# Patient Record
Sex: MALE | Race: WHITE | NOT HISPANIC OR LATINO | Employment: OTHER | URBAN - METROPOLITAN AREA
[De-identification: names, ages, dates, MRNs, and addresses within clinical notes are randomized per-mention and may not be internally consistent; named-entity substitution may affect disease eponyms.]

---

## 2018-01-13 ENCOUNTER — HOSPITAL ENCOUNTER (INPATIENT)
Facility: HOSPITAL | Age: 63
LOS: 12 days | Discharge: HOME HEALTH | DRG: 281 | End: 2018-01-26
Attending: EMERGENCY MEDICINE | Admitting: ANESTHESIOLOGY
Payer: COMMERCIAL

## 2018-01-13 DIAGNOSIS — I48.91 ATRIAL FIBRILLATION (HCC): ICD-10-CM

## 2018-01-13 DIAGNOSIS — I50.41 ACUTE COMBINED SYSTOLIC AND DIASTOLIC HEART FAILURE (HCC): ICD-10-CM

## 2018-01-13 DIAGNOSIS — K62.5 BRBPR (BRIGHT RED BLOOD PER RECTUM): ICD-10-CM

## 2018-01-13 DIAGNOSIS — I15.0 RENOVASCULAR HYPERTENSION: ICD-10-CM

## 2018-01-13 DIAGNOSIS — R73.9 HYPERGLYCEMIA: ICD-10-CM

## 2018-01-13 DIAGNOSIS — E66.01 MORBID OBESITY WITH BMI OF 40.0-44.9, ADULT (HCC): ICD-10-CM

## 2018-01-13 DIAGNOSIS — I48.91 RAPID ATRIAL FIBRILLATION (HCC): Primary | ICD-10-CM

## 2018-01-13 DIAGNOSIS — R06.00 DYSPNEA: ICD-10-CM

## 2018-01-13 DIAGNOSIS — I48.91 ATRIAL FIBRILLATION WITH RVR (HCC): ICD-10-CM

## 2018-01-13 DIAGNOSIS — K64.9 HEMORRHOIDS: ICD-10-CM

## 2018-01-13 LAB
BASOPHILS # BLD AUTO: 0.08 THOUSANDS/ΜL (ref 0–0.1)
BASOPHILS NFR BLD AUTO: 1 % (ref 0–1)
EOSINOPHIL # BLD AUTO: 0.04 THOUSAND/ΜL (ref 0–0.61)
EOSINOPHIL NFR BLD AUTO: 1 % (ref 0–6)
ERYTHROCYTE [DISTWIDTH] IN BLOOD BY AUTOMATED COUNT: 14.9 % (ref 11.6–15.1)
HCT VFR BLD AUTO: 43.8 % (ref 36.5–49.3)
HGB BLD-MCNC: 14.2 G/DL (ref 12–17)
LYMPHOCYTES # BLD AUTO: 1.03 THOUSANDS/ΜL (ref 0.6–4.47)
LYMPHOCYTES NFR BLD AUTO: 14 % (ref 14–44)
MCH RBC QN AUTO: 29.3 PG (ref 26.8–34.3)
MCHC RBC AUTO-ENTMCNC: 32.4 G/DL (ref 31.4–37.4)
MCV RBC AUTO: 90 FL (ref 82–98)
MONOCYTES # BLD AUTO: 0.65 THOUSAND/ΜL (ref 0.17–1.22)
MONOCYTES NFR BLD AUTO: 9 % (ref 4–12)
NEUTROPHILS # BLD AUTO: 5.6 THOUSANDS/ΜL (ref 1.85–7.62)
NEUTS SEG NFR BLD AUTO: 75 % (ref 43–75)
NRBC BLD AUTO-RTO: 0 /100 WBCS
PLATELET # BLD AUTO: 287 THOUSANDS/UL (ref 149–390)
PMV BLD AUTO: 10 FL (ref 8.9–12.7)
RBC # BLD AUTO: 4.85 MILLION/UL (ref 3.88–5.62)
WBC # BLD AUTO: 7.43 THOUSAND/UL (ref 4.31–10.16)

## 2018-01-13 PROCEDURE — 85025 COMPLETE CBC W/AUTO DIFF WBC: CPT | Performed by: EMERGENCY MEDICINE

## 2018-01-13 PROCEDURE — 84484 ASSAY OF TROPONIN QUANT: CPT | Performed by: EMERGENCY MEDICINE

## 2018-01-13 PROCEDURE — 93005 ELECTROCARDIOGRAM TRACING: CPT | Performed by: EMERGENCY MEDICINE

## 2018-01-13 PROCEDURE — 80053 COMPREHEN METABOLIC PANEL: CPT | Performed by: EMERGENCY MEDICINE

## 2018-01-13 PROCEDURE — 93005 ELECTROCARDIOGRAM TRACING: CPT

## 2018-01-13 PROCEDURE — 84443 ASSAY THYROID STIM HORMONE: CPT | Performed by: NURSE PRACTITIONER

## 2018-01-13 PROCEDURE — 36415 COLL VENOUS BLD VENIPUNCTURE: CPT | Performed by: EMERGENCY MEDICINE

## 2018-01-14 ENCOUNTER — APPOINTMENT (INPATIENT)
Dept: NON INVASIVE DIAGNOSTICS | Facility: HOSPITAL | Age: 63
DRG: 281 | End: 2018-01-14
Payer: COMMERCIAL

## 2018-01-14 ENCOUNTER — APPOINTMENT (EMERGENCY)
Dept: RADIOLOGY | Facility: HOSPITAL | Age: 63
DRG: 281 | End: 2018-01-14
Payer: COMMERCIAL

## 2018-01-14 ENCOUNTER — APPOINTMENT (INPATIENT)
Dept: NON INVASIVE DIAGNOSTICS | Facility: HOSPITAL | Age: 63
DRG: 281 | End: 2018-01-14
Attending: INTERNAL MEDICINE
Payer: COMMERCIAL

## 2018-01-14 PROBLEM — I10 HTN (HYPERTENSION): Status: ACTIVE | Noted: 2018-01-14

## 2018-01-14 PROBLEM — R73.9 HYPERGLYCEMIA: Status: ACTIVE | Noted: 2018-01-14

## 2018-01-14 PROBLEM — R06.02 SHORTNESS OF BREATH: Status: ACTIVE | Noted: 2018-01-14

## 2018-01-14 PROBLEM — I48.91 ATRIAL FIBRILLATION WITH RVR (HCC): Status: ACTIVE | Noted: 2018-01-14

## 2018-01-14 PROBLEM — E66.9 DIABETES MELLITUS TYPE 2 IN OBESE (HCC): Status: ACTIVE | Noted: 2018-01-14

## 2018-01-14 PROBLEM — E11.69 DIABETES MELLITUS TYPE 2 IN OBESE (HCC): Status: ACTIVE | Noted: 2018-01-14

## 2018-01-14 LAB
ACETONE SERPL-MCNC: NEGATIVE MG/DL
ALBUMIN SERPL BCP-MCNC: 4.2 G/DL (ref 3.5–5)
ALP SERPL-CCNC: 100 U/L (ref 46–116)
ALT SERPL W P-5'-P-CCNC: 106 U/L (ref 12–78)
ANION GAP SERPL CALCULATED.3IONS-SCNC: 14 MMOL/L (ref 4–13)
ANION GAP SERPL CALCULATED.3IONS-SCNC: 14 MMOL/L (ref 4–13)
APTT PPP: 24 SECONDS (ref 23–35)
APTT PPP: 25 SECONDS (ref 23–35)
ARTERIAL PATENCY WRIST A: ABNORMAL
AST SERPL W P-5'-P-CCNC: 46 U/L (ref 5–45)
ATRIAL RATE: 131 BPM
ATRIAL RATE: 147 BPM
BASE EX.OXY STD BLDV CALC-SCNC: 75.5 % (ref 60–80)
BASE EX.OXY STD BLDV CALC-SCNC: 95.6 % (ref 60–80)
BASE EXCESS BLDA CALC-SCNC: -6 MMOL/L (ref -2–3)
BASE EXCESS BLDV CALC-SCNC: -5.8 MMOL/L
BASE EXCESS BLDV CALC-SCNC: -7 MMOL/L
BILIRUB SERPL-MCNC: 1 MG/DL (ref 0.2–1)
BUN SERPL-MCNC: 18 MG/DL (ref 5–25)
BUN SERPL-MCNC: 23 MG/DL (ref 5–25)
CA-I BLD-SCNC: 1.24 MMOL/L (ref 1.12–1.32)
CALCIUM SERPL-MCNC: 9.3 MG/DL (ref 8.3–10.1)
CALCIUM SERPL-MCNC: 9.3 MG/DL (ref 8.3–10.1)
CHLORIDE SERPL-SCNC: 101 MMOL/L (ref 100–108)
CHLORIDE SERPL-SCNC: 107 MMOL/L (ref 100–108)
CO2 SERPL-SCNC: 22 MMOL/L (ref 21–32)
CO2 SERPL-SCNC: 23 MMOL/L (ref 21–32)
CREAT SERPL-MCNC: 1.04 MG/DL (ref 0.6–1.3)
CREAT SERPL-MCNC: 1.35 MG/DL (ref 0.6–1.3)
ERYTHROCYTE [DISTWIDTH] IN BLOOD BY AUTOMATED COUNT: 15.2 % (ref 11.6–15.1)
EST. AVERAGE GLUCOSE BLD GHB EST-MCNC: 223 MG/DL
FLUAV AG SPEC QL: NORMAL
FLUBV AG SPEC QL: NORMAL
GFR SERPL CREATININE-BSD FRML MDRD: 55 ML/MIN/1.73SQ M
GFR SERPL CREATININE-BSD FRML MDRD: 76 ML/MIN/1.73SQ M
GLUCOSE SERPL-MCNC: 131 MG/DL (ref 65–140)
GLUCOSE SERPL-MCNC: 181 MG/DL (ref 65–140)
GLUCOSE SERPL-MCNC: 216 MG/DL (ref 65–140)
GLUCOSE SERPL-MCNC: 221 MG/DL (ref 65–140)
GLUCOSE SERPL-MCNC: 255 MG/DL (ref 65–140)
GLUCOSE SERPL-MCNC: 261 MG/DL (ref 65–140)
GLUCOSE SERPL-MCNC: 268 MG/DL (ref 65–140)
GLUCOSE SERPL-MCNC: 295 MG/DL (ref 65–140)
GLUCOSE SERPL-MCNC: 424 MG/DL (ref 65–140)
GLUCOSE SERPL-MCNC: 437 MG/DL (ref 65–140)
GLUCOSE SERPL-MCNC: 484 MG/DL (ref 65–140)
GLUCOSE SERPL-MCNC: 93 MG/DL (ref 65–140)
HBA1C MFR BLD: 9.4 % (ref 4.2–6.3)
HCO3 BLDA-SCNC: 16 MMOL/L (ref 24–30)
HCO3 BLDV-SCNC: 15.4 MMOL/L (ref 24–30)
HCO3 BLDV-SCNC: 18.6 MMOL/L (ref 24–30)
HCT VFR BLD AUTO: 38.1 % (ref 36.5–49.3)
HCT VFR BLD CALC: 36 % (ref 36.5–49.3)
HGB BLD-MCNC: 12.4 G/DL (ref 12–17)
HGB BLDA-MCNC: 12.2 G/DL (ref 12–17)
INR PPP: 1.13 (ref 0.86–1.16)
LACTATE SERPL-SCNC: 2.3 MMOL/L (ref 0.5–2)
LACTATE SERPL-SCNC: 2.4 MMOL/L (ref 0.5–2)
MCH RBC QN AUTO: 29.4 PG (ref 26.8–34.3)
MCHC RBC AUTO-ENTMCNC: 32.5 G/DL (ref 31.4–37.4)
MCV RBC AUTO: 90 FL (ref 82–98)
NASAL CANNULA: 2
NT-PROBNP SERPL-MCNC: 4171 PG/ML
O2 CT BLDV-SCNC: 14.3 ML/DL
O2 CT BLDV-SCNC: 18.9 ML/DL
PCO2 BLD: 17 MMOL/L (ref 21–32)
PCO2 BLD: 22.5 MM HG (ref 42–50)
PCO2 BLDV: 23.8 MM HG (ref 42–50)
PCO2 BLDV: 33.3 MM HG (ref 42–50)
PH BLD: 7.46 [PH] (ref 7.3–7.4)
PH BLDV: 7.37 [PH] (ref 7.3–7.4)
PH BLDV: 7.43 [PH] (ref 7.3–7.4)
PLATELET # BLD AUTO: 253 THOUSANDS/UL (ref 149–390)
PLATELET # BLD AUTO: 255 THOUSANDS/UL (ref 149–390)
PMV BLD AUTO: 10.3 FL (ref 8.9–12.7)
PMV BLD AUTO: 9.9 FL (ref 8.9–12.7)
PO2 BLD: 118 MM HG (ref 35–45)
PO2 BLDV: 43.3 MM HG (ref 35–45)
PO2 BLDV: 99.5 MM HG (ref 35–45)
POTASSIUM BLD-SCNC: 4.3 MMOL/L (ref 3.5–5.3)
POTASSIUM SERPL-SCNC: 4 MMOL/L (ref 3.5–5.3)
POTASSIUM SERPL-SCNC: 4.1 MMOL/L (ref 3.5–5.3)
PROT SERPL-MCNC: 7.8 G/DL (ref 6.4–8.2)
PROTHROMBIN TIME: 14.8 SECONDS (ref 12.1–14.4)
QRS AXIS: -45 DEGREES
QRS AXIS: -46 DEGREES
QRSD INTERVAL: 110 MS
QRSD INTERVAL: 120 MS
QT INTERVAL: 280 MS
QT INTERVAL: 320 MS
QTC INTERVAL: 390 MS
QTC INTERVAL: 505 MS
RBC # BLD AUTO: 4.22 MILLION/UL (ref 3.88–5.62)
RSV B RNA SPEC QL NAA+PROBE: NORMAL
SAMPLE SITE: ABNORMAL
SAO2 % BLD FROM PO2: 99 % (ref 95–98)
SODIUM BLD-SCNC: 137 MMOL/L (ref 136–145)
SODIUM SERPL-SCNC: 138 MMOL/L (ref 136–145)
SODIUM SERPL-SCNC: 143 MMOL/L (ref 136–145)
SPECIMEN SOURCE: ABNORMAL
T WAVE AXIS: 56 DEGREES
T WAVE AXIS: 68 DEGREES
TROPONIN I SERPL-MCNC: 0.04 NG/ML
TROPONIN I SERPL-MCNC: 0.04 NG/ML
TROPONIN I SERPL-MCNC: 0.05 NG/ML
TROPONIN I SERPL-MCNC: 0.06 NG/ML
TSH SERPL DL<=0.05 MIU/L-ACNC: 3.66 UIU/ML (ref 0.36–3.74)
VENTRICULAR RATE: 117 BPM
VENTRICULAR RATE: 150 BPM
WBC # BLD AUTO: 8.58 THOUSAND/UL (ref 4.31–10.16)

## 2018-01-14 PROCEDURE — 99285 EMERGENCY DEPT VISIT HI MDM: CPT

## 2018-01-14 PROCEDURE — 96361 HYDRATE IV INFUSION ADD-ON: CPT

## 2018-01-14 PROCEDURE — 85014 HEMATOCRIT: CPT

## 2018-01-14 PROCEDURE — 94660 CPAP INITIATION&MGMT: CPT

## 2018-01-14 PROCEDURE — 36415 COLL VENOUS BLD VENIPUNCTURE: CPT | Performed by: NURSE PRACTITIONER

## 2018-01-14 PROCEDURE — 93306 TTE W/DOPPLER COMPLETE: CPT

## 2018-01-14 PROCEDURE — 80048 BASIC METABOLIC PNL TOTAL CA: CPT | Performed by: NURSE PRACTITIONER

## 2018-01-14 PROCEDURE — 85049 AUTOMATED PLATELET COUNT: CPT | Performed by: NURSE PRACTITIONER

## 2018-01-14 PROCEDURE — 84484 ASSAY OF TROPONIN QUANT: CPT | Performed by: NURSE PRACTITIONER

## 2018-01-14 PROCEDURE — 85730 THROMBOPLASTIN TIME PARTIAL: CPT | Performed by: NURSE PRACTITIONER

## 2018-01-14 PROCEDURE — 85027 COMPLETE CBC AUTOMATED: CPT | Performed by: NURSE PRACTITIONER

## 2018-01-14 PROCEDURE — 82947 ASSAY GLUCOSE BLOOD QUANT: CPT

## 2018-01-14 PROCEDURE — 93005 ELECTROCARDIOGRAM TRACING: CPT

## 2018-01-14 PROCEDURE — 71046 X-RAY EXAM CHEST 2 VIEWS: CPT

## 2018-01-14 PROCEDURE — 36600 WITHDRAWAL OF ARTERIAL BLOOD: CPT

## 2018-01-14 PROCEDURE — 82948 REAGENT STRIP/BLOOD GLUCOSE: CPT

## 2018-01-14 PROCEDURE — 96374 THER/PROPH/DIAG INJ IV PUSH: CPT

## 2018-01-14 PROCEDURE — 83605 ASSAY OF LACTIC ACID: CPT | Performed by: NURSE PRACTITIONER

## 2018-01-14 PROCEDURE — 83880 ASSAY OF NATRIURETIC PEPTIDE: CPT | Performed by: NURSE PRACTITIONER

## 2018-01-14 PROCEDURE — 82009 KETONE BODYS QUAL: CPT | Performed by: NURSE PRACTITIONER

## 2018-01-14 PROCEDURE — 83036 HEMOGLOBIN GLYCOSYLATED A1C: CPT | Performed by: NURSE PRACTITIONER

## 2018-01-14 PROCEDURE — 85610 PROTHROMBIN TIME: CPT | Performed by: NURSE PRACTITIONER

## 2018-01-14 PROCEDURE — 94640 AIRWAY INHALATION TREATMENT: CPT

## 2018-01-14 PROCEDURE — 84132 ASSAY OF SERUM POTASSIUM: CPT

## 2018-01-14 PROCEDURE — 82805 BLOOD GASES W/O2 SATURATION: CPT | Performed by: NURSE PRACTITIONER

## 2018-01-14 PROCEDURE — 87798 DETECT AGENT NOS DNA AMP: CPT | Performed by: NURSE PRACTITIONER

## 2018-01-14 PROCEDURE — 84295 ASSAY OF SERUM SODIUM: CPT

## 2018-01-14 PROCEDURE — 82330 ASSAY OF CALCIUM: CPT

## 2018-01-14 PROCEDURE — 94760 N-INVAS EAR/PLS OXIMETRY 1: CPT

## 2018-01-14 PROCEDURE — 82803 BLOOD GASES ANY COMBINATION: CPT

## 2018-01-14 RX ORDER — ACETAMINOPHEN 325 MG/1
650 TABLET ORAL EVERY 6 HOURS PRN
Status: DISCONTINUED | OUTPATIENT
Start: 2018-01-14 | End: 2018-01-26 | Stop reason: HOSPADM

## 2018-01-14 RX ORDER — HEPARIN SODIUM 10000 [USP'U]/100ML
3-20 INJECTION, SOLUTION INTRAVENOUS
Status: DISCONTINUED | OUTPATIENT
Start: 2018-01-14 | End: 2018-01-18

## 2018-01-14 RX ORDER — SIMVASTATIN 40 MG
40 TABLET ORAL
COMMUNITY

## 2018-01-14 RX ORDER — SODIUM CHLORIDE 9 MG/ML
75 INJECTION, SOLUTION INTRAVENOUS CONTINUOUS
Status: DISCONTINUED | OUTPATIENT
Start: 2018-01-14 | End: 2018-01-14

## 2018-01-14 RX ORDER — METOPROLOL TARTRATE 5 MG/5ML
5 INJECTION INTRAVENOUS ONCE
Status: COMPLETED | OUTPATIENT
Start: 2018-01-14 | End: 2018-01-14

## 2018-01-14 RX ORDER — LEVALBUTEROL 1.25 MG/.5ML
1.25 SOLUTION, CONCENTRATE RESPIRATORY (INHALATION) EVERY 6 HOURS PRN
Status: DISCONTINUED | OUTPATIENT
Start: 2018-01-14 | End: 2018-01-20

## 2018-01-14 RX ORDER — FUROSEMIDE 10 MG/ML
40 INJECTION INTRAMUSCULAR; INTRAVENOUS ONCE
Status: COMPLETED | OUTPATIENT
Start: 2018-01-14 | End: 2018-01-14

## 2018-01-14 RX ORDER — HEPARIN SODIUM 1000 [USP'U]/ML
4000 INJECTION, SOLUTION INTRAVENOUS; SUBCUTANEOUS ONCE
Status: COMPLETED | OUTPATIENT
Start: 2018-01-14 | End: 2018-01-14

## 2018-01-14 RX ORDER — HEPARIN SODIUM 5000 [USP'U]/ML
7500 INJECTION, SOLUTION INTRAVENOUS; SUBCUTANEOUS EVERY 8 HOURS SCHEDULED
Status: DISCONTINUED | OUTPATIENT
Start: 2018-01-14 | End: 2018-01-14

## 2018-01-14 RX ORDER — DILTIAZEM HYDROCHLORIDE 5 MG/ML
15 INJECTION INTRAVENOUS ONCE
Status: COMPLETED | OUTPATIENT
Start: 2018-01-14 | End: 2018-01-14

## 2018-01-14 RX ORDER — SODIUM CHLORIDE FOR INHALATION 0.9 %
3 VIAL, NEBULIZER (ML) INHALATION EVERY 6 HOURS PRN
Status: DISCONTINUED | OUTPATIENT
Start: 2018-01-14 | End: 2018-01-20

## 2018-01-14 RX ORDER — CHLORHEXIDINE GLUCONATE 0.12 MG/ML
15 RINSE ORAL EVERY 12 HOURS SCHEDULED
Status: DISCONTINUED | OUTPATIENT
Start: 2018-01-14 | End: 2018-01-26 | Stop reason: HOSPADM

## 2018-01-14 RX ORDER — INSULIN GLARGINE 100 [IU]/ML
66 INJECTION, SOLUTION SUBCUTANEOUS
Status: ON HOLD | COMMUNITY
End: 2018-01-26

## 2018-01-14 RX ORDER — LEVALBUTEROL 1.25 MG/.5ML
1.25 SOLUTION, CONCENTRATE RESPIRATORY (INHALATION)
Status: DISCONTINUED | OUTPATIENT
Start: 2018-01-14 | End: 2018-01-14

## 2018-01-14 RX ORDER — METOPROLOL TARTRATE 5 MG/5ML
5 INJECTION INTRAVENOUS EVERY 6 HOURS
Status: DISCONTINUED | OUTPATIENT
Start: 2018-01-14 | End: 2018-01-14

## 2018-01-14 RX ORDER — METFORMIN HYDROCHLORIDE 1000 MG/1
1000 TABLET, FILM COATED, EXTENDED RELEASE ORAL
COMMUNITY

## 2018-01-14 RX ORDER — AMLODIPINE BESYLATE 5 MG/1
5 TABLET ORAL DAILY
COMMUNITY
End: 2018-01-26 | Stop reason: HOSPADM

## 2018-01-14 RX ORDER — HEPARIN SODIUM 1000 [USP'U]/ML
4000 INJECTION, SOLUTION INTRAVENOUS; SUBCUTANEOUS AS NEEDED
Status: DISCONTINUED | OUTPATIENT
Start: 2018-01-14 | End: 2018-01-18

## 2018-01-14 RX ORDER — HEPARIN SODIUM 1000 [USP'U]/ML
2000 INJECTION, SOLUTION INTRAVENOUS; SUBCUTANEOUS AS NEEDED
Status: DISCONTINUED | OUTPATIENT
Start: 2018-01-14 | End: 2018-01-18

## 2018-01-14 RX ORDER — CARVEDILOL 6.25 MG/1
6.25 TABLET ORAL 2 TIMES DAILY WITH MEALS
Status: DISCONTINUED | OUTPATIENT
Start: 2018-01-14 | End: 2018-01-15

## 2018-01-14 RX ORDER — DILTIAZEM HYDROCHLORIDE 5 MG/ML
INJECTION INTRAVENOUS
Status: DISPENSED
Start: 2018-01-14 | End: 2018-01-14

## 2018-01-14 RX ORDER — LOSARTAN POTASSIUM 100 MG/1
100 TABLET ORAL DAILY
COMMUNITY
End: 2018-01-26 | Stop reason: HOSPADM

## 2018-01-14 RX ADMIN — SODIUM CHLORIDE 1000 ML: 0.9 INJECTION, SOLUTION INTRAVENOUS at 01:15

## 2018-01-14 RX ADMIN — CARVEDILOL 6.25 MG: 6.25 TABLET, FILM COATED ORAL at 17:26

## 2018-01-14 RX ADMIN — HEPARIN SODIUM 4000 UNITS: 1000 INJECTION, SOLUTION INTRAVENOUS; SUBCUTANEOUS at 12:35

## 2018-01-14 RX ADMIN — LEVALBUTEROL HYDROCHLORIDE 1.25 MG: 1.25 SOLUTION, CONCENTRATE RESPIRATORY (INHALATION) at 10:45

## 2018-01-14 RX ADMIN — DILTIAZEM HYDROCHLORIDE 15 MG: 5 INJECTION INTRAVENOUS at 00:09

## 2018-01-14 RX ADMIN — ISODIUM CHLORIDE 3 ML: 0.03 SOLUTION RESPIRATORY (INHALATION) at 20:13

## 2018-01-14 RX ADMIN — HEPARIN SODIUM 7500 UNITS: 5000 INJECTION, SOLUTION INTRAVENOUS; SUBCUTANEOUS at 06:04

## 2018-01-14 RX ADMIN — Medication 10 UNITS/HR: at 03:59

## 2018-01-14 RX ADMIN — AMIODARONE HYDROCHLORIDE 150 MG: 50 INJECTION, SOLUTION INTRAVENOUS at 12:06

## 2018-01-14 RX ADMIN — ISODIUM CHLORIDE 3 ML: 0.03 SOLUTION RESPIRATORY (INHALATION) at 10:45

## 2018-01-14 RX ADMIN — FUROSEMIDE 40 MG: 10 INJECTION, SOLUTION INTRAMUSCULAR; INTRAVENOUS at 12:34

## 2018-01-14 RX ADMIN — Medication 8 UNITS/HR: at 22:29

## 2018-01-14 RX ADMIN — AMIODARONE HYDROCHLORIDE 1 MG/MIN: 50 INJECTION, SOLUTION INTRAVENOUS at 12:22

## 2018-01-14 RX ADMIN — AMIODARONE HYDROCHLORIDE 0.5 MG/MIN: 50 INJECTION, SOLUTION INTRAVENOUS at 18:30

## 2018-01-14 RX ADMIN — HEPARIN SODIUM 4000 UNITS: 1000 INJECTION, SOLUTION INTRAVENOUS; SUBCUTANEOUS at 21:09

## 2018-01-14 RX ADMIN — METOPROLOL TARTRATE 5 MG: 5 INJECTION, SOLUTION INTRAVENOUS at 01:37

## 2018-01-14 RX ADMIN — SODIUM CHLORIDE 1000 ML: 0.9 INJECTION, SOLUTION INTRAVENOUS at 00:15

## 2018-01-14 RX ADMIN — LEVALBUTEROL HYDROCHLORIDE 1.25 MG: 1.25 SOLUTION, CONCENTRATE RESPIRATORY (INHALATION) at 20:13

## 2018-01-14 RX ADMIN — HEPARIN SODIUM AND DEXTROSE 11.1 UNITS/KG/HR: 10000; 5 INJECTION INTRAVENOUS at 12:35

## 2018-01-14 RX ADMIN — METOPROLOL TARTRATE 5 MG: 5 INJECTION, SOLUTION INTRAVENOUS at 06:04

## 2018-01-14 RX ADMIN — METOPROLOL TARTRATE 5 MG: 5 INJECTION, SOLUTION INTRAVENOUS at 03:22

## 2018-01-14 RX ADMIN — SODIUM CHLORIDE 75 ML/HR: 0.9 INJECTION, SOLUTION INTRAVENOUS at 03:23

## 2018-01-14 RX ADMIN — CHLORHEXIDINE GLUCONATE 15 ML: 1.2 RINSE ORAL at 10:03

## 2018-01-14 RX ADMIN — ACETAMINOPHEN 650 MG: 325 TABLET ORAL at 17:26

## 2018-01-14 NOTE — ED PROVIDER NOTES
History  Chief Complaint   Patient presents with    Shortness of Breath     Pt c/o SOB since [address] without chest pain  Pt has dypnea on excertion and rest  Pt recently started Jaurdiance  Denies N/V     HPI     68-year-old man presents with cough and shortness of breath  Not perceiving any palpitations  Found to be tachycardic on arrival here  Does not live with lung disease  Does not note only have any heart disease  Has diabetes that is generally poorly controlled  Also has hypertension hyperlipidemia  No lightheadedness weakness dizziness fevers chills or sweats  No abdominal pain nausea vomiting diarrhea constipation  No injuries  Medical decision making:    Impression:  Rapid AFib leading to affective congestive heart failure  While he is not fluid overloaded, he warrants fluid resuscitation for his hyperglycemia and tachycardia  He may warrant primary heart rate control  Will admit patient to the step-down unit  Prior to Admission Medications   Prescriptions Last Dose Informant Patient Reported? Taking? amLODIPine (NORVASC) 5 mg tablet   Yes Yes   Sig: Take 5 mg by mouth daily   insulin glargine (LANTUS) 100 units/mL subcutaneous injection   Yes Yes   Sig: Inject 66 Units under the skin daily at bedtime   losartan (COZAAR) 100 MG tablet   Yes Yes   Sig: Take 100 mg by mouth daily   metFORMIN (GLUMETZA) 1000 MG (MOD) 24 hr tablet   Yes Yes   Sig: Take 1,000 mg by mouth daily with breakfast   simvastatin (ZOCOR) 40 mg tablet   Yes Yes   Sig: Take 40 mg by mouth daily at bedtime      Facility-Administered Medications: None       Past Medical History:   Diagnosis Date    Diabetes mellitus ([facility] [address] )     Hyperlipidemia     Hypertension        Past Surgical History:   Procedure Laterality Date    CHOLECYSTECTOMY      KNEE SURGERY Left     NECK SURGERY         History reviewed  No pertinent family history  I have reviewed and agree with the history as documented      Social History Substance Use Topics    Smoking status: Never Smoker    Smokeless tobacco: Never Used    Alcohol use 1 8 oz/week     3 Cans of beer per week        Review of Systems   Constitutional: Positive for fatigue  Negative for appetite change, diaphoresis and fever  HENT: Negative for congestion, dental problem, drooling, ear discharge, ear pain, postnasal drip, rhinorrhea, sore throat, trouble swallowing and voice change  Eyes: Negative for photophobia, pain, discharge, redness and visual disturbance  Respiratory: Positive for chest tightness and shortness of breath  Negative for cough, choking, wheezing and stridor  Cardiovascular: Negative for chest pain, palpitations and leg swelling  Gastrointestinal: Negative for abdominal pain, blood in stool, constipation, diarrhea, nausea and vomiting  Endocrine: Negative  Genitourinary: Negative  Musculoskeletal: Negative  Skin: Negative  Allergic/Immunologic: Negative  Neurological: Negative  Hematological: Negative  Psychiatric/Behavioral: Negative  Physical Exam  ED Triage Vitals   Temperature Pulse Respirations Blood Pressure SpO2   01/13/18 2351 01/13/18 2347 01/13/18 2347 01/13/18 2347 01/13/18 2347   97 9 °F (36 6 °C) (!) 162 (!) 28 121/91 95 %      Temp Source Heart Rate Source Patient Position - Orthostatic VS BP Location FiO2 (%)   01/13/18 2351 01/13/18 2347 01/13/18 2347 01/13/18 2347 --   Oral Monitor Lying Right arm       Pain Score       01/13/18 2347       No Pain           Orthostatic Vital Signs  Vitals:    01/14/18 0045 01/14/18 0145 01/14/18 0245 01/14/18 0437   BP: (!) 121/101 110/71 145/95    Pulse: (!) 134 (!) 117 (!) 121 (!) 109   Patient Position - Orthostatic VS: Lying Lying Sitting        Physical Exam   Constitutional: He is oriented to person, place, and time  He appears well-developed and well-nourished  No distress  Chronically ill-appearing male   HENT:   Head: Normocephalic and atraumatic     Nose: Nose normal    Mouth/Throat: Oropharynx is clear and moist    Eyes: Conjunctivae and EOM are normal  Pupils are equal, round, and reactive to light  Neck: Normal range of motion  Neck supple  No thyromegaly present  Cardiovascular: Normal heart sounds and intact distal pulses  Exam reveals no gallop and no friction rub  No murmur heard  Rapid atrial fibrillation   Pulmonary/Chest: Effort normal and breath sounds normal  No stridor  No respiratory distress  He has no wheezes  He has no rales  He exhibits no tenderness  Mild tachypnea   Abdominal: Soft  Bowel sounds are normal  There is no tenderness  There is no guarding  Musculoskeletal: Normal range of motion  He exhibits no deformity  Neurological: He is alert and oriented to person, place, and time  He exhibits normal muscle tone  Skin: Skin is warm and dry  Psychiatric: He has a normal mood and affect  Vitals reviewed        ED Medications  Medications   sodium chloride 0 9 % infusion (75 mL/hr Intravenous New Bag 1/14/18 0323)   chlorhexidine (PERIDEX) 0 12 % oral rinse 15 mL (15 mL Swish & Spit Not Given 1/14/18 0406)   heparin (porcine) subcutaneous injection 7,500 Units (7,500 Units Subcutaneous Given 1/14/18 0604)   insulin regular (HumuLIN R,NovoLIN R) 1 Units/mL in sodium chloride 0 9 % 100 mL infusion (10 Units/hr Intravenous New Bag 1/14/18 0359)   metoprolol (LOPRESSOR) injection 5 mg (5 mg Intravenous Given 1/14/18 0604)   levalbuterol (XOPENEX) inhalation solution 1 25 mg (not administered)   sodium chloride 0 9 % inhalation solution 3 mL (not administered)   sodium chloride 0 9 % bolus 1,000 mL (0 mL Intravenous Stopped 1/14/18 0045)   diltiazem (CARDIZEM) injection 15 mg (15 mg Intravenous Not Given 1/14/18 0630)   sodium chloride 0 9 % bolus 1,000 mL (0 mL Intravenous Stopped 1/14/18 0145)   metoprolol (LOPRESSOR) injection 5 mg (5 mg Intravenous Given 1/14/18 0137)   metoprolol (LOPRESSOR) injection 5 mg (5 mg Intravenous Given 1/14/18 0322)       Diagnostic Studies  Results Reviewed     Procedure Component Value Units Date/Time    Platelet count [[id number]]  (Normal) Collected:  01/14/18 0533    Lab Status:  Final result Specimen:  Blood from Arm, Left Updated:  01/14/18 0546     Platelets 371 Thousands/uL      MPV 10 3 fL     Lactic acid, plasma [[id number]]  (Abnormal) Collected:  01/14/18 0246    Lab Status:  Final result Specimen:  Blood from Arm, Right Updated:  01/14/18 0317     LACTIC ACID 2 4 (HH) mmol/L     Narrative:         Result may be elevated if tourniquet was used during collection  Acetone [[id number]]  (Normal) Collected:  01/14/18 0246    Lab Status:  Final result Specimen:  Blood from Arm, Right Updated:  01/14/18 0303     Acetone, Bld Negative    Blood gas, venous [[id number]]  (Abnormal) Collected:  01/14/18 0246    Lab Status:  Final result Specimen:  Blood from Arm, Right Updated:  01/14/18 0256     pH, Thomas 7 428 (H)     pCO2, Thomas 23 8 (L) mm Hg      pO2, Thomas 99 5 (H) mm Hg      HCO3, Thomas 15 4 (L) mmol/L      Base Excess, Thomas -7 0 mmol/L      O2 Content, Thomas 18 9 ml/dL      O2 HGB, VENOUS 95 6 (H) %     Hemoglobin A1c [[id number]] Collected:  01/14/18 0246    Lab Status: In process Specimen:  Blood from Arm, Right Updated:  01/14/18 0253    TSH, 3rd generation with T4 reflex [[id number]]  (Normal) Collected:  01/13/18 7008    Lab Status:  Final result Specimen:  Blood from Arm, Right Updated:  01/14/18 0237     TSH 3RD GENERATON 3 661 uIU/mL     Narrative:         Patients undergoing fluorescein dye angiography may retain small amounts of fluorescein in the body for 48-72 hours post procedure  Samples containing fluorescein can produce falsely depressed TSH values  If the patient had this procedure,a specimen should be resubmitted post fluorescein clearance      UA w Reflex to Microscopic w Reflex to Culture [[id number]]     Lab Status:  No result Specimen:  Urine from Urine, Clean Catch     Troponin I [[id number]] (Normal) Collected:  01/13/18 2349    Lab Status:  Final result Specimen:  Blood from Arm, Right Updated:  01/14/18 0013     Troponin I 0 04 ng/mL     Narrative:         [employer] Chemistry analyzer 99% cutoff is > 0 04 ng/mL in network labs    o cTnI 99% cutoff is useful only when applied to patients in the clinical setting of myocardial ischemia  o cTnI 99% cutoff should be interpreted in the context of clinical history, ECG findings and possibly cardiac imaging to establish correct diagnosis  o cTnI 99% cutoff may be suggestive but clearly not indicative of a coronary event without the clinical setting of myocardial ischemia  Comprehensive metabolic panel [[id number]]  (Abnormal) Collected:  01/13/18 2349    Lab Status:  Final result Specimen:  Blood from Arm, Right Updated:  01/14/18 0010     Sodium 138 mmol/L      Potassium 4 0 mmol/L      Chloride 101 mmol/L      CO2 23 mmol/L      Anion Gap 14 (H) mmol/L      BUN 18 mg/dL      Creatinine 1 35 (H) mg/dL      Glucose 424 (H) mg/dL      Calcium 9 3 mg/dL      AST 46 (H) U/L       (H) U/L      Alkaline Phosphatase 100 U/L      Total Protein 7 8 g/dL      Albumin 4 2 g/dL      Total Bilirubin 1 00 mg/dL      eGFR 55 ml/min/1 73sq m     Narrative:         National Kidney Disease Education Program recommendations are as follows:  GFR calculation is accurate only with a steady state creatinine  Chronic Kidney disease less than 60 ml/min/1 73 sq  meters  Kidney failure less than 15 ml/min/1 73 sq  meters      CBC and differential [[id number]]  (Normal) Collected:  01/13/18 2349    Lab Status:  Final result Specimen:  Blood from Arm, Right Updated:  01/13/18 2354     WBC 7 43 Thousand/uL      RBC 4 85 Million/uL      Hemoglobin 14 2 g/dL      Hematocrit 43 8 %      MCV 90 fL      MCH 29 3 pg      MCHC 32 4 g/dL      RDW 14 9 %      MPV 10 0 fL      Platelets 167 Thousands/uL      nRBC 0 /100 WBCs      Neutrophils Relative 75 %      Lymphocytes Relative 14 % Monocytes Relative 9 %      Eosinophils Relative 1 %      Basophils Relative 1 %      Neutrophils Absolute 5 60 Thousands/µL      Lymphocytes Absolute 1 03 Thousands/µL      Monocytes Absolute 0 65 Thousand/µL      Eosinophils Absolute 0 04 Thousand/µL      Basophils Absolute 0 08 Thousands/µL                  XR chest pa & lateral    (Results Pending)              Procedures  ECG 12 Lead Documentation  Date/Time: 1/14/2018 12:44 AM  Performed by: KAY Giang  Authorized by: KAY Giang     Interpretation:     Interpretation: abnormal    Rate:     ECG rate assessment: tachycardic    Rhythm:     Rhythm: atrial fibrillation    Ectopy:     Ectopy: none    QRS:     QRS axis:  Normal    QRS intervals:  Normal  Conduction:     Conduction: normal    ST segments:     ST segments:  Normal  T waves:     T waves: normal    CriticalCare Time  Performed byKAY Rudd  Authorized byKAY Rudd     Critical care provider statement:     Critical care was necessary to treat or prevent imminent or life-threatening deterioration of the following conditions:  Cardiac failure    Critical care was time spent personally by me on the following activities:  Blood draw for specimens, obtaining history from patient or surrogate, development of treatment plan with patient or surrogate, discussions with consultants, evaluation of patient's response to treatment, examination of patient, review of old charts, re-evaluation of patient's condition, ordering and review of radiographic studies, ordering and review of laboratory studies and ordering and performing treatments and interventions  Comments:      Cardizem drip for rapid AFib with acute CHF  Multiple conversations with transfer of patient care  Phone Contacts  ED Phone Contact    ED Course  ED Course                                MDM  Number of Diagnoses or Management Options  Dyspnea:   Hyperglycemia:   Rapid atrial fibrillation ([address] ):      The patient presented with a condition in which there was a high probability of imminent or life-threatening deterioration, and critical care services (excluding separately billable procedures) totalled 30-74 minutes  Disposition  Final diagnoses:   Rapid atrial fibrillation ([address] )   Dyspnea   Hyperglycemia     Time reflects when diagnosis was documented in both MDM as applicable and the Disposition within this note     Time User Action Codes Description Comment    1/14/2018  1:32 AM Mendy, Case Add [[id number]] Rapid atrial fibrillation ([address] )     1/14/2018  1:32 AM Mendy, Case Add [[id number]] Dyspnea     1/14/2018  1:32 AM Mendy, Case Add [[id number]] Hyperglycemia       ED Disposition     ED Disposition Condition Comment    Admit  Case was discussed with critical care and the patient's admission status was agreed to be Admission Status: inpatient status to the service of Dr Sam Hogan  Follow-up Information    None       Current Discharge Medication List      CONTINUE these medications which have NOT CHANGED    Details   amLODIPine (NORVASC) 5 mg tablet Take 5 mg by mouth daily      insulin glargine (LANTUS) 100 units/mL subcutaneous injection Inject 66 Units under the skin daily at bedtime      losartan (COZAAR) 100 MG tablet Take 100 mg by mouth daily      metFORMIN (GLUMETZA) 1000 MG (MOD) 24 hr tablet Take 1,000 mg by mouth daily with breakfast      simvastatin (ZOCOR) 40 mg tablet Take 40 mg by mouth daily at bedtime           No discharge procedures on file      ED Provider  Electronically Signed by           [facility] Charla  01/14/18 0985

## 2018-01-14 NOTE — CONSULTS
Consultation - Cardiology Team Damaso  [facility] 60 y o  male MRN: [id number]  Unit/Bed#:  Encounter: [id number]    Inpatient consult to Cardiology  Consult performed by: Mya Parkinson  Consult ordered by: Jia Kelly          Physician Requesting Consult: Wendy Dockery DO  Reason for Consult / Principal Problem: afib    HPI: Cardiologist Dr Indira Bolden is a 60y o  year old male who has a history of diabetes, hypertension  Patient cam to the emergency room with complaints of nocturnal dyspnea and a 2 day history of rapid palpitations  He states he was recently started on Jardiance by his cardiologist who also acts as his primary care physician  He denies having any heart problems  He states he started having chest congestion 2 days after starting Jardiance  He also notes he ran out of his insulin medication    Currently he states he is feeling okay states his breathing has improved, denies any feeling of palpitations    REVIEW OF SYSTEMS:  Constitutional:  Denies fever or chills   Eyes:  Denies change in visual acuity   HENT:  Denies nasal congestion or sore throat   Respiratory: + shortness of breath Denies cough  Cardiovascular: + palpitations Denies chest pain or edema   GI:  Denies abdominal pain, nausea, vomiting, bloody stools or diarrhea   :  Denies dysuria, frequency, difficulty in micturition and nocturia  Musculoskeletal:  Denies back pain or joint pain   Neurologic:  Denies headache, focal weakness or sensory changes   Endocrine:  Denies polyuria or polydipsia   Lymphatic:  Denies swollen glands   Psychiatric:  Denies depression or anxiety     Historical Information   Past Medical History:   Diagnosis Date    Diabetes mellitus ([facility] [address] )     Hyperlipidemia     Hypertension      Past Surgical History:   Procedure Laterality Date    CHOLECYSTECTOMY      KNEE SURGERY Left     NECK SURGERY       History   Alcohol Use    1 8 oz/week    3 Cans of beer per week     History   Drug Use No     History   Smoking Status    Never Smoker   Smokeless Tobacco    Never Used       Family History: History reviewed  No pertinent family history  MEDS & ALLERGIES:  all current active meds have been reviewed and current meds: Current Facility-Administered Medications   Medication Dose Route Frequency    amiodarone (CORDARONE) 900 mg in dextrose 5 % 500 mL infusion  1 mg/min Intravenous Continuous    Followed by   [place] Cousin amiodarone (CORDARONE) 900 mg in dextrose 5 % 500 mL infusion  0 5 mg/min Intravenous Continuous    amiodarone 150 mg in dextrose 5 % 100 mL IV bolus  150 mg Intravenous Once    chlorhexidine (PERIDEX) 0 12 % oral rinse 15 mL  15 mL Swish & Spit Q12H [address]    furosemide (LASIX) injection 40 mg  40 mg Intravenous Once    heparin (porcine) 25,000 units in 250 mL infusion (premix)  3-20 Units/kg/hr (Order-Specific) Intravenous Titrated    heparin (porcine) injection 2,000 Units  2,000 Units Intravenous PRN    heparin (porcine) injection 4,000 Units  4,000 Units Intravenous Once    heparin (porcine) injection 4,000 Units  4,000 Units Intravenous PRN    insulin regular (HumuLIN R,NovoLIN R) 1 Units/mL in sodium chloride 0 9 % 100 mL infusion  0 3-21 Units/hr Intravenous Titrated    levalbuterol (XOPENEX) inhalation solution 1 25 mg  1 25 mg Nebulization Q6H PRN    sodium chloride 0 9 % infusion  75 mL/hr Intravenous Continuous    sodium chloride 0 9 % inhalation solution 3 mL  3 mL Nebulization Q6H PRN       amiodarone 1 mg/min    Followed by     amiodarone 0 5 mg/min    heparin (porcine) 3-20 Units/kg/hr (Order-Specific)    insulin regular (HumuLIN R,NovoLIN R) infusion 0 3-21 Units/hr Last Rate: Stopped (01/14/18 0900)   sodium chloride 75 mL/hr Last Rate: 75 mL/hr (01/14/18 0323)     No Known Allergies    OBJECTIVE:  Vitals:   Vitals:    01/14/18 1100   BP: 94/70   Pulse: (!) 135   Resp: (!) 36   Temp: (!) 96 7 °F (35 9 °C)   SpO2: 97%     Body mass index is 43 11 kg/m²      Systolic (24hrs), Av , Min:94 , UPF:700     Diastolic (85VSE), NDO:84, Min:70, Max:101      Intake/Output Summary (Last 24 hours) at 18 1122  Last data filed at 18 0400   Gross per 24 hour   Intake          2046 25 ml   Output              400 ml   Net          1646 25 ml     Weight (last 2 days)     Date/Time   Weight    18 2348  (!)  142 (313 49)            Invasive Devices     Peripheral Intravenous Line            Peripheral IV 18 Right Antecubital less than 1 day                PHYSICAL EXAMS:  General:  Patient is not in acute distress, laying in the bed comfortably, awake, alert responding to commands  Head: Normocephalic, Atraumatic  HEENT: White sclera, pink conjunctiva,  PERRLA,pharynx benign  Neck:  Supple,+ JVD carotids+2/+2 no bruits, thyromegaly, adenopathy  Respiratory:  Decreased breath sounds bilaterally  Cardiovascular:  PMI normal, S1-S2 normal, No  Murmurs, thrills, gallops, rubs  Irregularly irregular tachycardic  GI:  Abdomen soft nontender   No hepatosplenomegaly, adenopathy, ascites,or rebound tenderness  Extremities: No edema, normal pulses, no calf tenderness, no joint deformities, no venous disease   Integument:  No skin rashes or ulceration  Lymphatic:  No cervical or inguinal lymphadenopathy  Neurologic:  Patient is awake alert, responding to command, well-oriented to time and place and person moving all extremities    LABORATORY RESULTS:    Results from last 7 days  Lab Units 18  0925 18  0616 18  2349   TROPONIN I ng/mL 0 05* 0 06* 0 04     CBC with diff:   Results from last 7 days  Lab Units 18  0533 18  0420 18  2349   WBC Thousand/uL  --   --  7 43   HEMOGLOBIN g/dL  --   --  14 2   I STAT HEMOGLOBIN g/dl  --  12 2  --    HEMATOCRIT %  --   --  43 8   MCV fL  --   --  90   PLATELETS Thousands/uL 253  --  287   MCH pg  --   --  29 3   MCHC g/dL  --   --  32 4   RDW %  --   --  14 9   MPV fL 10 3  --  10 0   NRBC AUTO /100 WBCs --   --  0       CMP:  Results from last 7 days  Lab Units 01/14/18  0420 01/13/18  2349   SODIUM mmol/L  --  138   POTASSIUM mmol/L  --  4 0   CHLORIDE mmol/L  --  101   CO2 mmol/L  --  23   ANION GAP mmol/L  --  14*   BUN mg/dL  --  18   CREATININE mg/dL  --  1 35*   GLUCOSE RANDOM mg/dL  --  424*   GLUCOSE, ISTAT mg/dl 484*  --    CALCIUM mg/dL  --  9 3   AST U/L  --  46*   ALT U/L  --  106*   ALK PHOS U/L  --  100   TOTAL PROTEIN g/dL  --  7 8   ALBUMIN g/dL  --  4 2   BILIRUBIN TOTAL mg/dL  --  1 00   EGFR ml/min/1 73sq m  --  55       BMP:  Results from last 7 days  Lab Units 01/14/18  0420 01/13/18  2349   SODIUM mmol/L  --  138   POTASSIUM mmol/L  --  4 0   CHLORIDE mmol/L  --  101   CO2 mmol/L  --  23   BUN mg/dL  --  18   CREATININE mg/dL  --  1 35*   GLUCOSE RANDOM mg/dL  --  424*   GLUCOSE, ISTAT mg/dl 484*  --    CALCIUM mg/dL  --  9 3                      Results from last 7 days  Lab Units 01/13/18  2349   TSH 3RD GENERATON uIU/mL 3 661           Lipid Profile:   No results found for: CHOL  No results found for: HDL  No results found for: LDLCALC  No results found for: TRIG    Cardiac testing:   No results found for this or any previous visit  No results found for this or any previous visit  No procedure found  No results found for this or any previous visit  Imaging:   I have personally reviewed pertinent reports  EKG reviewed personally:  AFib with RVR, left anterior fascicular block, septal infarct age undetermined    Telemetry reviewed personally:   Rapid AFib    Assessment/Plan:  1  Atrial fibrillation with RVR--amiodarone drip to be initiated  Continue with heparin drip  Echocardiogram ordered and pending  Patient may need beta-blockers if amiodarone does not bring heart rate down  Will need to discuss anticoagulation when heart rate is improved  TSH 3 66    2  NSTEMI type 2 likely secondary to AFib with RVR & CHF; troponin 0 04/0 06/0 05  Echocardiogram pending  Continue all medications  Monitor telemetry  3   Acute congestive heart failure--systolic versus diastolic to be determined--continue with diuretics cautiously given low blood pressure  Monitor intake and output  Daily weights  Salt restriction  Monitor labs closely  4   Hypertension--on the lower side, blood pressure 94/70  Code Status: Level 1 - Full Code    Counseling / Coordination of Care  Total floor / unit time spent today 35 minutes  Greater than 50% of total time was spent with the patient and / or family counseling and / or coordination of care  A description of the counseling / coordination of care: Review of history, current assessment, development of a plan      Mayra Frederick PA-C  1/14/2018,11:22 AM

## 2018-01-14 NOTE — H&P
History and Physical - Critical Care   Pamela Ayala 60 y o  male MRN: [id number]  Unit/Bed#: ED 24 Encounter: [id number]    Reason for Admission / Chief Complaint:  Rapid heartbeat, nocturnal dyspnea    History of Present Illness:  Pamela Ayala is a 60 y o  male with past medical history significant for diabetes and hypertension who presents with a five-day history of nocturnal dyspnea and a 2 day history of rapid heartbeat  He states that he was started recently on Jardience by his cardiologist who is acting as his primary care physician  Patient states he does not have any cardiac problems  Patient states he stopped his Jardience 2 days ago when he developed chest congestion that he related to the onset of the medication  Patient also states he has not been taking his insulin as ordered due to running out of the medication  He is unsure of the last time he took his insulin  Patient denies chest pain, fever, abdominal pain  Denies productive cough  History obtained from the patient  Past Medical History:  Past Medical History:   Diagnosis Date    Diabetes mellitus ([address] )     Hyperlipidemia     Hypertension        Past Surgical History:  Past Surgical History:   Procedure Laterality Date    CHOLECYSTECTOMY      KNEE SURGERY Left     NECK SURGERY         Past Family History:  History reviewed  No pertinent family history      Social History:  History   Smoking Status    Never Smoker   Smokeless Tobacco    Never Used     History   Alcohol Use No     History   Drug Use No     Marital Status: /Civil Union      Medications:  Current Facility-Administered Medications   Medication Dose Route Frequency    chlorhexidine (PERIDEX) 0 12 % oral rinse 15 mL  15 mL Swish & Spit Q12H [address]    diltiazem (CARDIZEM) 125 mg/25 mL injection **AcuDose Override Pull**        heparin (porcine) subcutaneous injection 7,500 Units  7,500 Units Subcutaneous Q8H [address]    sodium chloride 0 9 % bolus 1,000 mL  1,000 mL Intravenous Once    sodium chloride 0 9 % infusion  75 mL/hr Intravenous Continuous     Home medications:  Prior to Admission medications    Not on File     Allergies:  No Known Allergies    ROS:   Review of Systems   Constitutional: Positive for fatigue  HENT: Positive for congestion  Eyes: Negative  Respiratory: Negative  Cardiovascular: Negative  Negative for chest pain, palpitations and leg swelling  Irregular rapid heart beat in past   Gastrointestinal: Negative  Endocrine: Positive for polyuria  Genitourinary: Negative  Musculoskeletal: Negative  Skin: Negative  Allergic/Immunologic: Negative  Neurological: Negative  Hematological: Negative  Psychiatric/Behavioral: Negative  Vitals:  Blood pressure 145/95, pulse (!) 121, temperature 97 7 °F (36 5 °C), temperature source Oral, resp  rate (!) 26, height 5' 11 5" (1 816 m), weight (!) 142 kg (313 lb 7 9 oz), SpO2 97 %  Temperature:   Temp (24hrs), Av 9 °F (36 6 °C), Min:97 9 °F (36 6 °C), Max:97 9 °F (36 6 °C)    Current Temperature: 97 9 °F (36 6 °C)    Weights:   IBW: 76 45 kg  Body mass index is 43 11 kg/m²  Hemodynamic Monitoring:     Non-Invasive/Invasive Ventilation Settings:  Respiratory    Lab Data (Last 4 hours)    None         O2/Vent Data (Last 4 hours)    None              No results found for: PHART, NAD4XQH, PO2ART, WFU4ITW, I4TCQMVP, BEART, SOURCE  SpO2: SpO2: 97 %     Physical Exam:   Physical Exam   Constitutional: He is oriented to person, place, and time  He appears well-developed and well-nourished  No distress  HENT:   Head: Normocephalic and atraumatic  Nose: Nose normal    Mouth/Throat: Oropharynx is clear and moist    Eyes: Conjunctivae and EOM are normal  Pupils are equal, round, and reactive to light  No scleral icterus  Neck: Normal range of motion  Neck supple  No JVD present  No tracheal deviation present  Cardiovascular: Intact distal pulses    An irregular rhythm present  Tachycardia present  Exam reveals distant heart sounds  Exam reveals no gallop and no friction rub  No murmur heard  Pulmonary/Chest: Effort normal  No accessory muscle usage  Tachypnea noted  No respiratory distress  He has decreased breath sounds in the right middle field, the right lower field, the left middle field and the left lower field  Able to complete short sentences without difficulty  Abdominal: Soft  Bowel sounds are normal  He exhibits no distension  There is no tenderness  There is no rebound and no guarding  Musculoskeletal: Normal range of motion  He exhibits no edema, tenderness or deformity  Neurological: He is alert and oriented to person, place, and time  He has normal strength  No cranial nerve deficit or sensory deficit  GCS eye subscore is 4  GCS verbal subscore is 5  GCS motor subscore is 6  Skin: Skin is warm and dry  No rash noted  No pallor  Psychiatric: He has a normal mood and affect   His behavior is normal        Labs:  Lab Results   Component Value Date    WBC 7 43 01/13/2018    HGB 14 2 01/13/2018    HCT 43 8 01/13/2018    MCV 90 01/13/2018     01/13/2018     Lab Results   Component Value Date    GLUCOSE 424 (H) 01/13/2018    CALCIUM 9 3 01/13/2018     01/13/2018    K 4 0 01/13/2018    CO2 23 01/13/2018     01/13/2018    BUN 18 01/13/2018    CREATININE 1 35 (H) 01/13/2018     Lab Results   Component Value Date    CALCIUM 9 3 01/13/2018     Lab Results   Component Value Date     01/13/2018    K 4 0 01/13/2018     01/13/2018    CO2 23 01/13/2018    ANIONGAP 14 (H) 01/13/2018    BUN 18 01/13/2018    CREATININE 1 35 (H) 01/13/2018    GLUCOSE 424 (H) 01/13/2018    CALCIUM 9 3 01/13/2018    AST 46 (H) 01/13/2018     (H) 01/13/2018    ALKPHOS 100 01/13/2018    PROT 7 8 01/13/2018    ALBUMIN 4 2 01/13/2018    BILITOT 1 00 01/13/2018    EGFR 55 01/13/2018     No results found for: INR, PROTIME  Lab Results   Component Value Date  (H) 01/13/2018    AST 46 (H) 01/13/2018    ALKPHOS 100 01/13/2018    BILITOT 1 00 01/13/2018     No results found for: TSH, Z6EPFMV, V6AQMPA, THYROIDAB  No results found for: HGBA1C      Imaging:  XR chest pa & lateral    (Results Pending)    Prominent pulmonary vasculature  Mild cardiomegaly  Independently reviewed by me  I have personally reviewed pertinent films in PACS  EKG:  Rapid atrial fib  Rate 130 on the cardiac monitor  This was personally reviewed by myself  Micro:  No results found for: Tosha Yi Seraron    ______________________________________________________________________    Assessment:   Patient Active Problem List   Diagnosis    HTN (hypertension)    Diabetes mellitus type 2 in obese [facility] )    Atrial fibrillation with RVR ([facility] )    Shortness of breath         Plan:      Neuro:  No acute issues identified  Sleep hygiene  CV:  Rapid atrial fibrillation  Lopressor for rate control  Continue to monitor blood pressure  Pulm:  HS BiPAP for suspected obesity hypoventilation  Patient complains that he has not been able to sleep, being awakened by dyspnea  Recommend pulmonary consult with sleep study as an outpatient  Check flu swab  GI:  No acute issues identified  :  UA for polyuria  Due to recent Jardience, at increased risk for infection  F/E/N:  Normal saline for fluid maintenance  NPO present  ID:  No leukocytosis or fever  Continue to monitor closely  Follow up on urinalysis  Heme:  Hemoglobin and platelets stable  Endo: Will use insulin drip to gain control of glucoses  Patient states he is only on metformin at home at this time  Check lactic acid  Check hemoglobin A1c and acetone level  Insulin per protocol  Msk/Skin:  No acute issues identified  Disposition:  Admit to step-down      Counseling / Coordination of Care  Total Critical Care time spent 35 minutes excluding procedures, teaching and family updates  ______________________________________________________________________    VTE Pharmacologic Prophylaxis: Heparin  VTE Mechanical Prophylaxis: sequential compression device    Invasive lines and devices: Invasive Devices     Peripheral Intravenous Line            Peripheral IV 01/13/18 Right Antecubital less than 1 day                Code Status: Level 1 - Full Code  POA:    POLST:      Given critical illness, patient length of stay will require greater than two midnights  Portions of the record may have been created with voice recognition software  Occasional wrong word or "sound a like" substitutions may have occurred due to the inherent limitations of voice recognition software  Read the chart carefully and recognize, using context, where substitutions have occurred        DANNY Carrion

## 2018-01-14 NOTE — ED NOTES
As per [place] ICU NP hold Cardizem drip and give Lopressor        Radhames Louis, RN  01/14/18 6088

## 2018-01-14 NOTE — CASE MANAGEMENT
Initial Clinical Review    Admission: Date/Time/Statement: 1/14/18 @ 0132     Orders Placed This Encounter   Procedures    Inpatient Admission (expected length of stay for this patient is greater than two midnights)     Standing Status:   Standing     Number of Occurrences:   1     Order Specific Question:   Admitting Physician     Answer:   Chel Mancera [[id number]]     Order Specific Question:   Level of Care     Answer:   Level 1 Stepdown [13]     Order Specific Question:   Estimated length of stay     Answer:   More than 2 Midnights     Order Specific Question:   Certification     Answer:   I certify that inpatient services are medically necessary for this patient for a duration of greater than two midnights  See H&P and MD Progress Notes for additional information about the patient's course of treatment  ED: Date/Time/Mode of Arrival:   ED Arrival Information     Expected Arrival Acuity Means of Arrival Escorted By Service Admission Type    - 1/13/2018 23:26 Emergent Walk-In Family Member Critical Care/ICU Emergency    Arrival Complaint    difficulty breathing          Chief Complaint:   Chief Complaint   Patient presents with    Shortness of Breath     Pt c/o SOB since [address] without chest pain  Pt has dypnea on excertion and rest  Pt recently started Jaurdiance  Denies N/V       History of Dennis Surjit is a 60 y o  male with past medical history significant for diabetes and hypertension who presents with a five-day history of nocturnal dyspnea and a 2 day history of rapid heartbeat  He states that he was started recently on Jardience by his cardiologist who is acting as his primary care physician  Patient states he does not have any cardiac problems  Patient states he stopped his Jardience 2 days ago when he developed chest congestion that he related to the onset of the medication  Patient also states he has not been taking his insulin as ordered due to running out of the medication    He is unsure of the last time he took his insulin  Patient denies chest pain, fever, abdominal pain    Denies productive cough        ED Vital Signs:   ED Triage Vitals   Temperature Pulse Respirations Blood Pressure SpO2   01/13/18 2351 01/13/18 2347 01/13/18 2347 01/13/18 2347 01/13/18 2347   97 9 °F (36 6 °C) (!) 162 (!) 28 121/91 95 %      Temp Source Heart Rate Source Patient Position - Orthostatic VS BP Location FiO2 (%)   01/13/18 2351 01/13/18 2347 01/13/18 2347 01/13/18 2347 --   Oral Monitor Lying Right arm       Pain Score       01/13/18 2347       No Pain        Wt Readings from Last 1 Encounters:   01/13/18 (!) 142 kg (313 lb 7 9 oz)       Vital Signs (abnormal):   01/14/18 1100   96 7 °F (35 9 °C)   135   36  94/70  78  97 %  Nasal cannula  Lying   01/14/18 1049  --  --  --  --  --  100 %  Nasal cannula  --   01/14/18 1047  --  --  --  --               01/14/18 0437  --   109   29  --  --  100 %  --  --   01/14/18 0330  97 7 °F (36 5 °C)  --  --  --  --  --  --  --   01/14/18 0245  --   121   26  145/95  --  97 %  None (Room air)  Sitting   01/14/18 0145  --   117   51  110/71  --  94 %  None (Room air)  Lying   01/14/18 0045  --   134   26   121/101  --  97 %  None (Room        Abnormal Labs/Diagnostic Test Results:an gap  14, BUN creat 14  1 35, gluc 424, ast 46, alt  106    ED Treatment:   Medication Administration from 01/13/2018 2326 to 01/14/2018 9817       Date/Time Order Dose Route Action Action by Comments     01/14/2018 0045 sodium chloride 0 9 % bolus 1,000 mL 0 mL Intravenous Stopped Michelle Hanks RN      01/14/2018 0015 sodium chloride 0 9 % bolus 1,000 mL 1,000 mL Intravenous New Bag Michelle Hanks RN      01/14/2018 0009 diltiazem (CARDIZEM) injection 15 mg 15 mg Intravenous Given Syble Hanks, RN      01/14/2018 0145 sodium chloride 0 9 % bolus 1,000 mL 0 mL Intravenous Stopped Michelle Hanks RN      01/14/2018 0115 sodium chloride 0 9 % bolus 1,000 mL 1,000 mL Intravenous New Bag Carly Pace RN      01/14/2018 0212 metoprolol (LOPRESSOR) injection 5 mg 5 mg Intravenous Given Carly Pace RN           Past Medical/Surgical History: Active Ambulatory Problems     Diagnosis Date Noted    No Active Ambulatory Problems     Resolved Ambulatory Problems     Diagnosis Date Noted    No Resolved Ambulatory Problems     Past Medical History:   Diagnosis Date    Diabetes mellitus [facility] )     Hyperlipidemia     Hypertension        Admitting Diagnosis: Dyspnea [R06 00]  SOB (shortness of breath) [R06 02]  Hyperglycemia [R73 9]  Rapid atrial fibrillation (HCC) [I48 91]    Age/Sex: 60 y o  male    Assessment/Plan: Assessment:       Patient Active Problem List   Diagnosis    HTN (hypertension)    Diabetes mellitus type 2 in obese ([facility] )    Atrial fibrillation with RVR (HCC)    Shortness of breath       Plan:                  Neuro:  No acute issues identified  Sleep hygiene                  CV:  Rapid atrial fibrillation  Lopressor for rate control  Continue to monitor blood pressure                  Pulm:  HS BiPAP for suspected obesity hypoventilation  Patient complains that he has not been able to sleep, being awakened by dyspnea  Recommend pulmonary consult with sleep study as an outpatient  Check flu swab                  GI:  No acute issues identified                  :  UA for polyuria  Due to recent Jardience, at increased risk for infection                  F/E/N:  Normal saline for fluid maintenance  NPO present                  ID:  No leukocytosis or fever  Continue to monitor closely  Follow up on urinalysis                 Heme:  Hemoglobin and platelets stable                  Endo: Will use insulin drip to gain control of glucoses  Patient states he is only on metformin at home at this time  Check lactic acid  Check hemoglobin A1c and acetone level    Insulin per protocol                  Msk/Skin:  No acute issues identified     Disposition:  Admit to step-down      Counseling / Coordination of Care  Total Critical Care time spent 35 minutes excluding procedures, teaching and family updates        VTE Pharmacologic Prophylaxis: Heparin  VTE Mechanical Prophylaxis: sequential compression device      Code Status: Level 1 - Full Code  POA:    POLST:  Given critical illness, patient length of stay will require greater than two midnights  Admission Orders:  Scheduled Meds:   amiodarone (CORDARONE) IV bolus 150 mg Intravenous Once   chlorhexidine 15 mL Swish & Spit Q12H [address]   furosemide 40 mg Intravenous Once   heparin (porcine) 4,000 Units Intravenous Once     Continuous Infusions:   amiodarone 1 mg/min    Followed by     amiodarone 0 5 mg/min    heparin (porcine) 3-20 Units/kg/hr (Order-Specific)    insulin regular (HumuLIN R,NovoLIN R) infusion 0 3-21 Units/hr Last Rate: Stopped (01/14/18 0900)     PRN Meds: heparin (porcine)    heparin (porcine)    levalbuterol    sodium chloride     Fall precautions  Cons carb diet   Enc deep breathing and coughing   Fingerstick bs q2hr   Daily weight   I&O   Neuro checks q2hr   Echo   ptt q6hr  Bmp, cbc   ,mg, vbg , bnp , coags 1/14  OT PT eval   Cardiology consult   SCD  ua cx     Thank you,  [facility] in the [facility] by [facility] Rouse for 2017  Network Utilization Review Department  Phone: [phone]; Fax [phone]  ATTENTION: The Network Utilization Review Department is now centralized for our 7 Facilities  Make a note that we have a new phone and fax numbers for our Department  Please call with any questions or concerns to [phone] and carefully follow the prompts so that you are directed to the right person  All voicemails are confidential  Fax any determinations, approvals, denials, and requests for initial or continue stay review clinical to [phone]   Due to HIGH CALL volume, it would be easier if you could please send faxed requests to expedite your requests and in part, help us provide discharge notifications faster

## 2018-01-15 LAB
ANION GAP SERPL CALCULATED.3IONS-SCNC: 14 MMOL/L (ref 4–13)
APTT PPP: 39 SECONDS (ref 23–35)
APTT PPP: 45 SECONDS (ref 23–35)
APTT PPP: 67 SECONDS (ref 23–35)
APTT PPP: 69 SECONDS (ref 23–35)
BASOPHILS # BLD AUTO: 0.1 THOUSANDS/ΜL (ref 0–0.1)
BASOPHILS NFR BLD AUTO: 1 % (ref 0–1)
BUN SERPL-MCNC: 30 MG/DL (ref 5–25)
CALCIUM SERPL-MCNC: 9.4 MG/DL (ref 8.3–10.1)
CHLORIDE SERPL-SCNC: 106 MMOL/L (ref 100–108)
CO2 SERPL-SCNC: 19 MMOL/L (ref 21–32)
CREAT SERPL-MCNC: 1.24 MG/DL (ref 0.6–1.3)
EOSINOPHIL # BLD AUTO: 0.03 THOUSAND/ΜL (ref 0–0.61)
EOSINOPHIL NFR BLD AUTO: 0 % (ref 0–6)
ERYTHROCYTE [DISTWIDTH] IN BLOOD BY AUTOMATED COUNT: 15.4 % (ref 11.6–15.1)
GFR SERPL CREATININE-BSD FRML MDRD: 61 ML/MIN/1.73SQ M
GLUCOSE SERPL-MCNC: 101 MG/DL (ref 65–140)
GLUCOSE SERPL-MCNC: 104 MG/DL (ref 65–140)
GLUCOSE SERPL-MCNC: 120 MG/DL (ref 65–140)
GLUCOSE SERPL-MCNC: 138 MG/DL (ref 65–140)
GLUCOSE SERPL-MCNC: 139 MG/DL (ref 65–140)
GLUCOSE SERPL-MCNC: 144 MG/DL (ref 65–140)
GLUCOSE SERPL-MCNC: 152 MG/DL (ref 65–140)
GLUCOSE SERPL-MCNC: 158 MG/DL (ref 65–140)
GLUCOSE SERPL-MCNC: 196 MG/DL (ref 65–140)
GLUCOSE SERPL-MCNC: 232 MG/DL (ref 65–140)
GLUCOSE SERPL-MCNC: 237 MG/DL (ref 65–140)
HCT VFR BLD AUTO: 42.9 % (ref 36.5–49.3)
HGB BLD-MCNC: 13.6 G/DL (ref 12–17)
LYMPHOCYTES # BLD AUTO: 1.03 THOUSANDS/ΜL (ref 0.6–4.47)
LYMPHOCYTES NFR BLD AUTO: 11 % (ref 14–44)
MAGNESIUM SERPL-MCNC: 2 MG/DL (ref 1.6–2.6)
MCH RBC QN AUTO: 29.2 PG (ref 26.8–34.3)
MCHC RBC AUTO-ENTMCNC: 31.7 G/DL (ref 31.4–37.4)
MCV RBC AUTO: 92 FL (ref 82–98)
MONOCYTES # BLD AUTO: 0.86 THOUSAND/ΜL (ref 0.17–1.22)
MONOCYTES NFR BLD AUTO: 9 % (ref 4–12)
NEUTROPHILS # BLD AUTO: 7.24 THOUSANDS/ΜL (ref 1.85–7.62)
NEUTS SEG NFR BLD AUTO: 78 % (ref 43–75)
NRBC BLD AUTO-RTO: 0 /100 WBCS
PHOSPHATE SERPL-MCNC: 4 MG/DL (ref 2.3–4.1)
PLATELET # BLD AUTO: 284 THOUSANDS/UL (ref 149–390)
PMV BLD AUTO: 10 FL (ref 8.9–12.7)
POTASSIUM SERPL-SCNC: 3.9 MMOL/L (ref 3.5–5.3)
RBC # BLD AUTO: 4.65 MILLION/UL (ref 3.88–5.62)
SODIUM SERPL-SCNC: 139 MMOL/L (ref 136–145)
WBC # BLD AUTO: 9.3 THOUSAND/UL (ref 4.31–10.16)

## 2018-01-15 PROCEDURE — 82948 REAGENT STRIP/BLOOD GLUCOSE: CPT

## 2018-01-15 PROCEDURE — 85730 THROMBOPLASTIN TIME PARTIAL: CPT | Performed by: NURSE PRACTITIONER

## 2018-01-15 PROCEDURE — 83735 ASSAY OF MAGNESIUM: CPT | Performed by: NURSE PRACTITIONER

## 2018-01-15 PROCEDURE — 94760 N-INVAS EAR/PLS OXIMETRY 1: CPT

## 2018-01-15 PROCEDURE — 94660 CPAP INITIATION&MGMT: CPT

## 2018-01-15 PROCEDURE — 80048 BASIC METABOLIC PNL TOTAL CA: CPT | Performed by: NURSE PRACTITIONER

## 2018-01-15 PROCEDURE — 84100 ASSAY OF PHOSPHORUS: CPT | Performed by: NURSE PRACTITIONER

## 2018-01-15 PROCEDURE — 85025 COMPLETE CBC W/AUTO DIFF WBC: CPT | Performed by: NURSE PRACTITIONER

## 2018-01-15 RX ORDER — FUROSEMIDE 10 MG/ML
40 INJECTION INTRAMUSCULAR; INTRAVENOUS ONCE
Status: COMPLETED | OUTPATIENT
Start: 2018-01-15 | End: 2018-01-15

## 2018-01-15 RX ORDER — CARVEDILOL 12.5 MG/1
12.5 TABLET ORAL 2 TIMES DAILY WITH MEALS
Status: DISCONTINUED | OUTPATIENT
Start: 2018-01-15 | End: 2018-01-16

## 2018-01-15 RX ADMIN — CARVEDILOL 6.25 MG: 6.25 TABLET, FILM COATED ORAL at 07:49

## 2018-01-15 RX ADMIN — INSULIN LISPRO 2 UNITS: 100 INJECTION, SOLUTION INTRAVENOUS; SUBCUTANEOUS at 23:37

## 2018-01-15 RX ADMIN — FUROSEMIDE 40 MG: 10 INJECTION, SOLUTION INTRAVENOUS at 07:49

## 2018-01-15 RX ADMIN — HEPARIN SODIUM 2000 UNITS: 1000 INJECTION, SOLUTION INTRAVENOUS; SUBCUTANEOUS at 04:01

## 2018-01-15 RX ADMIN — HEPARIN SODIUM AND DEXTROSE 17.1 UNITS/KG/HR: 10000; 5 INJECTION INTRAVENOUS at 05:35

## 2018-01-15 RX ADMIN — CHLORHEXIDINE GLUCONATE 15 ML: 1.2 RINSE ORAL at 08:00

## 2018-01-15 RX ADMIN — CHLORHEXIDINE GLUCONATE 15 ML: 1.2 RINSE ORAL at 20:34

## 2018-01-15 RX ADMIN — HEPARIN SODIUM 4000 UNITS: 1000 INJECTION, SOLUTION INTRAVENOUS; SUBCUTANEOUS at 10:41

## 2018-01-15 RX ADMIN — FUROSEMIDE 40 MG: 10 INJECTION, SOLUTION INTRAMUSCULAR; INTRAVENOUS at 20:34

## 2018-01-15 RX ADMIN — HEPARIN SODIUM AND DEXTROSE 21.1 UNITS/KG/HR: 10000; 5 INJECTION INTRAVENOUS at 19:09

## 2018-01-15 RX ADMIN — CARVEDILOL 12.5 MG: 12.5 TABLET, FILM COATED ORAL at 16:53

## 2018-01-15 RX ADMIN — INSULIN LISPRO 2 UNITS: 100 INJECTION, SOLUTION INTRAVENOUS; SUBCUTANEOUS at 17:54

## 2018-01-15 NOTE — PROGRESS NOTES
Progress Note - Critical Care   Karlene Vicente 60 y o  male MRN: [id number]  Unit/Bed#:  Encounter: [id number]    Attending Physician: Fina Strickland, DO      ______________________________________________________________________  Assessment and Plan:   Principal Problem:    Atrial fibrillation with RVR ([facility] )  Active Problems:    HTN (hypertension)    Diabetes mellitus type 2 in obese ([place] [address] )    Shortness of breath    Hyperglycemia  Resolved Problems:    * No resolved hospital problems  *        Neuro:  No acute issues identified  CV:  Continue heparin and amiodarone for rapid atrial fibrillation and type 2 non STEMI, possibly due to tachyarrhythmia  Patient with acute systolic heart failure with an undetermined etiology  Continue Coreg  Continue to monitor  Pulm:  Patient with recent history of nocturnal dyspnea, accepted BiPAP overnight last night with improvement of work of breathing  Encourage incentive spirometry and mobilization out of bed during the day to prevent atelectasis  Mild diuresis yesterday, may wish to repeat today  GI:  No active issues identified  :  No active issues identified  F/E/N:  Replete electrolytes as needed  Tolerated p o  diet  ID:  No active issues identified  Continue to monitor  Heme:  Hemoglobin and platelets stable  Continue to monitor  Endo: Insulin per algorithm 2  Continue to monitor  Msk/Skin:  No issues identified  PT/OT and early mobilization  Disposition:  Continue step-down level of care  Code Status: Level 1 - Full Code    Counseling / Coordination of Care  Total time spent today 35 minutes  Greater than 50% of total time was spent with the patient and / or family counseling and / or coordination of care    ______________________________________________________________________    HPI/24 Hour Events:   Admitted for hyperglycemia and rapid AFib  Attempt to control Lopressor, with minimal improvement    Started on amiodarone and heparin yesterday  Patient found to have systolic heart failure with significant nocturnal dyspnea, but resistant to nocturnal BiPAP       ______________________________________________________________________    Physical Exam:   GEN:  No acute distress, anxious at times  HEENT:  Sclera anicteric, mucous membranes pink and moist, conjunctiva pink, no abhijit/rhinorrhea  Neck:  No lymphadenopathy, normal ROM, supple, no bruits  CV :  S1S2, no murmurs, rubs or gallops  Intact distal pulses  No JVD  Irregularly irregular  Resp:  Lungs CTA =B/L  No subq air or crepitus  Symmetrical expansion  No cough noted  GI :  Abd soft, nontender, no guarding/rebound, nondistended, normoactive bowel sounds X4 quads, no organomegaly appreciated  Neuro:  CN II-XII grossly intact, nonfocal exam, speech clear, GCS 15  Psych:  Appropriate affect      ______________________________________________________________________  Blood pressure 108/82, pulse (!) 113, temperature (!) 97 2 °F (36 2 °C), temperature source Oral, resp  rate (!) 25, height 5' 11 5" (1 816 m), weight (!) 142 kg (313 lb 7 9 oz), SpO2 100 %  Temperature:   Temp (24hrs), Av 3 °F (36 3 °C), Min:96 7 °F (35 9 °C), Max:97 9 °F (36 6 °C)    Current Temperature: (!) 97 2 °F (36 2 °C)  Weights:   IBW: 76 45 kg    Body mass index is 43 11 kg/m²  Weight (last 2 days)     Date/Time   Weight    18 2348  (!)  142 (313 49)              Non-Invasive/Invasive Ventilation Settings:  Respiratory    Lab Data (Last 4 hours)    None         O2/Vent Data (Last 4 hours)      01/15 0406          Non-Invasive Ventilation Mode BiPAP                 No results found for: PHART, PBC5IRX, PO2ART, DGL4RNW, Z2DBRKOU, BEART, SOURCE  SpO2: SpO2: 100 %  Intake and Outputs:  I/O        07 -  0700  07 - 01/15 0700    P  O   300    I V  (mL/kg) 46 3 (0 3) 1078 2 (7 6)    IV Piggyback  100    Total Intake(mL/kg) 2046 3 (14 4) 1478 2 (10 4)    Urine (mL/kg/hr) 400 1450 (0 4)    Total Output 400 1450    Net +1646 3 +28 2                Nutrition:        Diet Orders            Start     Ordered    01/14/18 1050  Diet Dinesh/CHO Controlled; Consistent Carbohydrate Diet Level 3 (6 carb servings/90 grams CHO/meal); Cardiac Step 1  Diet effective now     Question Answer Comment   Diet Type Dinesh/CHO Controlled    Dinesh/CHO Controlled Consistent Carbohydrate Diet Level 3 (6 carb servings/90 grams CHO/meal)    Other Restriction(s): Cardiac Step 1    RD to adjust diet per protocol?  No        01/14/18 1049          Labs:   Lab Results   Component Value Date    WBC 9 30 01/15/2018    HGB 13 6 01/15/2018    HCT 42 9 01/15/2018    MCV 92 01/15/2018     01/15/2018     Lab Results   Component Value Date    GLUCOSE 139 01/15/2018    CALCIUM 9 4 01/15/2018     01/15/2018    K 3 9 01/15/2018    CO2 19 (L) 01/15/2018     01/15/2018    BUN 30 (H) 01/15/2018    CREATININE 1 24 01/15/2018     Lab Results   Component Value Date    CALCIUM 9 4 01/15/2018    PHOS 4 0 01/15/2018     Lab Results   Component Value Date     01/15/2018    K 3 9 01/15/2018     01/15/2018    CO2 19 (L) 01/15/2018    ANIONGAP 14 (H) 01/15/2018    BUN 30 (H) 01/15/2018    CREATININE 1 24 01/15/2018    GLUCOSE 139 01/15/2018    CALCIUM 9 4 01/15/2018    AST 46 (H) 01/13/2018     (H) 01/13/2018    ALKPHOS 100 01/13/2018    PROT 7 8 01/13/2018    ALBUMIN 4 2 01/13/2018    BILITOT 1 00 01/13/2018    EGFR 61 01/15/2018     Lab Results   Component Value Date    INR 1 13 01/14/2018    PROTIME 14 8 (H) 01/14/2018     Lab Results   Component Value Date     (H) 01/13/2018    AST 46 (H) 01/13/2018    ALKPHOS 100 01/13/2018    BILITOT 1 00 01/13/2018     No results found for: TSH, X8KKQVQ, Q8WMZAI, THYROIDAB  Lab Results   Component Value Date    HGBA1C 9 4 (H) 01/14/2018         Imaging:  None today  EKG:  AFib on cardiac monitor  Micro:  No results found for: Michelle Jones, SPUTUMCULTUR  Allergies: No Known Allergies  Medications:   Scheduled Meds:  carvedilol 6 25 mg Oral BID With Meals   chlorhexidine 15 mL Swish & Spit Q12H [address]     Continuous Infusions:  heparin (porcine) 3-20 Units/kg/hr (Order-Specific) Last Rate: 17 1 Units/kg/hr (01/15/18 0403)   insulin regular (HumuLIN R,NovoLIN R) infusion 0 3-21 Units/hr Last Rate: 1 5 Units/hr (01/15/18 0212)     PRN Meds:    acetaminophen 650 mg Q6H PRN   heparin (porcine) 2,000 Units PRN   heparin (porcine) 4,000 Units PRN   levalbuterol 1 25 mg Q6H PRN   sodium chloride 3 mL Q6H PRN     VTE Pharmacologic Prophylaxis: Heparin Drip  VTE Mechanical Prophylaxis: sequential compression device  Invasive lines and devices: Invasive Devices     Peripheral Intravenous Line            Peripheral IV 01/13/18 Right Antecubital 1 day    Peripheral IV 01/14/18 Left Forearm less than 1 day    Peripheral IV 01/14/18 Right Arm less than 1 day                     Portions of the record may have been created with voice recognition software  Occasional wrong word or "sound a like" substitutions may have occurred due to the inherent limitations of voice recognition software  Read the chart carefully and recognize, using context, where substitutions have occurred      Ulysses Clan, CRNP

## 2018-01-15 NOTE — PLAN OF CARE
DISCHARGE PLANNING     Discharge to home or other facility with appropriate resources Progressing        INFECTION - ADULT     Absence or prevention of progression during hospitalization Progressing        PAIN - ADULT     Verbalizes/displays adequate comfort level or baseline comfort level Progressing        Potential for Falls     Patient will remain free of falls Progressing        Prexisting or High Potential for Compromised Skin Integrity     Skin integrity is maintained or improved Progressing        RESPIRATORY - ADULT     Achieves optimal ventilation and oxygenation Progressing        SAFETY ADULT     Maintain or return to baseline ADL function Progressing     Maintain or return mobility status to optimal level Progressing

## 2018-01-15 NOTE — CASE MANAGEMENT
[address] [place] [address] in the [facility] by Thomas Rouse for 2017  Network Utilization Review Department  Phone: [phone]; Fax [phone]  ATTENTION: The Network Utilization Review Department is now centralized for our 7 Facilities  Please call with any questions or concerns to [phone] and carefully follow the prompts so that you are directed to the right person  All voicemails are confidential  Fax any determinations, approvals, denials, and requests for initial or continue stay review clinical to [phone]  Due to HIGH CALL volume, it would be easier if you could please send faxed requests to expedite your requests and in part, help us provide discharge notifications faster   /////////////////////////////////////////////////////////////////////////////////////////////////////////////////    Initial Clinical Review     Admission: Date/Time/Statement: 1/14/18 @ 0132            Orders Placed This Encounter   Procedures    Inpatient Admission (expected length of stay for this patient is greater than two midnights)       Standing Status:   Standing       Number of Occurrences:   1       Order Specific Question:   Admitting Physician       Answer:   Josué Beatty [[id number]]       Order Specific Question:   Level of Care       Answer:   Level 1 Stepdown [13]       Order Specific Question:   Estimated length of stay       Answer:   More than 2 Midnights       Order Specific Question:   Certification       Answer:   I certify that inpatient services are medically necessary for this patient for a duration of greater than two midnights   See H&P and MD Progress Notes for additional information about the patient's course of treatment             ED: Date/Time/Mode of Arrival:             ED Arrival Information      Expected Arrival Acuity Means of Arrival Escorted By Service Admission Type     - 1/13/2018 23:26 Emergent Walk-In Family Member Critical Care/ICU Emergency     Arrival Complaint     difficulty breathing             Chief Complaint:        Chief Complaint   Patient presents with    Shortness of Breath       Pt c/o SOB since Tueday without chest pain  Pt has dypnea on excertion and rest  Pt recently started Jaurdiance  Denies N/V         History of Illness:Mateus Clarke is a 60 y  o  male with past medical history significant for diabetes and hypertension and HLD,  presents with a five-day history of nocturnal dyspnea and a 2 day history of rapid heartbeat   He states that he was started recently on Jardience by his cardiologist who is acting as his primary care physician  Isela Person states he does not have any cardiac problems   Patient states he stopped his Jardience 2 days ago when he developed chest congestion that he related to the onset of the medication   Patient also states he has not been taking his insulin as ordered due to running out of the medication       ED Vital Signs:            ED Triage Vitals   Temperature Pulse Respirations Blood Pressure SpO2   01/13/18 2351 01/13/18 2347 01/13/18 2347 01/13/18 2347 01/13/18 2347   97 9 °F (36 6 °C) (!) 162 (!) 28 121/91 95 %       Temp Source Heart Rate Source Patient Position - Orthostatic VS BP Location FiO2 (%)   01/13/18 2351 01/13/18 2347 01/13/18 2347 01/13/18 2347 --   Oral Monitor Lying Right arm         Pain Score           01/13/18 2347           No Pain                Wt Readings from Last 1 Encounters:   01/13/18 (!) 142 kg (313 lb 7 9 oz)         Vital Signs (abnormal):   01/14/18 1100    96 7 °F (35 9 °C)    135    36   94/70   78   97 %   Nasal cannula   Lying   01/14/18 1049   --   --   --   --   --   100 %   Nasal cannula   --   01/14/18 1047   --   --   --   --                         01/14/18 0437   --    109    29   --   --   100 %   --   --   01/14/18 0330   97 7 °F (36 5 °C)   --   --   --   --   --   --   --   01/14/18 0245   --    121    26   145/95   --   97 %   None (Room air)   Sitting   01/14/18 0145   --    117    51   110/71   --   94 %   None (Room air)   Lying   01/14/18 0045   --    134    26    121/101   --   97 %   None (Room           Abnormal Labs/Diagnostic Test Results:an gap  14, BUN creat 14  1 35, gluc 424, ast 46, alt  106     ED Treatment:              Medication Administration from 01/13/2018 2326 to 01/14/2018 3546        Date/Time Order Dose Route Action Action by Comments                   01/14/2018 0015 sodium chloride 0 9 % bolus 1,000 mL 1,000 mL Intravenous New Bag Gloria Burrell, YASMEEN         01/14/2018 0009 diltiazem (CARDIZEM) injection 15 mg 15 mg Intravenous Given Gloria Burrell RN                     01/14/2018 0115 sodium chloride 0 9 % bolus 1,000 mL 1,000 mL Intravenous New Bag Gloria Burrell, YASMEEN         01/14/2018 0137 metoprolol (LOPRESSOR) injection 5 mg 5 mg Intravenous Given Gloria Burrell, YASMEEN              Admitting Diagnosis: Dyspnea [R06 00]  SOB (shortness of breath) [R06 02]  Hyperglycemia [R73 9]  Rapid atrial fibrillation (HCC) [I48 91]      Assessment:         Patient Active Problem List   Diagnosis    HTN (hypertension)    Diabetes mellitus type 2 in obese ([facility] [address] [facility]    Atrial fibrillation with RVR ([facility] [address] )    Shortness of breath       Plan:                  Neuro:  No acute issues identified   Sleep hygiene                  RQ:  Rapid atrial fibrillation   Lopressor for rate control   Continue to monitor blood pressure                  Pulm:  HS BiPAP for suspected obesity hypoventilation   Patient complains that he has not been able to sleep, being awakened by dyspnea   Recommend pulmonary consult with sleep study as an outpatient   Check flu swab                   GI:  No acute issues identified                  : Mason Glass for polyuria   Due to recent Jardience, at increased risk for infection                  F/E/N:  Normal saline for fluid maintenance   NPO present                  ID:  No leukocytosis or fever   Continue to monitor closely   Follow up on urinalysis                 Heme:  Hemoglobin and platelets stable                  Endo:  Will use insulin drip to gain control of glucoses   Patient states he is only on metformin at home at this time   Check lactic acid   Check hemoglobin A1c and acetone level   Insulin per protocol                  Msk/Skin:  No acute issues identified                  Disposition:  Admit to step-down      Counseling / Coordination of Care  Total Critical Care time spent 35 minutes excluding procedures, teaching and family updates        VTE Pharmacologic Prophylaxis: Heparin  VTE Mechanical Prophylaxis: sequential compression device     1/14  Admission Orders:  Admit telemetry  Continuous insulin gtt, heparin gtt,  Amiodarone gtt     Scheduled Meds:   amiodarone (CORDARONE) IV bolus 150 mg Intravenous Once   chlorhexidine 15 mL Swish & Spit Q12H [facility]   furosemide 40 mg Intravenous Once   heparin (porcine) 4,000 Units Intravenous Once      Continuous Infusions:   amiodarone 1 mg/min     Followed by       amiodarone 0 5 mg/min     heparin (porcine) 3-20 Units/kg/hr (Order-Specific)     insulin regular (HumuLIN R,NovoLIN R) infusion 0 3-21 Units/hr Last Rate: Stopped (01/14/18 0900)      Fall precautions  Cons carb diet   Enc deep breathing and coughing   Fingerstick bs q2hr   Daily weight   I&O   Neuro checks q2hr   Echo   ptt q6hr  Bmp, cbc   ,mg, vbg , bnp , coags 1/14  OT PT eval   Cardiology consult   SCD  ua cx     1/14/2018   Cardiology:   AFib with RVR, left anterior fascicular block, septal infarct age undetermined   Assessment/Plan:  1  Atrial fibrillation with RVR--amiodarone drip to be initiated  Continue with heparin drip  Echocardiogram ordered and pending  Patient may need beta-blockers if amiodarone does not bring heart rate down  Will need to discuss anticoagulation when heart rate is improved  TSH 3 66     2  NSTEMI type 2 likely secondary to AFib with RVR & CHF; troponin 0 04/0 06/0 05    Echocardiogram pending  Continue all medications  Monitor telemetry       3  Acute congestive heart failure--systolic versus diastolic to be determined--continue with diuretics cautiously given low blood pressure  Monitor intake and output  Daily weights  Salt restriction  Monitor labs closely      4  Hypertension--on the lower side, blood pressure 94/70         1/14   Meds:  Insulin gtt;  Heparin gtt; amiodarone gtt ;   IVF NS @ 75  (dc'ed)   xopenex nebs prn (x2)  cardizem 15 mg IV @ 0009  Amiodarone 150 mg  IV  @ 1206  Lopressor 5 mg IV  @ 0137 and 0322  And 0604  Lasix 40 IV x1      Denisha Brett 1/15/2018  HPI/24 Hour Events:   Admitted for hyperglycemia and rapid AFib  Attempt to control Lopressor, with minimal improvement  Started on amiodarone gtt  and heparin gtt  yesterday  Patient found to have systolic heart failure with significant nocturnal dyspnea, but resistant to nocturnal BiPAP  Vs:  119   27   114/85   100% on RA    Continuous insulin gtt ;  Heparin gtt; Lasix 40  IV x1 this am;  Amiodarone GTT     CV:  Continue heparin and amiodarone for rapid atrial fibrillation and type 2 non STEMI, possibly due to tachyarrhythmia  Patient with acute systolic heart failure with an undetermined etiology  Continue Coreg  Continue to monitor    Pulm:  Patient with recent history of nocturnal dyspnea, accepted BiPAP overnight last night with improvement of work of breathing  Encourage incentive spirometry and mobilization out of bed during the day to prevent atelectasis  Mild diuresis yesterday, may wish to repeat today    F/E/N:  Replete electrolytes as needed  Tolerated p o  Diet  Endo: Insulin per algorithm 2  Continue to monitor  Msk/Skin:  No issues identified  PT/OT and early mobilization    Disposition:  Continue step-down level of care        1/15 CARDIOLODY   Acute systolic CHF, AFib with rapid ventricular response  Systolic dysfunction is probably tachy mediated due to AFib but ischemia cannot be ruled out  Continue beta-blocker for rate control  Amiodarone if patient is hypotensive  Continue heparin drip  He may need cardiac catheterization before discharge to rule out ischemia once the heart rate is not controlled and dyspnea improves  Continue IV Lasix for now monitor and correct electrolytes  Monitor serum creatinine  Plan:  1  Atrial fibrillation with RVR:  Patient on amiodarone drip and Coreg 6 25 mg p o  b i d  Continue with heparin drip  Echocardiogram pending  Will discuss anticoagulation when heart rate is improved  Heart rate in the 110s      2  NSTEMI type 2 likely secondary to atrial fibrillation with RVR and Congestive heart failure:  Troponins minimally elevated  Echocardiogram pending  Continue all medications  Monitor telemetry      3  Acute congestive heart failure:  Systolic versus diastolic to be determined  Echo pending  Continue diuresis cautiously as patient is hypotensive  Low-salt diet, monitor daily weights, I/O  Last recorded serum creatinine 1 24      4  Hypertension:  Patient currently has soft BP  Last recorded BP 99/56  Continue to monitor      @ 1707  Amiodarone  And Insulin gtt's dc'ed      Dara Soulier 1/16/2018  Continues heparin gtt  On bipap thru noc     Report    01/15 0701  01/16 0700 01/16 0701  01/16 1216  Most Recent    Temperature (°F) 97  598 1 97 9  97 9 (36 6)    Pulse 106122 108  108    Respirations 2047 23  23    Blood Pressure 82/66126/58 129/78  129/78    SpO2 (%) 95100 96  96            Discharge Plan: tbd

## 2018-01-15 NOTE — SOCIAL WORK
Cm met with pt and daughter at bedside  Pt recently moved here from [place] and is living with his daughter Irish Aguirre in a 2 story house with no steps to navigate  His wife lives in [facility]  Normally, he has no problem navigating steps and is independent with ADL's  He uses no DME's  He has used OP/PT years ago in [place] following knee issues  His PCP and cardiologist are located in [facility]   CM supplied [employer] card to assist in finding a local [address]  Denies substance abuse issues  Denies hx of anxiety or depression  He does not have a POA or Advanced Directive  CM supplied info on both  He uses [facility] in [place] and his daughter tells me that he does have difficulty paying the co-pays  CM asked Chey Ceja from financial to eval for any assistance  He is retired, but still drives  Daughter Irish Aguirre will transport home when medically cleared  CM discussed discharge needs  Pt is hoping that he will not need services when discharged, but does give CM permission to place referal to [facility] agency  He does not have a preference  CM will place referral and keep pt informed  CM will continue to follow

## 2018-01-15 NOTE — PLAN OF CARE
Problem: DISCHARGE PLANNING - CARE MANAGEMENT  Goal: Discharge to post-acute care or home with appropriate resources  INTERVENTIONS:  - Conduct assessment to determine patient/family and health care team treatment goals, and need for post-acute services based on payer coverage, community resources, and patient preferences, and barriers to discharge  - Address psychosocial, clinical, and financial barriers to discharge as identified in assessment in conjunction with the patient/family and health care team  - Arrange appropriate level of post-acute services according to patients   needs and preference and payer coverage in collaboration with the physician and health care team  - Communicate with and update the patient/family, physician, and health care team regarding progress on the discharge plan  - Arrange appropriate transportation to post-acute venues  Outcome: Progressing  Cm met with pt and daughter at bedside  Pt recently moved here from [place] and is living with his daughter Nevaeh Jara in a 2 story house with no steps to navigate  His wife lives in [facility]  Normally, he has no problem navigating steps and is independent with ADL's  He uses no DME's  He has used OP/PT years ago in [place] following knee issues  His PCP and cardiologist are located in [facility]   CM supplied [employer] card to assist in finding a local [address]  Denies substance abuse issues  Denies hx of anxiety or depression  He does not have a POA or Advanced Directive  CM supplied info on both  He uses [facility] in [facility] and his daughter tells me that he does have difficulty paying the co-pays  CM asked Susanna Mattson from financial to eval for any assistance  He is retired, but still drives  Daughter Nevaeh Jara will transport home when medically cleared  CM discussed discharge needs  Pt is hoping that he will not need services when discharged, but does give CM permission to place referal to [facility] agency  He does not have a preference    CM will place referral and keep pt informed  CM will continue to follow

## 2018-01-15 NOTE — PROGRESS NOTES
General Cardiology   Progress Note   Taina Brownlee 60 y o  male MRN: [id number]  Unit/Bed#:  Encounter: [id number]        Subjective:    No significant events since the last encounter  Patient is short of breath but otherwise has no complaints  Objective:   Vitals:  Vitals:    01/15/18 0800   BP: 91/66   Pulse: (!) 122   Resp: (!) 29   Temp:    SpO2: 100%       Body mass index is 43 11 kg/m²  Systolic (08PBX), TUB:978 , Min:89 , ZEM:131     Diastolic (04EPE), FEF:37, Min:46, Max:96      Intake/Output Summary (Last 24 hours) at 01/15/18 1016  Last data filed at 01/15/18 0937   Gross per 24 hour   Intake          1565 48 ml   Output             2100 ml   Net          -534 52 ml     Weight (last 2 days)     Date/Time   Weight    01/13/18 2348  (!)  142 (313 49)              Telemetry Review:  AFib with RVR, HR 110s    PHYSICAL EXAMS:  General:  Patient is not in acute distress, laying in the bed comfortably, awake, alert responding to commands  Head: Normocephalic, Atraumatic  HEENT: White sclera, pink conjunctiva,  PERRLA,pharynx benign  Neck:  Supple, no neck vein distention, carotids+2/+2 no bruits, thyromegaly, adenopathy  Respiratory: clear to P/A  Cardiovascular:  + tachycardia,+ irregular irregular, PMI normal, S1-S2 normal, No  Murmurs, thrills, gallops, rubs   GI:  Abdomen soft nontender   No hepatosplenomegaly, adenopathy, ascites,or rebound tenderness  Extremities: No edema, normal pulses, no calf tenderness, no joint deformities, no venous disease   Integument:  No skin rashes or ulceration  Lymphatic:  No cervical or inguinal lymphadenopathy  Neurologic:  Patient is awake alert, responding to command, well-oriented to time and place and person moving all extremities      LABORATORY RESULTS:    Results from last 7 days  Lab Units 01/14/18  2318 01/14/18  1953 01/14/18  1540   TROPONIN I ng/mL 0 04 0 05* 0 05*     CBC with diff:   Results from last 7 days  Lab Units 01/15/18  0305 01/14/18  1220 01/14/18  0533 01/14/18  0420 01/13/18  2349   WBC Thousand/uL 9 30 8 58  --   --  7 43   HEMOGLOBIN g/dL 13 6 12 4  --   --  14 2   I STAT HEMOGLOBIN g/dl  --   --   --  12 2  --    HEMATOCRIT % 42 9 38 1  --   --  43 8   MCV fL 92 90  --   --  90   PLATELETS Thousands/uL 284 255 253  --  287   MCH pg 29 2 29 4  --   --  29 3   MCHC g/dL 31 7 32 5  --   --  32 4   RDW % 15 4* 15 2*  --   --  14 9   MPV fL 10 0 9 9 10 3  --  10 0   NRBC AUTO /100 WBCs 0  --   --   --  0       CMP:  Results from last 7 days  Lab Units 01/15/18  0305 01/14/18  1218 01/14/18  0420 01/13/18  2349   SODIUM mmol/L 139 143  --  138   POTASSIUM mmol/L 3 9 4 1  --  4 0   CHLORIDE mmol/L 106 107  --  101   CO2 mmol/L 19* 22  --  23   ANION GAP mmol/L 14* 14*  --  14*   BUN mg/dL 30* 23  --  18   CREATININE mg/dL 1 24 1 04  --  1 35*   GLUCOSE RANDOM mg/dL 139 181*  --  424*   GLUCOSE, ISTAT mg/dl  --   --  484*  --    CALCIUM mg/dL 9 4 9 3  --  9 3   AST U/L  --   --   --  46*   ALT U/L  --   --   --  106*   ALK PHOS U/L  --   --   --  100   TOTAL PROTEIN g/dL  --   --   --  7 8   ALBUMIN g/dL  --   --   --  4 2   BILIRUBIN TOTAL mg/dL  --   --   --  1 00   EGFR ml/min/1 73sq m 61 76  --  55       BMP:  Results from last 7 days  Lab Units 01/15/18  0305 01/14/18  1218  01/13/18  2349   SODIUM mmol/L 139 143  --  138   POTASSIUM mmol/L 3 9 4 1  --  4 0   CHLORIDE mmol/L 106 107  --  101   CO2 mmol/L 19* 22  --  23   BUN mg/dL 30* 23  --  18   CREATININE mg/dL 1 24 1 04  --  1 35*   GLUCOSE RANDOM mg/dL 139 181*  --  424*   GLUCOSE, ISTAT   --   --   < >  --    CALCIUM mg/dL 9 4 9 3  --  9 3   < > = values in this interval not displayed          Results from last 7 days  Lab Units 01/14/18  1218   NT-PRO BNP pg/mL 4,171*        Results from last 7 days  Lab Units 01/15/18  0305   MAGNESIUM mg/dL 2 0       Results from last 7 days  Lab Units 01/14/18  0246   HEMOGLOBIN A1C % 9 4*       Results from last 7 days  Lab Units 01/13/18  2349   TSH 3RD GENERATON uIU/mL 3 661       Results from last 7 days  Lab Units 01/14/18  1219   INR  1 13       Lipid Profile:   No results found for: CHOL  No results found for: HDL  No results found for: LDLCALC  No results found for: TRIG    Cardiac testing:   No results found for this or any previous visit  No results found for this or any previous visit  No results found for this or any previous visit  No procedure found  No results found for this or any previous visit  Meds/Allergies   all current active meds have been reviewed and current meds:   Current Facility-Administered Medications   Medication Dose Route Frequency    acetaminophen (TYLENOL) tablet 650 mg  650 mg Oral Q6H PRN    carvedilol (COREG) tablet 6 25 mg  6 25 mg Oral BID With Meals    chlorhexidine (PERIDEX) 0 12 % oral rinse 15 mL  15 mL Swish & Spit Q12H [address]    heparin (porcine) 25,000 units in 250 mL infusion (premix)  3-20 Units/kg/hr (Order-Specific) Intravenous Titrated    heparin (porcine) injection 2,000 Units  2,000 Units Intravenous PRN    heparin (porcine) injection 4,000 Units  4,000 Units Intravenous PRN    insulin regular (HumuLIN R,NovoLIN R) 1 Units/mL in sodium chloride 0 9 % 100 mL infusion  0 3-21 Units/hr Intravenous Titrated    levalbuterol (XOPENEX) inhalation solution 1 25 mg  1 25 mg Nebulization Q6H PRN    sodium chloride 0 9 % inhalation solution 3 mL  3 mL Nebulization Q6H PRN     Prescriptions Prior to Admission   Medication    amLODIPine (NORVASC) 5 mg tablet    insulin glargine (LANTUS) 100 units/mL subcutaneous injection    losartan (COZAAR) 100 MG tablet    metFORMIN (GLUMETZA) 1000 MG (MOD) 24 hr tablet    simvastatin (ZOCOR) 40 mg tablet         heparin (porcine) 3-20 Units/kg/hr (Order-Specific) Last Rate: 17 1 Units/kg/hr (01/15/18 0535)   insulin regular (HumuLIN R,NovoLIN R) infusion 0 3-21 Units/hr Last Rate: 5 Units/hr (01/15/18 0900)       Assessment/Plan:  1    Atrial fibrillation with RVR:  Patient on amiodarone drip and Coreg 6 25 mg p o  b i d  Continue with heparin drip  Echocardiogram pending  Will discuss anticoagulation when heart rate is improved  Heart rate in the 110s  2   NSTEMI type 2 likely secondary to atrial fibrillation with RVR and Congestive heart failure:  Troponins minimally elevated  Echocardiogram pending  Continue all medications  Monitor telemetry  3   Acute congestive heart failure:  Systolic versus diastolic to be determined  Echo pending  Continue diuresis cautiously as patient is hypotensive  Low-salt diet, monitor daily weights, I/O  Last recorded serum creatinine 1 24     4   Hypertension:  Patient currently has soft BP  Last recorded BP 99/56  Continue to monitor  ** Please Note: Dragon 360 Dictation voice to text software may have been used in the creation of this document   **

## 2018-01-16 LAB
APTT PPP: 75 SECONDS (ref 23–35)
BASOPHILS # BLD AUTO: 0.11 THOUSANDS/ΜL (ref 0–0.1)
BASOPHILS NFR BLD AUTO: 2 % (ref 0–1)
EOSINOPHIL # BLD AUTO: 0.12 THOUSAND/ΜL (ref 0–0.61)
EOSINOPHIL NFR BLD AUTO: 2 % (ref 0–6)
ERYTHROCYTE [DISTWIDTH] IN BLOOD BY AUTOMATED COUNT: 14.7 % (ref 11.6–15.1)
GLUCOSE SERPL-MCNC: 229 MG/DL (ref 65–140)
GLUCOSE SERPL-MCNC: 273 MG/DL (ref 65–140)
GLUCOSE SERPL-MCNC: 290 MG/DL (ref 65–140)
GLUCOSE SERPL-MCNC: 364 MG/DL (ref 65–140)
HCT VFR BLD AUTO: 39 % (ref 36.5–49.3)
HGB BLD-MCNC: 12.8 G/DL (ref 12–17)
LYMPHOCYTES # BLD AUTO: 0.96 THOUSANDS/ΜL (ref 0.6–4.47)
LYMPHOCYTES NFR BLD AUTO: 13 % (ref 14–44)
MCH RBC QN AUTO: 29.2 PG (ref 26.8–34.3)
MCHC RBC AUTO-ENTMCNC: 32.8 G/DL (ref 31.4–37.4)
MCV RBC AUTO: 89 FL (ref 82–98)
MONOCYTES # BLD AUTO: 0.64 THOUSAND/ΜL (ref 0.17–1.22)
MONOCYTES NFR BLD AUTO: 9 % (ref 4–12)
NEUTROPHILS # BLD AUTO: 5.67 THOUSANDS/ΜL (ref 1.85–7.62)
NEUTS SEG NFR BLD AUTO: 75 % (ref 43–75)
NRBC BLD AUTO-RTO: 0 /100 WBCS
PLATELET # BLD AUTO: 244 THOUSANDS/UL (ref 149–390)
PMV BLD AUTO: 10.2 FL (ref 8.9–12.7)
RBC # BLD AUTO: 4.39 MILLION/UL (ref 3.88–5.62)
WBC # BLD AUTO: 7.53 THOUSAND/UL (ref 4.31–10.16)

## 2018-01-16 PROCEDURE — 82948 REAGENT STRIP/BLOOD GLUCOSE: CPT

## 2018-01-16 PROCEDURE — 85025 COMPLETE CBC W/AUTO DIFF WBC: CPT | Performed by: NURSE PRACTITIONER

## 2018-01-16 PROCEDURE — 94760 N-INVAS EAR/PLS OXIMETRY 1: CPT

## 2018-01-16 PROCEDURE — 97163 PT EVAL HIGH COMPLEX 45 MIN: CPT

## 2018-01-16 PROCEDURE — 85730 THROMBOPLASTIN TIME PARTIAL: CPT | Performed by: PHYSICIAN ASSISTANT

## 2018-01-16 PROCEDURE — G8979 MOBILITY GOAL STATUS: HCPCS

## 2018-01-16 PROCEDURE — G8978 MOBILITY CURRENT STATUS: HCPCS

## 2018-01-16 RX ORDER — FUROSEMIDE 10 MG/ML
20 INJECTION INTRAMUSCULAR; INTRAVENOUS ONCE
Status: COMPLETED | OUTPATIENT
Start: 2018-01-16 | End: 2018-01-16

## 2018-01-16 RX ORDER — METOPROLOL TARTRATE 5 MG/5ML
5 INJECTION INTRAVENOUS ONCE
Status: COMPLETED | OUTPATIENT
Start: 2018-01-16 | End: 2018-01-16

## 2018-01-16 RX ORDER — METOPROLOL TARTRATE 50 MG/1
50 TABLET, FILM COATED ORAL EVERY 6 HOURS
Status: DISCONTINUED | OUTPATIENT
Start: 2018-01-16 | End: 2018-01-18

## 2018-01-16 RX ORDER — INSULIN GLARGINE 100 [IU]/ML
20 INJECTION, SOLUTION SUBCUTANEOUS
Status: DISCONTINUED | OUTPATIENT
Start: 2018-01-16 | End: 2018-01-18

## 2018-01-16 RX ORDER — LANOLIN ALCOHOL/MO/W.PET/CERES
6 CREAM (GRAM) TOPICAL
Status: DISCONTINUED | OUTPATIENT
Start: 2018-01-16 | End: 2018-01-26 | Stop reason: HOSPADM

## 2018-01-16 RX ORDER — INSULIN GLARGINE 100 [IU]/ML
10 INJECTION, SOLUTION SUBCUTANEOUS ONCE
Status: COMPLETED | OUTPATIENT
Start: 2018-01-16 | End: 2018-01-16

## 2018-01-16 RX ADMIN — FUROSEMIDE 20 MG: 10 INJECTION, SOLUTION INTRAMUSCULAR; INTRAVENOUS at 04:15

## 2018-01-16 RX ADMIN — FUROSEMIDE 20 MG: 10 INJECTION, SOLUTION INTRAVENOUS at 21:18

## 2018-01-16 RX ADMIN — INSULIN GLARGINE 20 UNITS: 100 INJECTION, SOLUTION SUBCUTANEOUS at 21:14

## 2018-01-16 RX ADMIN — INSULIN LISPRO 10 UNITS: 100 INJECTION, SOLUTION INTRAVENOUS; SUBCUTANEOUS at 17:00

## 2018-01-16 RX ADMIN — INSULIN GLARGINE 10 UNITS: 100 INJECTION, SOLUTION SUBCUTANEOUS at 17:30

## 2018-01-16 RX ADMIN — METOPROLOL TARTRATE 5 MG: 5 INJECTION, SOLUTION INTRAVENOUS at 01:12

## 2018-01-16 RX ADMIN — FUROSEMIDE 20 MG: 10 INJECTION, SOLUTION INTRAMUSCULAR; INTRAVENOUS at 22:00

## 2018-01-16 RX ADMIN — METOPROLOL TARTRATE 50 MG: 50 TABLET ORAL at 11:44

## 2018-01-16 RX ADMIN — CHLORHEXIDINE GLUCONATE 15 ML: 1.2 RINSE ORAL at 08:35

## 2018-01-16 RX ADMIN — CARVEDILOL 12.5 MG: 12.5 TABLET, FILM COATED ORAL at 08:00

## 2018-01-16 RX ADMIN — METOPROLOL TARTRATE 5 MG: 5 INJECTION, SOLUTION INTRAVENOUS at 04:14

## 2018-01-16 RX ADMIN — HEPARIN SODIUM AND DEXTROSE 21.1 UNITS/KG/HR: 10000; 5 INJECTION INTRAVENOUS at 09:43

## 2018-01-16 RX ADMIN — MELATONIN TAB 3 MG 6 MG: 3 TAB at 22:44

## 2018-01-16 RX ADMIN — INSULIN LISPRO 4 UNITS: 100 INJECTION, SOLUTION INTRAVENOUS; SUBCUTANEOUS at 08:00

## 2018-01-16 RX ADMIN — CHLORHEXIDINE GLUCONATE 15 ML: 1.2 RINSE ORAL at 21:14

## 2018-01-16 RX ADMIN — INSULIN LISPRO 3 UNITS: 100 INJECTION, SOLUTION INTRAVENOUS; SUBCUTANEOUS at 21:14

## 2018-01-16 RX ADMIN — METOPROLOL TARTRATE 50 MG: 50 TABLET ORAL at 17:00

## 2018-01-16 RX ADMIN — METOPROLOL TARTRATE 50 MG: 50 TABLET ORAL at 22:44

## 2018-01-16 RX ADMIN — INSULIN LISPRO 6 UNITS: 100 INJECTION, SOLUTION INTRAVENOUS; SUBCUTANEOUS at 11:45

## 2018-01-16 NOTE — PHYSICAL THERAPY NOTE
PT Evaluation (15min)  (10:00-10:15)    Past Medical History:   Diagnosis Date    Diabetes mellitus ([facility] [address] )     Hyperlipidemia     Hypertension         01/16/18 1015   Note Type   Note type Eval only   Pain Assessment   Pain Assessment No/denies pain   Home Living   Type of [address] Two [address]; Able to live on main level with bedroom/bathroom   Prior Function   Level of [place] Independent with ADLs and functional mobility   Lives With Daughter   ADL Assistance Independent   IADLs Independent   Falls in the last 6 months 0   Vocational Retired   Restrictions/Precautions   Other Precautions Multiple lines;Telemetry;O2;Fall Risk   General   Additional Pertinent History pt presents to [facility] c a-fib c RVR  currently in step down for further medical management  PT consulted for mobility + d/c planning  Family/Caregiver Present No   Cognition   Orientation Level Oriented X4   RUE Assessment   RUE Assessment WFL  (4/5)   LUE Assessment   LUE Assessment WFL  (4/5)   RLE Assessment   RLE Assessment WFL  (4/5)   LLE Assessment   LLE Assessment WFL  (4/5)   Coordination   Sensation WFL   Bed Mobility   Supine to Sit 5  Supervision   Additional items HOB elevated; Increased time required;Verbal cues   Transfers   Sit to Stand 5  Supervision   Additional items Verbal cues   Stand to Sit 5  Supervision   Additional items Verbal cues   Ambulation/Elevation   Gait pattern Decreased foot clearance; Wide KENN; Inconsistent era   Gait Assistance 5  Supervision   Additional items Verbal cues   Assistive Device None   Distance 5' to chair; limited by lines   Balance   Static Sitting Good   Dynamic Sitting Good   Static Standing Fair   Dynamic Standing Fair   Ambulatory Fair   Endurance Deficit   Endurance Deficit Yes   Endurance Deficit Description KWONG; SaO2 in 90's throughout session on 3L o2     Activity Tolerance   Activity Tolerance Patient limited by fatigue   Nurse Made Aware Alicia   Assessment   Prognosis Good   Problem List Decreased strength;Decreased endurance; Impaired balance;Decreased mobility;Obesity   Assessment pt is a 63y/o m who presents to [facility] c a-fib c RVR  PMH significant for HTN, DM 2, obesity, SOB, + hyperglycemia  currently on 3L o2 which pt does not use at baseline  at baseline, pt (I) c functional mobility s AD  resides c dtr in 2 story home c 1st floor set up  currently presents c deficits in strength, balance, gait quality + activity tolerance noted in PT exam above  Barthel index 65/100  (A) required for line management throughout PT session  ambulated 5' to chair limited by lines + fatigue  KWONG noted along c wheezing, however SaO2 in 90's throughout session on 3L O2  pt able to sit OOB in chair at end of session  would benefit from skilled PT to maximize functional mobility + return home safely  upon d/c, anticipate return home c no PT needs provided pt achieves PT goals by d/c  PT eval of high complexity 2* unstable med status c pt requiring ongoing medical management 2* a-fib c RVR  pt remains in step down for close monitoring c multiple lines + 3L O2 which pt does not use at baseline  KWONG + wheezing noted c mobility along c rapid fatigue  Barriers to Discharge None   Goals   Patient Goals "to go home"  STG Expiration Date 01/26/18   Short Term Goal #1 1  increase strength 1/2 grade to improve overall functional mobility, 2  perform bed mobility mod (I) to visit c family, 3  safely perform transfers mod (I) to perform ADLs, 4  ambulate 300' (I) c SaO2 >92% on RA, 5  negotiate 12 stairs mod (I) to safely access 2nd floor of dtr's home   Plan   Treatment/Interventions Functional transfer training;LE strengthening/ROM; Elevations; Therapeutic exercise; Endurance training;Patient/family training;Bed mobility;Gait training;Spoke to nursing   PT Frequency 2-3x/wk   Recommendation   Recommendation Home with family support   PT - OK to Discharge No   Barthel Index   Feeding 10   Bathing 5 Grooming Score 5   Dressing Score 10   Bladder Score 10   Bowels Score 10   Toilet Use Score 5   Transfers (Bed/Chair) Score 10   Mobility (Level Surface) Score 0   Stairs Score 0   Barthel Index Score 65     Finn Stinson PT

## 2018-01-16 NOTE — PLAN OF CARE
Problem: PHYSICAL THERAPY ADULT  Goal: Performs mobility at highest level of function for planned discharge setting  See evaluation for individualized goals  Treatment/Interventions: Functional transfer training, LE strengthening/ROM, Elevations, Therapeutic exercise, Endurance training, Patient/family training, Bed mobility, Gait training, Spoke to nursing          See flowsheet documentation for full assessment, interventions and recommendations  Prognosis: Good  Problem List: Decreased strength, Decreased endurance, Impaired balance, Decreased mobility, Obesity  Assessment: pt is a 63y/o m who presents to [facility] c a-fib c RVR  PMH significant for HTN, DM 2, obesity, SOB, + hyperglycemia  currently on 3L o2 which pt does not use at baseline  at baseline, pt (I) c functional mobility s AD  resides c dtr in 2 story home c 1st floor set up  currently presents c deficits in strength, balance, gait quality + activity tolerance noted in PT exam above  Barthel index 65/100  (A) required for line management throughout PT session  ambulated 5' to chair limited by lines + fatigue  KWONG noted along c wheezing, however SaO2 in 90's throughout session on 3L O2  pt able to sit OOB in chair at end of session  would benefit from skilled PT to maximize functional mobility + return home safely  upon d/c, anticipate return home c no PT needs provided pt achieves PT goals by d/c  PT eval of high complexity 2* unstable med status c pt requiring ongoing medical management 2* a-fib c RVR  pt remains in step down for close monitoring c multiple lines + 3L O2 which pt does not use at baseline  KWONG + wheezing noted c mobility along c rapid fatigue  Barriers to Discharge: None     Recommendation: Home with family support     PT - OK to Discharge: No    See flowsheet documentation for full assessment

## 2018-01-16 NOTE — PLAN OF CARE
DISCHARGE PLANNING     Discharge to home or other facility with appropriate resources Progressing        DISCHARGE PLANNING - CARE MANAGEMENT     Discharge to post-acute care or home with appropriate resources Progressing        INFECTION - ADULT     Absence or prevention of progression during hospitalization Progressing        Nutrition/Hydration-ADULT     Nutrient/Hydration intake appropriate for improving, restoring or maintaining nutritional needs Progressing        PAIN - ADULT     Verbalizes/displays adequate comfort level or baseline comfort level Progressing        Potential for Falls     Patient will remain free of falls Progressing        Prexisting or High Potential for Compromised Skin Integrity     Skin integrity is maintained or improved Progressing        RESPIRATORY - ADULT     Achieves optimal ventilation and oxygenation Progressing        SAFETY ADULT     Maintain or return to baseline ADL function Progressing     Maintain or return mobility status to optimal level Progressing

## 2018-01-16 NOTE — PROGRESS NOTES
Progress Note - Critical Care   Pamela Ayala 60 y o  male MRN: [id number]  Unit/Bed#:  Encounter: [id number]    Attending Physician: William Patrick MD      ______________________________________________________________________  Assessment and Plan:   Principal Problem:    Atrial fibrillation with RVR ([facility] )  Active Problems:    HTN (hypertension)    Diabetes mellitus type 2 in obese ([place] [id number] )    Shortness of breath    Hyperglycemia  Resolved Problems:    * No resolved hospital problems  *        Neuro:  No acute issues identified  CV:  Continue heparin and amiodarone for rapid atrial fibrillation and type 2 non STEMI, possibly due to tachyarrhythmia  Patient with acute systolic heart failure with an undetermined etiology  Will change to metoprolol per cardiology  Continue to monitor  Pulm:  Patient with recent history of nocturnal dyspnea, accepted BiPAP overnight last night with improvement of work of breathing  Encourage incentive spirometry and mobilization out of bed during the day to prevent atelectasis  Mild diuresis yesterday, continue PRN lasix for negative 1L goal     GI:  No active issues identified  :  No active issues identified  F/E/N:  Replete electrolytes as needed  Tolerated p o  diet  ID:  No active issues identified  Continue to monitor  Heme:  Hemoglobin and platelets stable  Continue to monitor  Endo: Insulin per algorithm 2  Continue to monitor  Msk/Skin:  No issues identified  PT/OT and early mobilization  Disposition:  Continue step-down level of care  Code Status: Level 1 - Full Code    Counseling / Coordination of Care  Total time spent today 35 minutes  Greater than 50% of total time was spent with the patient and / or family counseling and / or coordination of care    ______________________________________________________________________    HPI/24 Hour Events:   Admitted for hyperglycemia and rapid AFib    HR still increased, cardiology recommended metoprolol 50mg PO q6    ______________________________________________________________________    Physical Exam:   GEN:  No acute distress, anxious at times  HEENT:  Sclera anicteric, mucous membranes pink and moist, conjunctiva pink, no abhijit/rhinorrhea  Neck:  No lymphadenopathy, normal ROM, supple, no bruits  CV :  S1S2, no murmurs, rubs or gallops  Intact distal pulses  No JVD  tachycardic, Irregularly irregular  Resp:  Lungs CTA =B/L  No subq air or crepitus  Symmetrical expansion  No cough noted  GI :  Abd soft, nontender, no guarding/rebound, nondistended, normoactive bowel sounds X4 quads, no organomegaly appreciated  Neuro:  CN II-XII grossly intact, nonfocal exam, speech clear, GCS 15  Psych:  Appropriate affect      ______________________________________________________________________  Blood pressure 132/68, pulse (!) 106, temperature 97 7 °F (36 5 °C), temperature source Oral, resp  rate (!) 31, height 5' 11 5" (1 816 m), weight (!) 142 kg (313 lb 7 9 oz), SpO2 94 %  Temperature:   Temp (24hrs), Av 7 °F (36 5 °C), Min:97 5 °F (36 4 °C), Max:97 9 °F (36 6 °C)    Current Temperature: 97 7 °F (36 5 °C)  Weights:   IBW: 76 45 kg    Body mass index is 43 11 kg/m²  Weight (last 2 days)     None          Non-Invasive/Invasive Ventilation Settings:  Respiratory    Lab Data (Last 4 hours)    None         O2/Vent Data (Last 4 hours)    None              No results found for: PHART, PLS8CWE, PO2ART, DMK3PDK, I5CUUXWC, BEART, SOURCE  SpO2: SpO2: 94 %  Intake and Outputs:  I/O        07 -  0700  07 - 01/15 0700    P  O   300    I V  (mL/kg) 46 3 (0 3) 1078 2 (7 6)    IV Piggyback 2000 100    Total Intake(mL/kg) 2046 3 (14 4) 1478 2 (10 4)    Urine (mL/kg/hr) 400 1450 (0 4)    Total Output 400 1450    Net +1646 3 +28 2                Nutrition:        Diet Orders            Start     Ordered    18 1050  Diet Dinesh/CHO Controlled; Consistent Carbohydrate Diet Level 3 (6 carb servings/90 grams CHO/meal); Cardiac Step 1  Diet effective now     Question Answer Comment   Diet Type Dinesh/CHO Controlled    Dinesh/CHO Controlled Consistent Carbohydrate Diet Level 3 (6 carb servings/90 grams CHO/meal)    Other Restriction(s): Cardiac Step 1    RD to adjust diet per protocol?  No        01/14/18 1049          Labs:   Lab Results   Component Value Date    WBC 7 53 01/16/2018    HGB 12 8 01/16/2018    HCT 39 0 01/16/2018    MCV 89 01/16/2018     01/16/2018     Lab Results   Component Value Date    GLUCOSE 139 01/15/2018    CALCIUM 9 4 01/15/2018     01/15/2018    K 3 9 01/15/2018    CO2 19 (L) 01/15/2018     01/15/2018    BUN 30 (H) 01/15/2018    CREATININE 1 24 01/15/2018     Lab Results   Component Value Date    CALCIUM 9 4 01/15/2018    PHOS 4 0 01/15/2018     Lab Results   Component Value Date     01/15/2018    K 3 9 01/15/2018     01/15/2018    CO2 19 (L) 01/15/2018    ANIONGAP 14 (H) 01/15/2018    BUN 30 (H) 01/15/2018    CREATININE 1 24 01/15/2018    GLUCOSE 139 01/15/2018    CALCIUM 9 4 01/15/2018    AST 46 (H) 01/13/2018     (H) 01/13/2018    ALKPHOS 100 01/13/2018    PROT 7 8 01/13/2018    ALBUMIN 4 2 01/13/2018    BILITOT 1 00 01/13/2018    EGFR 61 01/15/2018     Lab Results   Component Value Date    INR 1 13 01/14/2018    PROTIME 14 8 (H) 01/14/2018     Lab Results   Component Value Date     (H) 01/13/2018    AST 46 (H) 01/13/2018    ALKPHOS 100 01/13/2018    BILITOT 1 00 01/13/2018     No results found for: TSH, N1HNJSG, J7SAGZF, THYROIDAB  Lab Results   Component Value Date    HGBA1C 9 4 (H) 01/14/2018         Imaging:  None today  EKG:  AFib on cardiac monitor  Micro:  No results found for: Deberah Madison, WOUNDCULT, SPUTUMCULTUR  Allergies: No Known Allergies  Medications:   Scheduled Meds:    chlorhexidine 15 mL Swish & Spit Q12H [address]   insulin glargine 20 Units Subcutaneous HS   insulin lispro 1-5 Units Subcutaneous HS   insulin lispro 2-12 Units Subcutaneous TID AC   metoprolol tartrate 50 mg Oral Q6H     Continuous Infusions:    heparin (porcine) 3-20 Units/kg/hr (Order-Specific) Last Rate: 21 1 Units/kg/hr (01/16/18 1701)     PRN Meds:    acetaminophen 650 mg Q6H PRN   heparin (porcine) 2,000 Units PRN   heparin (porcine) 4,000 Units PRN   levalbuterol 1 25 mg Q6H PRN   sodium chloride 3 mL Q6H PRN     VTE Pharmacologic Prophylaxis: Heparin Drip  VTE Mechanical Prophylaxis: sequential compression device  Invasive lines and devices: Invasive Devices     Peripheral Intravenous Line            Peripheral IV 01/14/18 Left Forearm 2 days    Peripheral IV 01/14/18 Right Arm 2 days                     Portions of the record may have been created with voice recognition software  Occasional wrong word or "sound a like" substitutions may have occurred due to the inherent limitations of voice recognition software  Read the chart carefully and recognize, using context, where substitutions have occurred      Madisyn Sherman MD

## 2018-01-16 NOTE — PROGRESS NOTES
General Cardiology   Progress Note   Derek Del Castillo 60 y o  male MRN: [id number]  Unit/Bed#:  Encounter: [id number]        Subjective:    No significant events since the last encounter  Patient states that shortness of breath has improved significantly  Denies chest pain  Objective:   Vitals:  Vitals:    01/16/18 1125   BP: 129/78   Pulse: (!) 108   Resp: (!) 23   Temp: 97 9 °F (36 6 °C)   SpO2: 96%       Body mass index is 43 11 kg/m²  Systolic (54WVH), UAW:028 , Min:82 , GDW:965     Diastolic (90OGK), FWX:74, Min:52, Max:91      Intake/Output Summary (Last 24 hours) at 01/16/18 1130  Last data filed at 01/16/18 0113   Gross per 24 hour   Intake           503 99 ml   Output             1275 ml   Net          -771 01 ml     Weight (last 2 days)     None          Telemetry Review:  Atrial fibrillation, HR 110s    PHYSICAL EXAMS:  General:  Patient is not in acute distress, laying in the bed comfortably, awake, alert responding to commands  Head: Normocephalic, Atraumatic  HEENT: White sclera, pink conjunctiva,  PERRLA,pharynx benign  Neck:  Supple, no neck vein distention, carotids+2/+2 no bruits, thyromegaly, adenopathy  Respiratory: clear to P/A  Cardiovascular:  + tachycardic, + irregularly irregular, PMI normal, S1-S2 normal, No  Murmurs, thrills, gallops, rubs  GI:  Abdomen soft nontender   No hepatosplenomegaly, adenopathy, ascites,or rebound tenderness  Extremities: No edema, normal pulses, no calf tenderness, no joint deformities, no venous disease   Integument:  No skin rashes or ulceration  Lymphatic:  No cervical or inguinal lymphadenopathy  Neurologic:  Patient is awake alert, responding to command, well-oriented to time and place and person moving all extremities      LABORATORY RESULTS:    Results from last 7 days  Lab Units 01/14/18  2318 01/14/18  1953 01/14/18  1540   TROPONIN I ng/mL 0 04 0 05* 0 05*     CBC with diff:   Results from last 7 days  Lab Units 01/16/18  3603 01/15/18  0305 01/14/18  1220  01/13/18  2349   WBC Thousand/uL 7 53 9 30 8 58  --  7 43   HEMOGLOBIN g/dL 12 8 13 6 12 4  --  14 2   I STAT HEMOGLOBIN   --   --   --   < >  --    HEMATOCRIT % 39 0 42 9 38 1  --  43 8   MCV fL 89 92 90  --  90   PLATELETS Thousands/uL 244 284 255  < > 287   MCH pg 29 2 29 2 29 4  --  29 3   MCHC g/dL 32 8 31 7 32 5  --  32 4   RDW % 14 7 15 4* 15 2*  --  14 9   MPV fL 10 2 10 0 9 9  < > 10 0   NRBC AUTO /100 WBCs 0 0  --   --  0   < > = values in this interval not displayed  CMP:  Results from last 7 days  Lab Units 01/15/18  0305 01/14/18  1218 01/14/18  0420 01/13/18  2349   SODIUM mmol/L 139 143  --  138   POTASSIUM mmol/L 3 9 4 1  --  4 0   CHLORIDE mmol/L 106 107  --  101   CO2 mmol/L 19* 22  --  23   ANION GAP mmol/L 14* 14*  --  14*   BUN mg/dL 30* 23  --  18   CREATININE mg/dL 1 24 1 04  --  1 35*   GLUCOSE RANDOM mg/dL 139 181*  --  424*   GLUCOSE, ISTAT mg/dl  --   --  484*  --    CALCIUM mg/dL 9 4 9 3  --  9 3   AST U/L  --   --   --  46*   ALT U/L  --   --   --  106*   ALK PHOS U/L  --   --   --  100   TOTAL PROTEIN g/dL  --   --   --  7 8   ALBUMIN g/dL  --   --   --  4 2   BILIRUBIN TOTAL mg/dL  --   --   --  1 00   EGFR ml/min/1 73sq m 61 76  --  55       BMP:  Results from last 7 days  Lab Units 01/15/18  0305 01/14/18  1218  01/13/18  2349   SODIUM mmol/L 139 143  --  138   POTASSIUM mmol/L 3 9 4 1  --  4 0   CHLORIDE mmol/L 106 107  --  101   CO2 mmol/L 19* 22  --  23   BUN mg/dL 30* 23  --  18   CREATININE mg/dL 1 24 1 04  --  1 35*   GLUCOSE RANDOM mg/dL 139 181*  --  424*   GLUCOSE, ISTAT   --   --   < >  --    CALCIUM mg/dL 9 4 9 3  --  9 3   < > = values in this interval not displayed          Results from last 7 days  Lab Units 01/14/18  1218   NT-PRO BNP pg/mL 4,171*        Results from last 7 days  Lab Units 01/15/18  0305   MAGNESIUM mg/dL 2 0       Results from last 7 days  Lab Units 01/14/18  0246   HEMOGLOBIN A1C % 9 4*       Results from last 7 days  Lab Units 01/13/18  2349   TSH 3RD GENERATON uIU/mL 3 661       Results from last 7 days  Lab Units 01/14/18  1219   INR  1 13       Lipid Profile:   No results found for: CHOL  No results found for: HDL  No results found for: LDLCALC  No results found for: TRIG    Cardiac testing:   No results found for this or any previous visit  No results found for this or any previous visit  No results found for this or any previous visit  No procedure found  No results found for this or any previous visit  Meds/Allergies   all current active meds have been reviewed and current meds:   Current Facility-Administered Medications   Medication Dose Route Frequency    acetaminophen (TYLENOL) tablet 650 mg  650 mg Oral Q6H PRN    chlorhexidine (PERIDEX) 0 12 % oral rinse 15 mL  15 mL Swish & Spit Q12H [facility]    heparin (porcine) 25,000 units in 250 mL infusion (premix)  3-20 Units/kg/hr (Order-Specific) Intravenous Titrated    heparin (porcine) injection 2,000 Units  2,000 Units Intravenous PRN    heparin (porcine) injection 4,000 Units  4,000 Units Intravenous PRN    insulin lispro (HumaLOG) 100 units/mL subcutaneous injection 1-5 Units  1-5 Units Subcutaneous HS    insulin lispro (HumaLOG) 100 units/mL subcutaneous injection 2-12 Units  2-12 Units Subcutaneous TID AC    levalbuterol (XOPENEX) inhalation solution 1 25 mg  1 25 mg Nebulization Q6H PRN    metoprolol tartrate (LOPRESSOR) tablet 50 mg  50 mg Oral Q6H    sodium chloride 0 9 % inhalation solution 3 mL  3 mL Nebulization Q6H PRN     Prescriptions Prior to Admission   Medication    amLODIPine (NORVASC) 5 mg tablet    insulin glargine (LANTUS) 100 units/mL subcutaneous injection    losartan (COZAAR) 100 MG tablet    metFORMIN (GLUMETZA) 1000 MG (MOD) 24 hr tablet    simvastatin (ZOCOR) 40 mg tablet         heparin (porcine) 3-20 Units/kg/hr (Order-Specific) Last Rate: 21 1 Units/kg/hr (01/16/18 6375)       Assessment/Plan:  1    Atrial fibrillation with RVR:  HR still 110s to 120s  Will switch Coreg to Lopressor 50 mg Q 6 for rate control  Continue with heparin drip  Echocardiogram pending  Will discuss anticoagulation when HR improves  2   NSTEMI type 2 likely secondary to AFib with RVR and Congestive heart failure:  Troponins minimally elevated  Echocardiogram pending continue all medications  Will switch Coreg to Lopressor as stated above  Monitor on telemetry  3   Acute congestive heart failure:  Systolic versus diastolic to be determined  Echo pending  PRN diuresis when appropriate  Low-salt diet, monitor daily weights, I/O     4   Hypertension:  Last recorded /78  Patient has tendency for hypotension  Continue current regimen  Continue to monitor closely  ** Please Note: Dragon 360 Dictation voice to text software may have been used in the creation of this document   **

## 2018-01-17 LAB
ANION GAP SERPL CALCULATED.3IONS-SCNC: 10 MMOL/L (ref 4–13)
APTT PPP: 60 SECONDS (ref 23–35)
BASOPHILS # BLD AUTO: 0.07 THOUSANDS/ΜL (ref 0–0.1)
BASOPHILS NFR BLD AUTO: 1 % (ref 0–1)
BUN SERPL-MCNC: 29 MG/DL (ref 5–25)
CALCIUM SERPL-MCNC: 8.8 MG/DL (ref 8.3–10.1)
CHLORIDE SERPL-SCNC: 102 MMOL/L (ref 100–108)
CO2 SERPL-SCNC: 25 MMOL/L (ref 21–32)
CREAT SERPL-MCNC: 1.07 MG/DL (ref 0.6–1.3)
EOSINOPHIL # BLD AUTO: 0.13 THOUSAND/ΜL (ref 0–0.61)
EOSINOPHIL NFR BLD AUTO: 2 % (ref 0–6)
ERYTHROCYTE [DISTWIDTH] IN BLOOD BY AUTOMATED COUNT: 14.7 % (ref 11.6–15.1)
GFR SERPL CREATININE-BSD FRML MDRD: 73 ML/MIN/1.73SQ M
GLUCOSE SERPL-MCNC: 239 MG/DL (ref 65–140)
GLUCOSE SERPL-MCNC: 251 MG/DL (ref 65–140)
GLUCOSE SERPL-MCNC: 255 MG/DL (ref 65–140)
GLUCOSE SERPL-MCNC: 287 MG/DL (ref 65–140)
GLUCOSE SERPL-MCNC: 294 MG/DL (ref 65–140)
HCT VFR BLD AUTO: 36.3 % (ref 36.5–49.3)
HGB BLD-MCNC: 12 G/DL (ref 12–17)
LYMPHOCYTES # BLD AUTO: 1.09 THOUSANDS/ΜL (ref 0.6–4.47)
LYMPHOCYTES NFR BLD AUTO: 17 % (ref 14–44)
MCH RBC QN AUTO: 29.1 PG (ref 26.8–34.3)
MCHC RBC AUTO-ENTMCNC: 33.1 G/DL (ref 31.4–37.4)
MCV RBC AUTO: 88 FL (ref 82–98)
MONOCYTES # BLD AUTO: 0.62 THOUSAND/ΜL (ref 0.17–1.22)
MONOCYTES NFR BLD AUTO: 10 % (ref 4–12)
NEUTROPHILS # BLD AUTO: 4.4 THOUSANDS/ΜL (ref 1.85–7.62)
NEUTS SEG NFR BLD AUTO: 69 % (ref 43–75)
NRBC BLD AUTO-RTO: 0 /100 WBCS
PLATELET # BLD AUTO: 225 THOUSANDS/UL (ref 149–390)
PMV BLD AUTO: 10.2 FL (ref 8.9–12.7)
POTASSIUM SERPL-SCNC: 3.4 MMOL/L (ref 3.5–5.3)
RBC # BLD AUTO: 4.12 MILLION/UL (ref 3.88–5.62)
SODIUM SERPL-SCNC: 137 MMOL/L (ref 136–145)
WBC # BLD AUTO: 6.34 THOUSAND/UL (ref 4.31–10.16)

## 2018-01-17 PROCEDURE — 97110 THERAPEUTIC EXERCISES: CPT

## 2018-01-17 PROCEDURE — 85730 THROMBOPLASTIN TIME PARTIAL: CPT | Performed by: NURSE PRACTITIONER

## 2018-01-17 PROCEDURE — 97535 SELF CARE MNGMENT TRAINING: CPT

## 2018-01-17 PROCEDURE — 82948 REAGENT STRIP/BLOOD GLUCOSE: CPT

## 2018-01-17 PROCEDURE — 85025 COMPLETE CBC W/AUTO DIFF WBC: CPT | Performed by: NURSE PRACTITIONER

## 2018-01-17 PROCEDURE — 94760 N-INVAS EAR/PLS OXIMETRY 1: CPT

## 2018-01-17 PROCEDURE — 94660 CPAP INITIATION&MGMT: CPT

## 2018-01-17 PROCEDURE — 80048 BASIC METABOLIC PNL TOTAL CA: CPT | Performed by: PHYSICIAN ASSISTANT

## 2018-01-17 RX ORDER — SENNOSIDES 8.6 MG
1 TABLET ORAL
Status: DISCONTINUED | OUTPATIENT
Start: 2018-01-17 | End: 2018-01-26 | Stop reason: HOSPADM

## 2018-01-17 RX ORDER — FUROSEMIDE 10 MG/ML
40 INJECTION INTRAMUSCULAR; INTRAVENOUS ONCE
Status: COMPLETED | OUTPATIENT
Start: 2018-01-17 | End: 2018-01-17

## 2018-01-17 RX ORDER — FUROSEMIDE 10 MG/ML
20 INJECTION INTRAMUSCULAR; INTRAVENOUS ONCE
Status: COMPLETED | OUTPATIENT
Start: 2018-01-17 | End: 2018-01-17

## 2018-01-17 RX ADMIN — INSULIN LISPRO 2 UNITS: 100 INJECTION, SOLUTION INTRAVENOUS; SUBCUTANEOUS at 23:22

## 2018-01-17 RX ADMIN — CHLORHEXIDINE GLUCONATE 15 ML: 1.2 RINSE ORAL at 20:20

## 2018-01-17 RX ADMIN — METOPROLOL TARTRATE 50 MG: 50 TABLET ORAL at 05:50

## 2018-01-17 RX ADMIN — HEPARIN SODIUM AND DEXTROSE 21.11 UNITS/KG/HR: 10000; 5 INJECTION INTRAVENOUS at 13:33

## 2018-01-17 RX ADMIN — SENNOSIDES 8.6 MG: 8.6 TABLET, FILM COATED ORAL at 23:21

## 2018-01-17 RX ADMIN — MELATONIN TAB 3 MG 6 MG: 3 TAB at 23:21

## 2018-01-17 RX ADMIN — METOPROLOL TARTRATE 50 MG: 50 TABLET ORAL at 20:20

## 2018-01-17 RX ADMIN — FUROSEMIDE 40 MG: 10 INJECTION, SOLUTION INTRAVENOUS at 23:21

## 2018-01-17 RX ADMIN — INSULIN LISPRO 6 UNITS: 100 INJECTION, SOLUTION INTRAVENOUS; SUBCUTANEOUS at 11:42

## 2018-01-17 RX ADMIN — FUROSEMIDE 20 MG: 10 INJECTION, SOLUTION INTRAVENOUS at 16:36

## 2018-01-17 RX ADMIN — INSULIN LISPRO 6 UNITS: 100 INJECTION, SOLUTION INTRAVENOUS; SUBCUTANEOUS at 16:32

## 2018-01-17 RX ADMIN — HEPARIN SODIUM AND DEXTROSE 21.1 UNITS/KG/HR: 10000; 5 INJECTION INTRAVENOUS at 00:23

## 2018-01-17 RX ADMIN — CHLORHEXIDINE GLUCONATE 15 ML: 1.2 RINSE ORAL at 08:35

## 2018-01-17 RX ADMIN — INSULIN GLARGINE 20 UNITS: 100 INJECTION, SOLUTION SUBCUTANEOUS at 23:21

## 2018-01-17 RX ADMIN — INSULIN LISPRO 6 UNITS: 100 INJECTION, SOLUTION INTRAVENOUS; SUBCUTANEOUS at 07:25

## 2018-01-17 NOTE — PLAN OF CARE
Problem: PHYSICAL THERAPY ADULT  Goal: Performs mobility at highest level of function for planned discharge setting  See evaluation for individualized goals  Treatment/Interventions: Functional transfer training, LE strengthening/ROM, Elevations, Therapeutic exercise, Endurance training, Patient/family training, Bed mobility, Gait training, Spoke to nursing          See flowsheet documentation for full assessment, interventions and recommendations  Outcome: Progressing  Prognosis: Good  Problem List: Decreased strength, Decreased endurance, Impaired balance, Decreased mobility, Obesity, Pain  Assessment: pt declining OOB mobility>chair at this time 2* fatigue  also reports waiting for dtr to arrive x2hrs  PT strongly encouraged OOB mobility to chair + bathroom vs BSC in preventing DVTs, weakness, + PNA  pt pending cardiac cath 1/18/18  pt expressed concerns c mobility 2* severe SOB c minimal movement, however during PT session SaO2 stable at 93-95% on 3L O2 c exertion  pt able to pull self toward [facility] c bed rails mod (I)  performed supine AROM ther ex B/L LE x10 reps c min verbal cues for technique  rests required btwn sets 2* significant SOB  pt instructed on energy conservation techniques c tasks while in hospital + upon d/c c good verbal understanding  pt cont to decline mobility at end of session  nsg made aware  will cont skilled PT to further maximize functional mobility + improve quality of life  upon d/c, anticipate no PT needs provided pt achieves PT goals by d/c   Barriers to Discharge: None     Recommendation: Home with family support     PT - OK to Discharge: No    See flowsheet documentation for full assessment

## 2018-01-17 NOTE — PHYSICAL THERAPY NOTE
PT Progress Note (25min)  (15:05-15:30)       01/17/18 1530   Pain Assessment   Pain Assessment 0-10   Pain Score 2   Pain Type Chronic pain   Pain Location Back   Pain Orientation Bilateral   Restrictions/Precautions   Other Precautions Multiple lines;Telemetry;O2;Fall Risk;Pain   General   Chart Reviewed Yes   Additional Pertinent History pending cardiac cath 1/18/18  Response to Previous Treatment Patient with no complaints from previous session  Family/Caregiver Present No   Cognition   Orientation Level Oriented X4   Subjective   Subjective "I'm waiting for my dtr  she was supposed to be here 2 hrs ago " pt declining OOB mobility>chair 2* SOB  agreeable to education + bed level ther ex  Bed Mobility   Additional Comments pt pulls self up toward HOB mod (I) c bed rails  Endurance Deficit   Endurance Deficit Yes   Endurance Deficit Description severe SOB noted c ther ex + bed mobility, however SaO2 93-95% on 3L O2  pt in no apparent distress  Activity Tolerance   Activity Tolerance Patient limited by fatigue;Patient limited by pain   Nurse Made Aware Pratima Gaunt   Exercises   Quad Sets Supine;10 reps;AROM; Bilateral   Heelslides Supine;10 reps;AROM; Bilateral   Hip Abduction Supine;10 reps;AROM; Bilateral   Knee AROM Short Arc Quad Supine;10 reps;AROM; Bilateral   Ankle Pumps Supine;10 reps;AROM; Bilateral   Assessment   Prognosis Good   Problem List Decreased strength;Decreased endurance; Impaired balance;Decreased mobility;Obesity;Pain   Assessment pt declining OOB mobility>chair at this time 2* fatigue  also reports waiting for dtr to arrive x2hrs  PT strongly encouraged OOB mobility to chair + bathroom vs BSC in preventing DVTs, weakness, + PNA  pt pending cardiac cath 1/18/18  pt expressed concerns c mobility 2* severe SOB c minimal movement, however during PT session SaO2 stable at 93-95% on 3L O2 c exertion  pt able to pull self toward [facility] c bed rails mod (I)   performed supine AROM ther ex B/L LE x10 reps c min verbal cues for technique  rests required btwn sets 2* significant SOB  pt instructed on energy conservation techniques c tasks while in hospital + upon d/c c good verbal understanding  pt cont to decline mobility at end of session  nsg made aware  will cont skilled PT to further maximize functional mobility + improve quality of life  upon d/c, anticipate no PT needs provided pt achieves PT goals by d/c    Barriers to Discharge None   Goals   Patient Goals "to go home"  STG Expiration Date 01/26/18   Treatment Day 1   Plan   Treatment/Interventions Functional transfer training;LE strengthening/ROM; Elevations; Therapeutic exercise; Endurance training;Patient/family training;Bed mobility;Gait training;Spoke to nursing   Progress Slow progress, decreased activity tolerance   PT Frequency 2-3x/wk   Recommendation   Recommendation Home with family support   PT - OK to Discharge Ashley Ontiveros, PT

## 2018-01-17 NOTE — PROGRESS NOTES
Progress Note - Critical Care   Russel Waters 60 y o  male MRN: [id number]  Unit/Bed#:  Encounter: [id number]    Assessment:   Principal Problem:    Atrial fibrillation with RVR ([facility] )  Active Problems:    HTN (hypertension)    Diabetes mellitus type 2 in obese ([facility] )    Shortness of breath    Hyperglycemia  Resolved Problems:    * No resolved hospital problems  *    Plan:   Neuro:   · Delirium precautions  Regulate sleep-wake cycle  (Melatonin 6 mg q h s ) daily CAM ICU  CV:   · Atrial fibrillation with RVR -rate better controlled  Lopressor 50 mg p   O  q 6 hours  Room for up titration  Continue heparin gtt for systemic anticoagulation  · Echo revealing markedly reduced EF 30% with severe diffuse hypokinesis  Daily weights  Low-salt high  Goal fluid balance net 1 L  · NSTEMI 1v 2  Plan for cardiac catheterization prior to discharge  Continue heparin Gtt  Lung:   · Nocturnal dyspnea  BiPAP q h s     · Maintain O2 sats greater than 92%  Wean supplemental O2 as tolerated during waking hours  Encourage up out of bed ambulation  · Good pulmonary hygiene/incentive spirometry  GI:   · Ppx: senna  FEN:   · Fluid restriction  · Optimized electrolytes replete as warranted  ·   :  · Strict Is&Os  Monitor BUN creatinine with active diuresis  · No Guzman catheter  ID:   · No obvious infectious etiology  Monitor fever curve and WBCs  Heme:   · H&H and platelets stable  Continue trend and monitor  · ppx VTe; heparin gtt  Endo:   · Hyperglycemia in setting of insulin-dependent type 2 diabetic  Lantus 20 units q h s     Will likely require up titration this evening  Sliding scale insulin coverage  Msk/Skin:   · Frequent offloading repositioning to avoid skin breakdown  Encourage up out of bed ambulation  Disposition:   · Med surg    ______________________________________________________________________  Chief Complaint:  I slept better last night        HPI/24hr events:     Coreg discontinued initiated on Lopressor 50 mg q 6 hours  Heart rate better controlled over night ranging from low 90s to low 100s  Received total of 60 mg IV Lasix the past 24 hours  Net negative approximately 1 6 L/24 hours  ______________________________________________________________________  Physical Exam:  Hale Agitation Sedation Scale (RASS): Alert and calm  Physical Exam   Constitutional: He is oriented to person, place, and time  He appears well-nourished  HENT:   Head: Normocephalic and atraumatic  Eyes: EOM are normal  Pupils are equal, round, and reactive to light  Neck: Normal range of motion  Neck supple  Cardiovascular: Normal rate and intact distal pulses  No murmur heard  Irregularly irregular  Pulmonary/Chest: Effort normal  No respiratory distress  He has no wheezes  Dyspnea with physical exertion  Fine crackles at the bilateral lung bases  Abdominal: He exhibits no distension  There is no tenderness  There is no rebound  Abdomen distended  Nontender to palpation  Musculoskeletal: Normal range of motion  He exhibits no edema  Neurological: He is alert and oriented to person, place, and time  Skin: Skin is warm  Vitals reviewed  ______________________________________________________________________  Temperature:   Temp (24hrs), Av 7 °F (36 5 °C), Min:97 3 °F (36 3 °C), Max:97 9 °F (36 6 °C)    Current Temperature: (!) 97 3 °F (36 3 °C)    Vitals:    18 2043 18 2314 18 0019 18 0309   BP:  130/79  143/62   Pulse:  (!) 106  96   Resp:  16  22   Temp:  97 6 °F (36 4 °C)  (!) 97 3 °F (36 3 °C)   TempSrc:  Oral  Oral   SpO2: 97% 93% 99% 94%   Weight:    136 kg (299 lb 6 2 oz)   Height:                  Weights:   IBW: 76 45 kg    Body mass index is 41 17 kg/m²    Weight (last 2 days)     Date/Time   Weight    18 0309  136 (299 38)            Height: 5' 11 5" (181 6 cm)    Intake and Outputs:  I/O       01/15 0701 -  0700  0700    P  O  830     I V  (mL/kg) 621 8 (4 4) 478 8 (3 4)    Total Intake(mL/kg) 1451 8 (10 2) 478 8 (3 4)    Urine (mL/kg/hr) 1725 (0 5) 1850 (0 5)    Stool  0 (0)    Total Output 1725 1850    Net -273 2 -1371 2          Unmeasured Stool Occurrence  1 x        Nutrition:        Diet Orders            Start     Ordered    01/14/18 1050  Diet Dinesh/CHO Controlled; Consistent Carbohydrate Diet Level 3 (6 carb servings/90 grams CHO/meal); Cardiac Step 1  Diet effective now     Question Answer Comment   Diet Type Dinesh/CHO Controlled    Dinesh/CHO Controlled Consistent Carbohydrate Diet Level 3 (6 carb servings/90 grams CHO/meal)    Other Restriction(s): Cardiac Step 1    RD to adjust diet per protocol? No        01/14/18 1049        Labs:     Results from last 7 days  Lab Units 01/16/18  0443 01/15/18  0305 01/14/18  1220  01/13/18  2349   WBC Thousand/uL 7 53 9 30 8 58  --  7 43   HEMOGLOBIN g/dL 12 8 13 6 12 4  --  14 2   I STAT HEMOGLOBIN   --   --   --   < >  --    HEMATOCRIT % 39 0 42 9 38 1  --  43 8   PLATELETS Thousands/uL 244 284 255  < > 287   NEUTROS PCT % 75 78*  --   --  75   MONOS PCT % 9 9  --   --  9   < > = values in this interval not displayed       Results from last 7 days  Lab Units 01/15/18  0305 01/14/18  1218 01/14/18  0420 01/13/18  2349   SODIUM mmol/L 139 143  --  138   POTASSIUM mmol/L 3 9 4 1  --  4 0   CHLORIDE mmol/L 106 107  --  101   CO2 mmol/L 19* 22  --  23   BUN mg/dL 30* 23  --  18   CREATININE mg/dL 1 24 1 04  --  1 35*   CALCIUM mg/dL 9 4 9 3  --  9 3   TOTAL PROTEIN g/dL  --   --   --  7 8   BILIRUBIN TOTAL mg/dL  --   --   --  1 00   ALK PHOS U/L  --   --   --  100   ALT U/L  --   --   --  106*   AST U/L  --   --   --  46*   GLUCOSE RANDOM mg/dL 139 181*  --  424*   GLUCOSE, ISTAT mg/dl  --   --  484*  --        Results from last 7 days  Lab Units 01/15/18  0305   MAGNESIUM mg/dL 2 0       Results from last 7 days  Lab Units 01/15/18  0305   PHOSPHORUS mg/dL 4 0        Results from last 7 days  Lab Units 01/16/18  0939 01/15/18  2325 01/15/18  1657  01/14/18  1219   INR   --   --   --   --  1 13   PTT seconds 75* 69* 67*  < > 25   < > = values in this interval not displayed  Results from last 7 days  Lab Units 01/14/18  0616   LACTIC ACID mmol/L 2 3*       0  Lab Value Date/Time   TROPONINI 0 04 01/14/2018 2318   TROPONINI 0 05 (H) 01/14/2018 1953   TROPONINI 0 05 (H) 01/14/2018 1540   TROPONINI 0 05 (H) 01/14/2018 1218   TROPONINI 0 05 (H) 01/14/2018 0925   TROPONINI 0 06 (H) 01/14/2018 0616   TROPONINI 0 04 01/13/2018 2349     Imaging:  I have personally reviewed pertinent reports  EKG:   Micro:  Blood Culture: No results found for: BLOODCX  Urine Culture: No results found for: URINECX  Sputum Culture: No components found for: SPUTUMCX  Wound Culure: No results found for: WOUNDCULT    No results found for: Alric Humbles, WOUNDCULT, SPUTUMCULTUR  Allergies: No Known Allergies  Medications:   Scheduled Meds:    chlorhexidine 15 mL Swish & Spit Q12H [facility] & long-term   insulin glargine 20 Units Subcutaneous HS   insulin lispro 1-5 Units Subcutaneous HS   insulin lispro 2-12 Units Subcutaneous TID AC   melatonin 6 mg Oral HS   metoprolol tartrate 50 mg Oral Q6H   senna 1 tablet Oral HS     Continuous Infusions:    heparin (porcine) 3-20 Units/kg/hr (Order-Specific) Last Rate: 21 1 Units/kg/hr (01/17/18 0023)     PRN Meds:    acetaminophen 650 mg Q6H PRN   heparin (porcine) 2,000 Units PRN   heparin (porcine) 4,000 Units PRN   levalbuterol 1 25 mg Q6H PRN   sodium chloride 3 mL Q6H PRN     VTE Pharmacologic Prophylaxis: Heparin  VTE Mechanical Prophylaxis: sequential compression device  Invasive lines and devices: Invasive Devices     Peripheral Intravenous Line            Peripheral IV 01/14/18 Left Forearm 2 days    Peripheral IV 01/14/18 Right Arm 2 days                   Counseling / Coordination of Care  Total Critical Care time spent 32 minutes excluding procedures, teaching and family updates  Code Status: Level 1 - Full Code    Portions of the record may have been created with voice recognition software  Occasional wrong word or "sound a like" substitutions may have occurred due to the inherent limitations of voice recognition software  Read the chart carefully and recognize, using context, where substitutions have occurred      Isma Daley PA-C

## 2018-01-17 NOTE — PROGRESS NOTES
General Cardiology   Progress Note   Daily Serrano 60 y o  male MRN: [id number]  Unit/Bed#:  Encounter: [id number]        Subjective:   Patient feels well, much improved  HR 90s  Agreeable to cardiac catheterization for tomorrow  Objective:   Vitals:  Vitals:    01/17/18 0750   BP: 142/99   Pulse: 97   Resp: (!) 23   Temp: 97 5 °F (36 4 °C)   SpO2: 98%       Body mass index is 41 17 kg/m²  Systolic (04OMN), ZAV:441 , Min:113 , LWR:888     Diastolic (60PNN), WZU:84, Min:58, Max:99      Intake/Output Summary (Last 24 hours) at 01/17/18 1019  Last data filed at 01/17/18 0934   Gross per 24 hour   Intake           385 38 ml   Output             2200 ml   Net         -1814 62 ml     Weight (last 2 days)     Date/Time   Weight    01/17/18 0309  136 (299 38)              Telemetry Review: A Fib, HR 90s    PHYSICAL EXAMS:  General:  Patient is not in acute distress, laying in the bed comfortably, awake, alert responding to commands  Head: Normocephalic, Atraumatic  HEENT: White sclera, pink conjunctiva,  PERRLA,pharynx benign  Neck:  Supple, no neck vein distention, carotids+2/+2 no bruits, thyromegaly, adenopathy  Respiratory: clear to P/A  Cardiovascular:  +irregularly irregular  PMI normal, S1-S2 normal, No  Murmurs, thrills, gallops, rubs  GI:  Abdomen soft nontender   No hepatosplenomegaly, adenopathy, ascites,or rebound tenderness  Extremities: No edema, normal pulses, no calf tenderness, no joint deformities, no venous disease   Integument:  No skin rashes or ulceration  Lymphatic:  No cervical or inguinal lymphadenopathy  Neurologic:  Patient is awake alert, responding to command, well-oriented to time and place and person moving all extremities      LABORATORY RESULTS:    Results from last 7 days  Lab Units 01/14/18  2318 01/14/18  1953 01/14/18  1540   TROPONIN I ng/mL 0 04 0 05* 0 05*     CBC with diff:   Results from last 7 days  Lab Units 01/17/18  0547 01/16/18  0443 01/15/18  0305   WBC Thousand/uL 6 34 7 53 9 30   HEMOGLOBIN g/dL 12 0 12 8 13 6   HEMATOCRIT % 36 3* 39 0 42 9   MCV fL 88 89 92   PLATELETS Thousands/uL 225 244 284   MCH pg 29 1 29 2 29 2   MCHC g/dL 33 1 32 8 31 7   RDW % 14 7 14 7 15 4*   MPV fL 10 2 10 2 10 0   NRBC AUTO /100 WBCs 0 0 0       CMP:  Results from last 7 days  Lab Units 01/17/18  0551 01/15/18  0305 01/14/18  1218  01/13/18  2349   SODIUM mmol/L 137 139 143  --  138   POTASSIUM mmol/L 3 4* 3 9 4 1  --  4 0   CHLORIDE mmol/L 102 106 107  --  101   CO2 mmol/L 25 19* 22  --  23   ANION GAP mmol/L 10 14* 14*  --  14*   BUN mg/dL 29* 30* 23  --  18   CREATININE mg/dL 1 07 1 24 1 04  --  1 35*   GLUCOSE RANDOM mg/dL 287* 139 181*  --  424*   GLUCOSE, ISTAT   --   --   --   < >  --    CALCIUM mg/dL 8 8 9 4 9 3  --  9 3   AST U/L  --   --   --   --  46*   ALT U/L  --   --   --   --  106*   ALK PHOS U/L  --   --   --   --  100   TOTAL PROTEIN g/dL  --   --   --   --  7 8   ALBUMIN g/dL  --   --   --   --  4 2   BILIRUBIN TOTAL mg/dL  --   --   --   --  1 00   EGFR ml/min/1 73sq m 73 61 76  --  55   < > = values in this interval not displayed      BMP:  Results from last 7 days  Lab Units 01/17/18  0551 01/15/18  0305 01/14/18  1218   SODIUM mmol/L 137 139 143   POTASSIUM mmol/L 3 4* 3 9 4 1   CHLORIDE mmol/L 102 106 107   CO2 mmol/L 25 19* 22   BUN mg/dL 29* 30* 23   CREATININE mg/dL 1 07 1 24 1 04   GLUCOSE RANDOM mg/dL 287* 139 181*   CALCIUM mg/dL 8 8 9 4 9 3           Results from last 7 days  Lab Units 01/14/18  1218   NT-PRO BNP pg/mL 4,171*        Results from last 7 days  Lab Units 01/15/18  0305   MAGNESIUM mg/dL 2 0       Results from last 7 days  Lab Units 01/14/18  0246   HEMOGLOBIN A1C % 9 4*       Results from last 7 days  Lab Units 01/13/18  2349   TSH 3RD GENERATON uIU/mL 3 661       Results from last 7 days  Lab Units 01/14/18  1219   INR  1 13       Lipid Profile:   No results found for: CHOL  No results found for: HDL  No results found for: LDLCALC  No results found for: TRIG    Cardiac testing:   No results found for this or any previous visit  No results found for this or any previous visit  No results found for this or any previous visit  No procedure found  No results found for this or any previous visit  Meds/Allergies   all current active meds have been reviewed and current meds:   Current Facility-Administered Medications   Medication Dose Route Frequency    acetaminophen (TYLENOL) tablet 650 mg  650 mg Oral Q6H PRN    chlorhexidine (PERIDEX) 0 12 % oral rinse 15 mL  15 mL Swish & Spit Q12H [facility]    heparin (porcine) 25,000 units in 250 mL infusion (premix)  3-20 Units/kg/hr (Order-Specific) Intravenous Titrated    heparin (porcine) injection 2,000 Units  2,000 Units Intravenous PRN    heparin (porcine) injection 4,000 Units  4,000 Units Intravenous PRN    insulin glargine (LANTUS) subcutaneous injection 20 Units  20 Units Subcutaneous HS    insulin lispro (HumaLOG) 100 units/mL subcutaneous injection 1-5 Units  1-5 Units Subcutaneous HS    insulin lispro (HumaLOG) 100 units/mL subcutaneous injection 2-12 Units  2-12 Units Subcutaneous TID AC    levalbuterol (XOPENEX) inhalation solution 1 25 mg  1 25 mg Nebulization Q6H PRN    melatonin tablet 6 mg  6 mg Oral HS    metoprolol tartrate (LOPRESSOR) tablet 50 mg  50 mg Oral Q6H    senna (SENOKOT) tablet 8 6 mg  1 tablet Oral HS    sodium chloride 0 9 % inhalation solution 3 mL  3 mL Nebulization Q6H PRN     Prescriptions Prior to Admission   Medication    amLODIPine (NORVASC) 5 mg tablet    insulin glargine (LANTUS) 100 units/mL subcutaneous injection    losartan (COZAAR) 100 MG tablet    metFORMIN (GLUMETZA) 1000 MG (MOD) 24 hr tablet    simvastatin (ZOCOR) 40 mg tablet         heparin (porcine) 3-20 Units/kg/hr (Order-Specific) Last Rate: 21 1 Units/kg/hr (01/17/18 0601)       Assessment/Plan:  1  Atrial fibrillation:  HR improved currently 90s  Patient tolerating Lopressor well  Continue with heparin drip  ECHO shows EF 30% and severe diffuse hypokinesis  Will discuss anticoagulation after cardiac catheterization  Procedure of cardiac catheterization explained  Risk and benefits discussed  Adverse outcomes include 1% risk of bleeding, infection, MI, stroke  All questions answered, informed consent obtained  Will do procedure tomorrow  NPO after midnight      2  NSTEMI type 2 likely secondary to AFib with RVR and Congestive heart failure:  Troponins minimally elevated  ECHO shows EF 30% and severe diffuse hypokinesis  Continue all medications  Continue Lopressor  Monitor on telemetry      3  Acute systolic congestive heart failure:  ECHO shows EF 30% and severe diffuse hypokinesis  PRN diuresis when appropriate  Continue lopressor  No ACEI/ARB for now due to hypotension  Low-salt diet, monitor daily weights, I/O      4  Hypertension:  Last recorded /69  Patient has tendency for hypotension  Continue current regimen  Continue to monitor closely  ** Please Note: Dragon 360 Dictation voice to text software may have been used in the creation of this document   **

## 2018-01-18 ENCOUNTER — APPOINTMENT (INPATIENT)
Dept: INTERVENTIONAL RADIOLOGY/VASCULAR | Facility: HOSPITAL | Age: 63
DRG: 281 | End: 2018-01-18
Attending: INTERNAL MEDICINE
Payer: COMMERCIAL

## 2018-01-18 LAB
ANION GAP SERPL CALCULATED.3IONS-SCNC: 10 MMOL/L (ref 4–13)
APTT PPP: 70 SECONDS (ref 23–35)
BASOPHILS # BLD AUTO: 0.06 THOUSANDS/ΜL (ref 0–0.1)
BASOPHILS NFR BLD AUTO: 1 % (ref 0–1)
BUN SERPL-MCNC: 26 MG/DL (ref 5–25)
CALCIUM SERPL-MCNC: 8.9 MG/DL (ref 8.3–10.1)
CHLORIDE SERPL-SCNC: 101 MMOL/L (ref 100–108)
CO2 SERPL-SCNC: 26 MMOL/L (ref 21–32)
CREAT SERPL-MCNC: 1.12 MG/DL (ref 0.6–1.3)
EOSINOPHIL # BLD AUTO: 0.1 THOUSAND/ΜL (ref 0–0.61)
EOSINOPHIL NFR BLD AUTO: 2 % (ref 0–6)
ERYTHROCYTE [DISTWIDTH] IN BLOOD BY AUTOMATED COUNT: 14.9 % (ref 11.6–15.1)
GFR SERPL CREATININE-BSD FRML MDRD: 70 ML/MIN/1.73SQ M
GLUCOSE SERPL-MCNC: 271 MG/DL (ref 65–140)
GLUCOSE SERPL-MCNC: 277 MG/DL (ref 65–140)
GLUCOSE SERPL-MCNC: 291 MG/DL (ref 65–140)
GLUCOSE SERPL-MCNC: 300 MG/DL (ref 65–140)
GLUCOSE SERPL-MCNC: 321 MG/DL (ref 65–140)
GLUCOSE SERPL-MCNC: 325 MG/DL (ref 65–140)
HCT VFR BLD AUTO: 37.5 % (ref 36.5–49.3)
HGB BLD-MCNC: 12.6 G/DL (ref 12–17)
INR PPP: 1.06 (ref 0.86–1.16)
LYMPHOCYTES # BLD AUTO: 0.88 THOUSANDS/ΜL (ref 0.6–4.47)
LYMPHOCYTES NFR BLD AUTO: 13 % (ref 14–44)
MCH RBC QN AUTO: 29.6 PG (ref 26.8–34.3)
MCHC RBC AUTO-ENTMCNC: 33.6 G/DL (ref 31.4–37.4)
MCV RBC AUTO: 88 FL (ref 82–98)
MONOCYTES # BLD AUTO: 0.6 THOUSAND/ΜL (ref 0.17–1.22)
MONOCYTES NFR BLD AUTO: 9 % (ref 4–12)
NEUTROPHILS # BLD AUTO: 5.18 THOUSANDS/ΜL (ref 1.85–7.62)
NEUTS SEG NFR BLD AUTO: 76 % (ref 43–75)
NRBC BLD AUTO-RTO: 0 /100 WBCS
PLATELET # BLD AUTO: 223 THOUSANDS/UL (ref 149–390)
PMV BLD AUTO: 10.4 FL (ref 8.9–12.7)
POTASSIUM SERPL-SCNC: 3.4 MMOL/L (ref 3.5–5.3)
PROTHROMBIN TIME: 14 SECONDS (ref 12.1–14.4)
RBC # BLD AUTO: 4.25 MILLION/UL (ref 3.88–5.62)
SODIUM SERPL-SCNC: 137 MMOL/L (ref 136–145)
WBC # BLD AUTO: 6.86 THOUSAND/UL (ref 4.31–10.16)

## 2018-01-18 PROCEDURE — 82948 REAGENT STRIP/BLOOD GLUCOSE: CPT

## 2018-01-18 PROCEDURE — 85610 PROTHROMBIN TIME: CPT | Performed by: PHYSICIAN ASSISTANT

## 2018-01-18 PROCEDURE — C1894 INTRO/SHEATH, NON-LASER: HCPCS | Performed by: PHYSICIAN ASSISTANT

## 2018-01-18 PROCEDURE — 85025 COMPLETE CBC W/AUTO DIFF WBC: CPT | Performed by: PHYSICIAN ASSISTANT

## 2018-01-18 PROCEDURE — C1769 GUIDE WIRE: HCPCS | Performed by: PHYSICIAN ASSISTANT

## 2018-01-18 PROCEDURE — 93458 L HRT ARTERY/VENTRICLE ANGIO: CPT | Performed by: PHYSICIAN ASSISTANT

## 2018-01-18 PROCEDURE — B2111ZZ FLUOROSCOPY OF MULTIPLE CORONARY ARTERIES USING LOW OSMOLAR CONTRAST: ICD-10-PCS | Performed by: INTERNAL MEDICINE

## 2018-01-18 PROCEDURE — 94760 N-INVAS EAR/PLS OXIMETRY 1: CPT

## 2018-01-18 PROCEDURE — 4A023N7 MEASUREMENT OF CARDIAC SAMPLING AND PRESSURE, LEFT HEART, PERCUTANEOUS APPROACH: ICD-10-PCS | Performed by: INTERNAL MEDICINE

## 2018-01-18 PROCEDURE — 94660 CPAP INITIATION&MGMT: CPT

## 2018-01-18 PROCEDURE — 80048 BASIC METABOLIC PNL TOTAL CA: CPT | Performed by: PHYSICIAN ASSISTANT

## 2018-01-18 PROCEDURE — B2151ZZ FLUOROSCOPY OF LEFT HEART USING LOW OSMOLAR CONTRAST: ICD-10-PCS | Performed by: INTERNAL MEDICINE

## 2018-01-18 PROCEDURE — 99152 MOD SED SAME PHYS/QHP 5/>YRS: CPT | Performed by: PHYSICIAN ASSISTANT

## 2018-01-18 PROCEDURE — 85730 THROMBOPLASTIN TIME PARTIAL: CPT | Performed by: ANESTHESIOLOGY

## 2018-01-18 RX ORDER — INSULIN GLARGINE 100 [IU]/ML
60 INJECTION, SOLUTION SUBCUTANEOUS
Status: DISCONTINUED | OUTPATIENT
Start: 2018-01-18 | End: 2018-01-20

## 2018-01-18 RX ORDER — MIDAZOLAM HYDROCHLORIDE 1 MG/ML
INJECTION INTRAMUSCULAR; INTRAVENOUS CODE/TRAUMA/SEDATION MEDICATION
Status: COMPLETED | OUTPATIENT
Start: 2018-01-18 | End: 2018-01-18

## 2018-01-18 RX ORDER — INSULIN GLARGINE 100 [IU]/ML
25 INJECTION, SOLUTION SUBCUTANEOUS
Status: DISCONTINUED | OUTPATIENT
Start: 2018-01-18 | End: 2018-01-18

## 2018-01-18 RX ORDER — INSULIN GLARGINE 100 [IU]/ML
40 INJECTION, SOLUTION SUBCUTANEOUS
Status: DISCONTINUED | OUTPATIENT
Start: 2018-01-18 | End: 2018-01-18

## 2018-01-18 RX ORDER — LIDOCAINE HYDROCHLORIDE 10 MG/ML
INJECTION, SOLUTION INFILTRATION; PERINEURAL CODE/TRAUMA/SEDATION MEDICATION
Status: COMPLETED | OUTPATIENT
Start: 2018-01-18 | End: 2018-01-18

## 2018-01-18 RX ORDER — FENTANYL CITRATE 50 UG/ML
INJECTION, SOLUTION INTRAMUSCULAR; INTRAVENOUS CODE/TRAUMA/SEDATION MEDICATION
Status: COMPLETED | OUTPATIENT
Start: 2018-01-18 | End: 2018-01-18

## 2018-01-18 RX ORDER — METOPROLOL TARTRATE 50 MG/1
100 TABLET, FILM COATED ORAL EVERY 12 HOURS SCHEDULED
Status: DISCONTINUED | OUTPATIENT
Start: 2018-01-18 | End: 2018-01-23

## 2018-01-18 RX ORDER — NITROGLYCERIN 20 MG/100ML
INJECTION INTRAVENOUS CODE/TRAUMA/SEDATION MEDICATION
Status: COMPLETED | OUTPATIENT
Start: 2018-01-18 | End: 2018-01-18

## 2018-01-18 RX ORDER — METOPROLOL TARTRATE 50 MG/1
50 TABLET, FILM COATED ORAL EVERY 6 HOURS
Status: DISCONTINUED | OUTPATIENT
Start: 2018-01-18 | End: 2018-01-18

## 2018-01-18 RX ORDER — VERAPAMIL HCL 2.5 MG/ML
AMPUL (ML) INTRAVENOUS CODE/TRAUMA/SEDATION MEDICATION
Status: COMPLETED | OUTPATIENT
Start: 2018-01-18 | End: 2018-01-18

## 2018-01-18 RX ORDER — FUROSEMIDE 40 MG/1
40 TABLET ORAL DAILY
Status: DISCONTINUED | OUTPATIENT
Start: 2018-01-18 | End: 2018-01-26 | Stop reason: HOSPADM

## 2018-01-18 RX ADMIN — FENTANYL CITRATE 50 MCG: 50 INJECTION, SOLUTION INTRAMUSCULAR; INTRAVENOUS at 07:53

## 2018-01-18 RX ADMIN — INSULIN LISPRO 6 UNITS: 100 INJECTION, SOLUTION INTRAVENOUS; SUBCUTANEOUS at 09:54

## 2018-01-18 RX ADMIN — METOPROLOL TARTRATE 50 MG: 50 TABLET ORAL at 12:17

## 2018-01-18 RX ADMIN — LIDOCAINE HYDROCHLORIDE 1 ML: 10 INJECTION, SOLUTION INFILTRATION; PERINEURAL at 07:53

## 2018-01-18 RX ADMIN — FUROSEMIDE 40 MG: 40 TABLET ORAL at 09:28

## 2018-01-18 RX ADMIN — IOHEXOL 60 ML: 350 INJECTION, SOLUTION INTRAVENOUS at 08:03

## 2018-01-18 RX ADMIN — INSULIN GLARGINE 60 UNITS: 100 INJECTION, SOLUTION SUBCUTANEOUS at 23:00

## 2018-01-18 RX ADMIN — CHLORHEXIDINE GLUCONATE 15 ML: 1.2 RINSE ORAL at 09:28

## 2018-01-18 RX ADMIN — MIDAZOLAM HYDROCHLORIDE 1 MG: 1 INJECTION, SOLUTION INTRAMUSCULAR; INTRAVENOUS at 07:53

## 2018-01-18 RX ADMIN — INSULIN LISPRO 6 UNITS: 100 INJECTION, SOLUTION INTRAVENOUS; SUBCUTANEOUS at 17:55

## 2018-01-18 RX ADMIN — HEPARIN SODIUM AND DEXTROSE 21.1 UNITS/KG/HR: 10000; 5 INJECTION INTRAVENOUS at 05:03

## 2018-01-18 RX ADMIN — SENNOSIDES 8.6 MG: 8.6 TABLET, FILM COATED ORAL at 22:59

## 2018-01-18 RX ADMIN — CHLORHEXIDINE GLUCONATE 15 ML: 1.2 RINSE ORAL at 22:00

## 2018-01-18 RX ADMIN — MELATONIN TAB 3 MG 6 MG: 3 TAB at 23:00

## 2018-01-18 RX ADMIN — INSULIN LISPRO 3 UNITS: 100 INJECTION, SOLUTION INTRAVENOUS; SUBCUTANEOUS at 23:00

## 2018-01-18 RX ADMIN — INSULIN LISPRO 8 UNITS: 100 INJECTION, SOLUTION INTRAVENOUS; SUBCUTANEOUS at 12:18

## 2018-01-18 RX ADMIN — METOPROLOL TARTRATE 100 MG: 50 TABLET ORAL at 22:00

## 2018-01-18 RX ADMIN — APIXABAN 5 MG: 5 TABLET, FILM COATED ORAL at 17:58

## 2018-01-18 RX ADMIN — APIXABAN 5 MG: 5 TABLET, FILM COATED ORAL at 13:39

## 2018-01-18 RX ADMIN — VERAPAMIL HYDROCHLORIDE 2.5 MG: 2.5 INJECTION, SOLUTION INTRAVENOUS at 07:55

## 2018-01-18 RX ADMIN — NITROGLYCERIN 200 MCG: 20 INJECTION INTRAVENOUS at 07:54

## 2018-01-18 RX ADMIN — METOPROLOL TARTRATE 50 MG: 50 TABLET ORAL at 05:02

## 2018-01-18 NOTE — PROGRESS NOTES
[facility] Internal Medicine Progress Note  Patient: Deborah Cooper 60 y o  male   MRN: [id number]  PCP: No primary care provider on file  Unit/Bed#:  Encounter: [id number]  Date Of Visit: 01/18/18    Pt seen in ICU  Still very SOB in bed and worse with minimal exertion  D/W pt and family plan of care

## 2018-01-18 NOTE — PROGRESS NOTES
Transfer Note - ICU Transfer to [facility]   Lisa Page 60 y o  male MRN: [id number]  [address] [facility]   Unit/Bed#:  Encounter: [id number]    Code Status: Level 1 - Full Code  POA:    POLST:      Reason for ICU adm: afib with RVR    Active problems:   Principal Problem:    Atrial fibrillation with RVR ([facility] [address] )  Active Problems:    HTN (hypertension)    Diabetes mellitus type 2 in obese ([facility] [address] )    Shortness of breath    Hyperglycemia  Resolved Problems:    * No resolved hospital problems  *      Consultants:   Dr Italia Cruz    History of Present Illness:   Pt is a 60year old male with PMH of diabetes who presents to the hospital with nocturnal dyspnea  His diabetic meds had recently been changed by his cardiologist and he felt that his symptoms may have been attributed to the change in medication  When he arrived to the ED he was found to be in afib with RVR and SOB requiring bipap  Summary of clinical course: The patient was admitted to the ICU and placed on bipap  He was initially started on an amiodarone drip, but his heart rate did not improve much  He was also started on a heparin drip for anticoagulation  An echo showed an EF of  30% with severe diffuse hypokinesis  Metoprolol was started and Amio was stopped  He had better heart rate control with the metoprolol  He has been diuresed with lasix and his resp status has improved  Metoprolol has been up titrated  He underwent a cardiac cath on 1/18, and no interventions were performed  He has been started on Eliquis and his heparin drip was stopped  He is on SSI and lantus for his diabetes      Recent or scheduled procedures: 1/18 cardiac cath-no interventions    Outstanding/pending diagnostics: none    Cultures: 1/14 influenza negative       Mobilization Plan: OOB as able    Nutrition Plan: cardiac step 1, [facility] diet    Discharge Plan:   Patient should be ready for discharge to home     Initial Physical Therapy Recommendations: home with family support  Initial Occupational Therapy Recommendations: pending  Initial /Plan: home, possibly with home health is needed     Specific Diagnosis Plan:    Sepsis: N/A  Heart Failure:  Cardiology consult placed  Diuresis plan: lasix daily  Hyperglycemia:  subcutaneous insulin: Lantus (dose increased 1/18) and SSI    Need for Endocrine consult: no    Spoke with Dr Morales Rojo  regarding transfer  Please call [phone] with any questions or concerns  Portions of the record may have been created with voice recognition software  Occasional wrong word or "sound a like" substitutions may have occurred due to the inherent limitations of voice recognition software  Read the chart carefully and recognize, using context, where substitutions have occurred      Lakeisha Ryan

## 2018-01-18 NOTE — PROGRESS NOTES
General Cardiology   Progress Note   Blair Barahona 60 y o  male MRN: [id number]  Unit/Bed#:  Encounter: [id number]        Subjective:   Patient feels well  Still SOB  BP low, 35V-435D systolic  HR fluctuates but usually 90s-110s  Cardiac catheterization unremarkable, mild CAD  Patient's anticoagulation agent of choice is Eliquis  Objective:   Vitals:  Vitals:    18 1030   BP: (!) 85/65   Pulse: 95   Resp: 21   Temp:    SpO2: 97%       Body mass index is 41 26 kg/m²  Systolic (97GCH), LLL:446 , Min:78 , MZB:921     Diastolic (49PPU), PY, Min:46, Max:84      Intake/Output Summary (Last 24 hours) at 18 1058  Last data filed at 18 0550   Gross per 24 hour   Intake           657 37 ml   Output             1500 ml   Net          -842 63 ml     Weight (last 2 days)     Date/Time   Weight    18 0314  (!)  136 (300 05)    18 0309  136 (299 38)              Telemetry Review:  Atrial fibrillation, HR 90s to 110s  PHYSICAL EXAMS:  General:  Patient is not in acute distress, laying in the bed comfortably, awake, alert responding to commands  Head: Normocephalic, Atraumatic  HEENT: White sclera, pink conjunctiva,  PERRLA,pharynx benign  Neck:  Supple, no neck vein distention, carotids+2/+2 no bruits, thyromegaly, adenopathy  Respiratory: clear to P/A  Cardiovascular:  + irregularly irregular  PMI normal, S1-S2 normal, No  Murmurs, thrills, gallops, rubs   GI:  Abdomen soft nontender   No hepatosplenomegaly, adenopathy, ascites,or rebound tenderness  Extremities: No edema, normal pulses, no calf tenderness, no joint deformities, no venous disease   Integument:  No skin rashes or ulceration  Lymphatic:  No cervical or inguinal lymphadenopathy  Neurologic:  Patient is awake alert, responding to command, well-oriented to time and place and person moving all extremities      LABORATORY RESULTS:    Results from last 7 days  Lab Units 18  2318 18  1953 18  1540 TROPONIN I ng/mL 0 04 0 05* 0 05*     CBC with diff:   Results from last 7 days  Lab Units 01/18/18  0537 01/17/18  0547 01/16/18  0443   WBC Thousand/uL 6 86 6 34 7 53   HEMOGLOBIN g/dL 12 6 12 0 12 8   HEMATOCRIT % 37 5 36 3* 39 0   MCV fL 88 88 89   PLATELETS Thousands/uL 223 225 244   MCH pg 29 6 29 1 29 2   MCHC g/dL 33 6 33 1 32 8   RDW % 14 9 14 7 14 7   MPV fL 10 4 10 2 10 2   NRBC AUTO /100 WBCs 0 0 0       CMP:  Results from last 7 days  Lab Units 01/18/18  0537 01/17/18  0551 01/15/18  0305  01/13/18  2349   SODIUM mmol/L 137 137 139  < > 138   POTASSIUM mmol/L 3 4* 3 4* 3 9  < > 4 0   CHLORIDE mmol/L 101 102 106  < > 101   CO2 mmol/L 26 25 19*  < > 23   ANION GAP mmol/L 10 10 14*  < > 14*   BUN mg/dL 26* 29* 30*  < > 18   CREATININE mg/dL 1 12 1 07 1 24  < > 1 35*   GLUCOSE RANDOM mg/dL 321* 287* 139  < > 424*   GLUCOSE, ISTAT   --   --   --   < >  --    CALCIUM mg/dL 8 9 8 8 9 4  < > 9 3   AST U/L  --   --   --   --  46*   ALT U/L  --   --   --   --  106*   ALK PHOS U/L  --   --   --   --  100   TOTAL PROTEIN g/dL  --   --   --   --  7 8   ALBUMIN g/dL  --   --   --   --  4 2   BILIRUBIN TOTAL mg/dL  --   --   --   --  1 00   EGFR ml/min/1 73sq m 70 73 61  < > 55   < > = values in this interval not displayed      BMP:  Results from last 7 days  Lab Units 01/18/18  0537 01/17/18  0551 01/15/18  0305   SODIUM mmol/L 137 137 139   POTASSIUM mmol/L 3 4* 3 4* 3 9   CHLORIDE mmol/L 101 102 106   CO2 mmol/L 26 25 19*   BUN mg/dL 26* 29* 30*   CREATININE mg/dL 1 12 1 07 1 24   GLUCOSE RANDOM mg/dL 321* 287* 139   CALCIUM mg/dL 8 9 8 8 9 4           Results from last 7 days  Lab Units 01/14/18  1218   NT-PRO BNP pg/mL 4,171*        Results from last 7 days  Lab Units 01/15/18  0305   MAGNESIUM mg/dL 2 0       Results from last 7 days  Lab Units 01/14/18  0246   HEMOGLOBIN A1C % 9 4*       Results from last 7 days  Lab Units 01/13/18  2349   TSH 3RD GENERATON uIU/mL 3 661       Results from last 7 days  Lab Units 01/18/18  0537 01/14/18  1219   INR  1 06 1 13       Lipid Profile:   No results found for: CHOL  No results found for: HDL  No results found for: LDLCALC  No results found for: TRIG    Cardiac testing:   No results found for this or any previous visit  No results found for this or any previous visit  No results found for this or any previous visit  No procedure found  No results found for this or any previous visit      Meds/Allergies   all current active meds have been reviewed and current meds:   Current Facility-Administered Medications   Medication Dose Route Frequency    acetaminophen (TYLENOL) tablet 650 mg  650 mg Oral Q6H PRN    chlorhexidine (PERIDEX) 0 12 % oral rinse 15 mL  15 mL Swish & Spit Q12H [address]    furosemide (LASIX) tablet 40 mg  40 mg Oral Daily    heparin (porcine) 25,000 units in 250 mL infusion (premix)  3-20 Units/kg/hr (Order-Specific) Intravenous Titrated    heparin (porcine) injection 2,000 Units  2,000 Units Intravenous PRN    heparin (porcine) injection 4,000 Units  4,000 Units Intravenous PRN    insulin glargine (LANTUS) subcutaneous injection 25 Units  25 Units Subcutaneous HS    insulin lispro (HumaLOG) 100 units/mL subcutaneous injection 1-5 Units  1-5 Units Subcutaneous HS    insulin lispro (HumaLOG) 100 units/mL subcutaneous injection 2-12 Units  2-12 Units Subcutaneous TID AC    levalbuterol (XOPENEX) inhalation solution 1 25 mg  1 25 mg Nebulization Q6H PRN    melatonin tablet 6 mg  6 mg Oral HS    metoprolol tartrate (LOPRESSOR) tablet 50 mg  50 mg Oral Q6H    senna (SENOKOT) tablet 8 6 mg  1 tablet Oral HS    sodium chloride 0 9 % inhalation solution 3 mL  3 mL Nebulization Q6H PRN     Prescriptions Prior to Admission   Medication    amLODIPine (NORVASC) 5 mg tablet    insulin glargine (LANTUS) 100 units/mL subcutaneous injection    losartan (COZAAR) 100 MG tablet    metFORMIN (GLUMETZA) 1000 MG (MOD) 24 hr tablet    simvastatin (ZOCOR) 40 mg tablet heparin (porcine) 3-20 Units/kg/hr (Order-Specific) Last Rate: 21 1 Units/kg/hr (01/18/18 3679)       Assessment/Plan:  1  Atrial fibrillation:  HR improved currently 90s, sometimes fluctuates up to 110s  Patient still in atrial fibrillation  Will increase lopressor to 100 mg BID  Patient now has had episodes of hypotension, lowest recorded BP 79/46  Cardiac catheterization unremarkable  Will start Eliquis 5 mg      2   NSTEMI type 2 likely secondary to AFib with RVR and Congestive heart failure:  Troponins minimally elevated  Echo shows EF 30% and severe diffuse hypokinesis  Continue all medications, including Lopressor  Continue to monitor on telemetry  3   Acute systolic Congestive heart failure:  Echo shows EF 30% and severe diffuse hypokinesis  Will change lopressor to 100 mg BID, PO lasix  No ACEI/ARB for now due to hypotension  Low-salt diet, monitor daily weights, I/O     4   Hypertension:  Patient now has tendency for hypotension  Continue current regimen within BP parameters  Continue to monitor closely  Expected to be d/c tomorrow  ** Please Note: Dragon 360 Dictation voice to text software may have been used in the creation of this document   **

## 2018-01-18 NOTE — PLAN OF CARE
Problem: DISCHARGE PLANNING - CARE MANAGEMENT  Goal: Discharge to post-acute care or home with appropriate resources  INTERVENTIONS:  - Conduct assessment to determine patient/family and health care team treatment goals, and need for post-acute services based on payer coverage, community resources, and patient preferences, and barriers to discharge  - Address psychosocial, clinical, and financial barriers to discharge as identified in assessment in conjunction with the patient/family and health care team  - Arrange appropriate level of post-acute services according to patients   needs and preference and payer coverage in collaboration with the physician and health care team  - Communicate with and update the patient/family, physician, and health care team regarding progress on the discharge plan  - Arrange appropriate transportation to post-acute venues   Outcome: Progressing  CM spoke to daughter Austin Zazueta via phone  She is concerned that Dr Dickson Solis spoke about d/cing her father tomorrow  She also wants to know if he needs O2 on d/c and she was inquiring about the cost of a BP machine that also reads the pulse  CM spoke to [facility] and pt is not slated to go home tomorrow  He may be transferred to [place]  She is also hopeful that pt will not need O2 by the time he is discharged  CM also contacted [facility] and found out that the preferred brand of BP machines is "omron" which [employer] does not cover  They recommend that pt go to the local [employer]/pharmacy and buy this brand  It will be around $40 00 and is not covered by insurance

## 2018-01-18 NOTE — PROGRESS NOTES
Progress Note - Critical Care   Clotilde Riojas 60 y o  male MRN: [id number]  Unit/Bed#:  Encounter: [id number]    Attending Physician: Logan Cordova MD      ______________________________________________________________________  Assessment and Plan:   Principal Problem:    Atrial fibrillation with RVR ([facility] )  Active Problems:    HTN (hypertension)    Diabetes mellitus type 2 in obese ([facility] )    Shortness of breath    Hyperglycemia  Resolved Problems:    * No resolved hospital problems  *        Neuro:   [facility]  Sleep hygiene    CV:   Afib with RVR-rate better controlled with po metoprolol, on heparin drip  Acute systolic heart failure-EF 30%, lasix as needed, for cardiac cath today  NSTEMI type 1 vs type 2-cath today    Pulm:   Acute hypoxic resp insufficiency-likely 2/2 acute systolic heaert failure, volume overload-has improved with diuresis and rate control, bipap at night, wean oxygen as tolerated for sats >92%    GI:   Tolerating po diet    : Monitor I/Os, follow renal indices    F/E/N:   Monitor lytes and replace as needed    ID:   Monitor fever curve and WBC    Heme:   Heparin gtt for anticoagulation    Endo:   DM type 2-continue accuchecks and SSI, lantus dose increased     Msk/Skin:   OOB as able    Disposition:   For cardiac cath today, if no intervention can likely transfer to med-surg/tele, if intervention keep in stepdown today    Code Status: Level 1 - Full Code  ______________________________________________________________________    24 Hour Events:   No new events  Doing better    ______________________________________________________________________  Physical Exam   Constitutional: Oriented to person, place, and time and well-developed, well-nourished, and in no acute distress  Head: Normocephalic and atraumatic  Eyes: Conjunctivae are normal  Pupils are equal, round, and reactive to light  Neck: Neck supple  No JVD present  Cardiovascular: Irregular rhythm and normal heart sounds  Exam reveals no gallop and no friction rub  No murmur heard  Pulmonary/Chest: Bilat rales in bases  Abdominal: Soft  Bowel sounds are normal  No distension  No tenderness  Genitourinary: Deferred   Musculoskeletal: Normal range of motion  No clubbing, cyanosis, or edema  Lymphadenopathy:   No cervical adenopathy  Neurological: Alert and oriented to person, place, and time  No cranial nerve deficit  GCS score is 15  Skin: Skin is warm and dry  No rash noted  Psychiatric: Mood and affect normal      ______________________________________________________________________  Vitals:    18 0000 18 0314 18 0400 18 0420   BP: 145/84  96/70    BP Location:       Pulse: (!) 109 (!) 113 (!) 108    Resp: (!) 26 (!) 24 19    Temp:  97 7 °F (36 5 °C) 97 9 °F (36 6 °C)    TempSrc:  Oral Oral    SpO2: 96% 97% 96% 97%   Weight:  (!) 136 kg (300 lb 0 7 oz)     Height:           Temperature:   Temp (24hrs), Av 6 °F (36 4 °C), Min:97 3 °F (36 3 °C), Max:98 °F (36 7 °C)    Current Temperature: 97 9 °F (36 6 °C)  Weights:   IBW: 76 45 kg    Body mass index is 41 26 kg/m²  Weight (last 2 days)     Date/Time   Weight    18 0314  (!)  136 (300 05)    18 0309  136 (299 38)            Hemodynamic Monitoring:  N/A     Non-Invasive/Invasive Ventilation Settings:  Respiratory    Lab Data (Last 4 hours)    None         O2/Vent Data (Last 4 hours)       0420          Non-Invasive Ventilation Mode BiPAP                 No results found for: PHART, XPW6RGN, PO2ART, XGW2IPD, U3FPYHXI, BEART, SOURCE  SpO2: SpO2: 97 %  Intake and Outputs:  I/O        07 -  0700  07 -  0700  07 -  0700    P  O   240     I V  (mL/kg) 592 8 (4 4) 417 4 (3 1)     Total Intake(mL/kg) 592 8 (4 4) 657 4 (4 8)     Urine (mL/kg/hr) 2100 (0 6) 1900 (0 6)     Stool 0 (0) 0 (0)     Total Output 2100 1900      Net -1507 2 -1242 6             Unmeasured Stool Occurrence 1 x 1 x Nutrition:        Diet Orders            Start     Ordered    01/19/18 0000  Diet NPO; Sips of clear liquids  Diet effective midnight     Question Answer Comment   Diet Type NPO    NPO Except: Sips of clear liquids    RD to adjust diet per protocol? Yes        01/18/18 0121    01/18/18 0001  Diet NPO  Diet effective midnight     Question Answer Comment   Diet Type NPO    RD to adjust diet per protocol? Yes        01/17/18 1501          Labs:     Results from last 7 days  Lab Units 01/18/18  0537 01/17/18  0547 01/16/18  0443   WBC Thousand/uL 6 86 6 34 7 53   HEMOGLOBIN g/dL 12 6 12 0 12 8   HEMATOCRIT % 37 5 36 3* 39 0   PLATELETS Thousands/uL 223 225 244   NEUTROS PCT % 76* 69 75   MONOS PCT % 9 10 9       Results from last 7 days  Lab Units 01/18/18  0537 01/17/18  0551 01/15/18  0305  01/13/18  2349   SODIUM mmol/L 137 137 139  < > 138   POTASSIUM mmol/L 3 4* 3 4* 3 9  < > 4 0   CHLORIDE mmol/L 101 102 106  < > 101   CO2 mmol/L 26 25 19*  < > 23   BUN mg/dL 26* 29* 30*  < > 18   CREATININE mg/dL 1 12 1 07 1 24  < > 1 35*   CALCIUM mg/dL 8 9 8 8 9 4  < > 9 3   TOTAL PROTEIN g/dL  --   --   --   --  7 8   BILIRUBIN TOTAL mg/dL  --   --   --   --  1 00   ALK PHOS U/L  --   --   --   --  100   ALT U/L  --   --   --   --  106*   AST U/L  --   --   --   --  46*   GLUCOSE RANDOM mg/dL 321* 287* 139  < > 424*   GLUCOSE, ISTAT   --   --   --   < >  --    < > = values in this interval not displayed  Results from last 7 days  Lab Units 01/15/18  0305   MAGNESIUM mg/dL 2 0     Lab Results   Component Value Date    PHOS 4 0 01/15/2018        Results from last 7 days  Lab Units 01/18/18  0537 01/17/18  0548 01/16/18  0939  01/14/18  1219   INR  1 06  --   --   --  1 13   PTT seconds 70* 60* 75*  < > 25   < > = values in this interval not displayed      0  Lab Value Date/Time   TROPONINI 0 04 01/14/2018 2318   TROPONINI 0 05 (H) 01/14/2018 1953   TROPONINI 0 05 (H) 01/14/2018 1540   TROPONINI 0 05 (H) 01/14/2018 1218 TROPONINI 0 05 (H) 01/14/2018 0925   TROPONINI 0 06 (H) 01/14/2018 0616   TROPONINI 0 04 01/13/2018 2349       Results from last 7 days  Lab Units 01/14/18  0616 01/14/18  0246   LACTIC ACID mmol/L 2 3* 2 4*     ABG:No results found for: PHART, GXX1YTB, PO2ART, BAE3LHU, L8SLOEHD, BEART, SOURCE    EKG: Afib  Micro:  No results found for: Glee Salen, WOUNDCULT, SPUTUMCULTUR  Allergies: No Known Allergies  Medications:   Scheduled Meds:  chlorhexidine 15 mL Swish & Spit Q12H [address]   insulin glargine 20 Units Subcutaneous HS   insulin lispro 1-5 Units Subcutaneous HS   insulin lispro 2-12 Units Subcutaneous TID AC   melatonin 6 mg Oral HS   metoprolol tartrate 50 mg Oral Q6H   senna 1 tablet Oral HS     Continuous Infusions:  heparin (porcine) 3-20 Units/kg/hr (Order-Specific) Last Rate: 21 1 Units/kg/hr (01/18/18 0503)     PRN Meds:    acetaminophen 650 mg Q6H PRN   heparin (porcine) 2,000 Units PRN   heparin (porcine) 4,000 Units PRN   levalbuterol 1 25 mg Q6H PRN   sodium chloride 3 mL Q6H PRN     VTE Pharmacologic Prophylaxis: Heparin Drip  VTE Mechanical Prophylaxis: sequential compression device  Invasive lines and devices: Invasive Devices     Peripheral Intravenous Line            Peripheral IV 01/14/18 Left Forearm 3 days                     Portions of the record may have been created with voice recognition software  Occasional wrong word or "sound a like" substitutions may have occurred due to the inherent limitations of voice recognition software  Read the chart carefully and recognize, using context, where substitutions have occurred      Presley [place]

## 2018-01-18 NOTE — SOCIAL WORK
JENNIFER spoke to daughter Jeff Farias via phone  She is concerned that Dr Jero Wang spoke about d/cing her father tomorrow  She also wants to know if he needs O2 on d/c and she was inquiring about the cost of a BP machine that also reads the pulse  JENNIFER spoke to [facility] and pt is not slated to go home tomorrow  He may be transferred to [facility]  She is also hopeful that pt will not need O2 by the time he is discharged  JENNIFER also contacted [facility] and found out that the preferred brand of BP machines is "omron" which [employer] does not cover  They recommend that pt go to the local [employer]/pharmacy and buy this brand  It will be around $40 00 and is not covered by insurance

## 2018-01-18 NOTE — CASE MANAGEMENT
Continued Stay Review    Date: 01/18/18    Vital Signs: /78   Pulse 96   Temp 97 9 °F (36 6 °C) (Oral)   Resp 22   Ht 5' 11 5" (1 816 m)   Wt (!) 136 kg (300 lb 0 7 oz)   SpO2 97%   BMI 41 26 kg/m²     Medications:   Scheduled Meds:   apixaban 5 mg Oral BID   chlorhexidine 15 mL Swish & Spit Q12H [address]   furosemide 40 mg Oral Daily   insulin glargine 40 Units Subcutaneous HS   insulin lispro 1-5 Units Subcutaneous HS   insulin lispro 2-12 Units Subcutaneous TID AC   melatonin 6 mg Oral HS   metoprolol tartrate 100 mg Oral Q12H [address]   senna 1 tablet Oral HS     Continuous Infusions:    PRN Meds:   acetaminophen    levalbuterol    sodium chloride    Abnormal Labs/Diagnostic Results:   Results from last 7 days  Lab 01/18/18  0537 01/17/18  0547 01/16/18  0443   HEMATOCRIT 37 5 36 3* 39 0   NEUTROS PCT 76* 69 75     Results from last 7 days  Lab 01/18/18  0537 01/17/18  0551 01/15/18  0305 01/13/18  2349   POTASSIUM 3 4* 3 4* 3 9 4 0   CO2 26 25 19* 23   BUN 26* 29* 30* 18   CREATININE 1 12 1 07 1 24 1 35*   ALT  --   --   --  106*   AST  --   --   --  46*   GLUCOSE RANDOM 321* 287* 139 424*     Results from last 7 days  Lab 01/18/18  0537 01/17/18  0548 01/16/18  0939 01/14/18  1219   PTT 70* 60* 75* 25     Age/Sex: 60 y o  male     Assessment and Plan:   Principal Problem:    Atrial fibrillation with RVR ([address] )  Active Problems:    HTN (hypertension)    Diabetes mellitus type 2 in obese (HCC)    Shortness of breath    Hyperglycemia  Resolved Problems:    * No resolved hospital problems   *           Neuro:   [facility]  Sleep hygiene     CV:   Afib with RVR-rate better controlled with po metoprolol, on heparin drip  Acute systolic heart failure-EF 30%, lasix as needed, for cardiac cath today  NSTEMI type 1 vs type 2-cath today     Pulm:   Acute hypoxic resp insufficiency-likely 2/2 acute systolic heaert failure, volume overload-has improved with diuresis and rate control, bipap at night, wean oxygen as tolerated for sats >92%     GI:   Tolerating po diet     : Monitor I/Os, follow renal indices     F/E/N:   Monitor lytes and replace as needed     ID: Monitor fever curve and WBC     Heme:   Heparin gtt for anticoagulation     Endo:   DM type 2-continue accuchecks and SSI, lantus dose increased                Msk/Skin:   OOB as able     Disposition:   For cardiac cath today, if no intervention can likely transfer to med-surg/tele, if intervention keep in stepdown today    Discharge Plan: TBD          Thank you,  [facility] in the [facility] by [facility]ouse for 2017  Network Utilization Review Department  Phone: [phone]; Fax [phone]  ATTENTION: The Network Utilization Review Department is now centralized for our 7 Facilities  Make a note that we have a new phone and fax numbers for our Department  Please call with any questions or concerns to [phone] and carefully follow the prompts so that you are directed to the right person  All voicemails are confidential  Fax any determinations, approvals, denials, and requests for initial or continue stay review clinical to [phone]  Due to HIGH CALL volume, it would be easier if you could please send faxed requests to expedite your requests and in part, help us provide discharge notifications faster

## 2018-01-19 PROBLEM — I50.41 ACUTE COMBINED SYSTOLIC AND DIASTOLIC HEART FAILURE (HCC): Status: ACTIVE | Noted: 2018-01-14

## 2018-01-19 LAB
ANION GAP SERPL CALCULATED.3IONS-SCNC: 8 MMOL/L (ref 4–13)
BASE EX.OXY STD BLDV CALC-SCNC: 95.2 % (ref 60–80)
BASE EXCESS BLDV CALC-SCNC: -0.2 MMOL/L
BUN SERPL-MCNC: 26 MG/DL (ref 5–25)
CALCIUM SERPL-MCNC: 8.6 MG/DL (ref 8.3–10.1)
CHLORIDE SERPL-SCNC: 100 MMOL/L (ref 100–108)
CO2 SERPL-SCNC: 27 MMOL/L (ref 21–32)
CREAT SERPL-MCNC: 1.09 MG/DL (ref 0.6–1.3)
ERYTHROCYTE [DISTWIDTH] IN BLOOD BY AUTOMATED COUNT: 14.8 % (ref 11.6–15.1)
GFR SERPL CREATININE-BSD FRML MDRD: 72 ML/MIN/1.73SQ M
GLUCOSE SERPL-MCNC: 194 MG/DL (ref 65–140)
GLUCOSE SERPL-MCNC: 252 MG/DL (ref 65–140)
GLUCOSE SERPL-MCNC: 260 MG/DL (ref 65–140)
GLUCOSE SERPL-MCNC: 282 MG/DL (ref 65–140)
GLUCOSE SERPL-MCNC: 337 MG/DL (ref 65–140)
HCO3 BLDV-SCNC: 25.1 MMOL/L (ref 24–30)
HCT VFR BLD AUTO: 38.9 % (ref 36.5–49.3)
HGB BLD-MCNC: 12.8 G/DL (ref 12–17)
MCH RBC QN AUTO: 29.3 PG (ref 26.8–34.3)
MCHC RBC AUTO-ENTMCNC: 32.9 G/DL (ref 31.4–37.4)
MCV RBC AUTO: 89 FL (ref 82–98)
O2 CT BLDV-SCNC: 18.5 ML/DL
PCO2 BLDV: 43.6 MM HG (ref 42–50)
PH BLDV: 7.38 [PH] (ref 7.3–7.4)
PLATELET # BLD AUTO: 230 THOUSANDS/UL (ref 149–390)
PMV BLD AUTO: 10.2 FL (ref 8.9–12.7)
PO2 BLDV: 87.6 MM HG (ref 35–45)
POTASSIUM SERPL-SCNC: 3.5 MMOL/L (ref 3.5–5.3)
RBC # BLD AUTO: 4.37 MILLION/UL (ref 3.88–5.62)
SODIUM SERPL-SCNC: 135 MMOL/L (ref 136–145)
WBC # BLD AUTO: 7.91 THOUSAND/UL (ref 4.31–10.16)

## 2018-01-19 PROCEDURE — G8988 SELF CARE GOAL STATUS: HCPCS

## 2018-01-19 PROCEDURE — 97166 OT EVAL MOD COMPLEX 45 MIN: CPT

## 2018-01-19 PROCEDURE — G8987 SELF CARE CURRENT STATUS: HCPCS

## 2018-01-19 PROCEDURE — 80048 BASIC METABOLIC PNL TOTAL CA: CPT | Performed by: INTERNAL MEDICINE

## 2018-01-19 PROCEDURE — 82948 REAGENT STRIP/BLOOD GLUCOSE: CPT

## 2018-01-19 PROCEDURE — 82805 BLOOD GASES W/O2 SATURATION: CPT | Performed by: ANESTHESIOLOGY

## 2018-01-19 PROCEDURE — 85027 COMPLETE CBC AUTOMATED: CPT | Performed by: INTERNAL MEDICINE

## 2018-01-19 RX ORDER — LISINOPRIL 5 MG/1
5 TABLET ORAL DAILY
Status: DISCONTINUED | OUTPATIENT
Start: 2018-01-19 | End: 2018-01-26 | Stop reason: HOSPADM

## 2018-01-19 RX ORDER — DIGOXIN 125 MCG
500 TABLET ORAL ONCE
Status: COMPLETED | OUTPATIENT
Start: 2018-01-19 | End: 2018-01-19

## 2018-01-19 RX ORDER — DIGOXIN 125 MCG
125 TABLET ORAL DAILY
Status: DISCONTINUED | OUTPATIENT
Start: 2018-01-20 | End: 2018-01-26 | Stop reason: HOSPADM

## 2018-01-19 RX ADMIN — APIXABAN 5 MG: 5 TABLET, FILM COATED ORAL at 16:56

## 2018-01-19 RX ADMIN — DIGOXIN 500 MCG: 0.12 TABLET ORAL at 13:05

## 2018-01-19 RX ADMIN — INSULIN LISPRO 2 UNITS: 100 INJECTION, SOLUTION INTRAVENOUS; SUBCUTANEOUS at 22:00

## 2018-01-19 RX ADMIN — MELATONIN TAB 3 MG 6 MG: 3 TAB at 21:39

## 2018-01-19 RX ADMIN — CHLORHEXIDINE GLUCONATE 15 ML: 1.2 RINSE ORAL at 08:30

## 2018-01-19 RX ADMIN — METOPROLOL TARTRATE 100 MG: 50 TABLET ORAL at 08:29

## 2018-01-19 RX ADMIN — SENNOSIDES 8.6 MG: 8.6 TABLET, FILM COATED ORAL at 21:40

## 2018-01-19 RX ADMIN — METOPROLOL TARTRATE 100 MG: 50 TABLET ORAL at 21:39

## 2018-01-19 RX ADMIN — INSULIN LISPRO 2 UNITS: 100 INJECTION, SOLUTION INTRAVENOUS; SUBCUTANEOUS at 08:32

## 2018-01-19 RX ADMIN — INSULIN LISPRO 8 UNITS: 100 INJECTION, SOLUTION INTRAVENOUS; SUBCUTANEOUS at 12:31

## 2018-01-19 RX ADMIN — INSULIN GLARGINE 60 UNITS: 100 INJECTION, SOLUTION SUBCUTANEOUS at 21:59

## 2018-01-19 RX ADMIN — CHLORHEXIDINE GLUCONATE 15 ML: 1.2 RINSE ORAL at 21:39

## 2018-01-19 RX ADMIN — FUROSEMIDE 40 MG: 40 TABLET ORAL at 08:30

## 2018-01-19 RX ADMIN — INSULIN LISPRO 6 UNITS: 100 INJECTION, SOLUTION INTRAVENOUS; SUBCUTANEOUS at 16:57

## 2018-01-19 RX ADMIN — APIXABAN 5 MG: 5 TABLET, FILM COATED ORAL at 08:29

## 2018-01-19 NOTE — PLAN OF CARE
Problem: DISCHARGE PLANNING - CARE MANAGEMENT  Goal: Discharge to post-acute care or home with appropriate resources  INTERVENTIONS:  - Conduct assessment to determine patient/family and health care team treatment goals, and need for post-acute services based on payer coverage, community resources, and patient preferences, and barriers to discharge  - Address psychosocial, clinical, and financial barriers to discharge as identified in assessment in conjunction with the patient/family and health care team  - Arrange appropriate level of post-acute services according to patients   needs and preference and payer coverage in collaboration with the physician and health care team  - Communicate with and update the patient/family, physician, and health care team regarding progress on the discharge plan  - Arrange appropriate transportation to post-acute venues   Outcome: Progressing  Pt needs a Margarita Hutchins from [place] notified and info faxed to him  CM also spoke to wife and she would like cardiology to contact her 's cardiologist in [place]-Dr Rosalia Villagomez to update on condition  Phone # is [phone]  JENNIFER contacted [facility] and they will take care of this

## 2018-01-19 NOTE — OCCUPATIONAL THERAPY NOTE
Occupational Therapy Evaluation         Patient Name: Javi Holt  [id number] Date: 1/19/2018 01/19/18 6200   Note Type   Note type Eval/Treat   Restrictions/Precautions   Weight Bearing Precautions Per Order No   Pain Assessment   Pain Assessment No/denies pain   Pain Score No Pain   Home Living   Type of [address] Two level;Stairs to enter with rails   Bathroom Shower/Tub Walk-in shower   Bathroom Toilet Standard   [address]  (didn't use)   Prior Function   Level of [place] Independent with ADLs and functional mobility   Lives With Daughter   ADL Assistance Independent   IADLs Independent   Falls in the last 6 months 0   Vocational Retired   Lifestyle   Autonomy I/BADLs, IADLS and functional mobility   Reciprocal Relationships supportive dtr   Psychosocial   Psychosocial (WDL) WDL   ADL   Where Assessed Chair   Eating Assistance 7  Independent   Grooming Assistance 7  Independent   UB Bathing Assistance 5  Supervision/Setup   UB Bathing Deficit Increased time to complete;Supervision/safety   LB Bathing Assistance 5  Supervision/Setup   LB Bathing Deficit Increased time to complete   UB Dressing Assistance 7  Independent   LB Dressing Assistance 5  Supervision/Setup   LB Dressing Deficit Increased time to complete   Toileting Assistance  5  Supervision/Setup   Toileting Deficit Increased time to complete;Supervison/safety   Functional Assistance 5  Supervision/Setup   Functional Deficit Increased time to complete;Supervision/safety   Bed Mobility   Rolling R Unable to assess  (oob in chair upon arrival )   Transfers   Sit to Stand 5  Supervision   Additional items Increased time required;Armrests   Stand to Sit 5  Supervision   Additional items Assist x 1; Increased time required;Armrests   Activity Tolerance   Activity Tolerance Patient limited by fatigue   Nurse Made Aware rFance ARANA verbalized pt appropriate for OT session, outcomes discussed    RUE Assessment   RUE Assessment WFL  (4/5)   LUE Assessment   LUE Assessment WFL  (4/5)   Sensation   Light Touch No apparent deficits   Vision-Basic Assessment   Current Vision Wears glasses all the time   Cognition   Overall Cognitive Status [facility]   Arousal/Participation Alert; Cooperative   Attention Within functional limits   Orientation Level Oriented X4   Memory Within functional limits   Following Commands Follows all commands and directions without difficulty   Assessment   Limitation Decreased ADL status; Decreased UE strength;Decreased Safe judgement during ADL;Decreased endurance;Decreased self-care trans;Decreased high-level ADLs   Prognosis Good   Assessment Pt is a 60 y o  male seen for OT evaluation s/p admit to [facility] on 1/13/2018 w/ Acute combined systolic and diastolic heart failure ([address] )  Comorbidities affecting pt's functional performance at time of assessment include: HTN, DM2, A-fib, Hyperglycemia  Personal factors affecting pt at time of IE include:steps to enter environment, difficulty performing ADLS and difficulty performing IADLS   Prior to admission, pt was living home with dtr and grandchildren who lives with him  He reports being I/BADLs and IADLS  (+)drives  Upon evaluation: Pt requires supervision-sba for selfcare and ADL transfers 2* decreased activity tolerance  He only tolerates up to 30 seconds of activities before fatigue and SOB  Pt currently on 4L of o2 but wasn't at home  Functional mobility not attempted because of fatigue  Pt scoring a 60/100 on the Barthel Index indicating a functional decline in occupational performance  The following deficits impacting occupational performance include : weakness, decreased strength, decreased balance, decreased tolerance and decreased safety awareness   Pt to benefit from continued skilled OT tx while in the hospital to address deficits as defined above and maximize level of functional independence w ADL's and functional mobility  Occupational Performance areas to address include: bathing/shower, toilet hygiene, dressing, medication management, health maintenance, functional mobility, community mobility, clothing management, meal prep, money management and household maintenance  From OT standpoint, recommendation at time of d/c would be Home with family support  Goals   Patient Goals to return home   Recommendation   OT Discharge Recommendation Home with family support   OT - OK to Discharge Yes  (when medically cleared )   Barthel Index   Feeding 10   Bathing 0   Grooming Score 5   Dressing Score 10   Bladder Score 10   Bowels Score 10   Toilet Use Score 5   Transfers (Bed/Chair) Score 10   Mobility (Level Surface) Score 0   Stairs Score 0   Barthel Index Score 60   Modified [place] Scale   Modified [place] Scale 3       Occupational Therapy goals: In 7-14 days:     1- Patient will verbalize and demonstrate use of energy conservation/ deep breathing technique and work simplification skills during functional activity with no verbal cues  2-Patient will verbalize and demonstrate good body mechanics and joint protection techniques during  ADLs/ IADLs with no verbal cues  3- Patient will increase OOB/ sitting tolerance to 2-4 hours per day for increased participation in self care and leisure tasks with no s/s of exertion  4-Patient will increase standing tolerance time to 5  minutes with unilateral UE support to complete sink level ADLs@ mod I level   5- Patient will increase sitting tolerance at edge of bed to 20 minutes to complete UB ADLs @ set up assist level    6- Patient will transfer bed to Chair / toilet at I/MI  7- Patient will complete UB ADLs with set up assist  8- Patient will complete UB/LB ADLs with I  9- Patient will complete toileting hygiene with I  10-Patient/ Family  will demonstrate competency with UE Home Exercise Program

## 2018-01-19 NOTE — PLAN OF CARE
Problem: OCCUPATIONAL THERAPY ADULT  Goal: Performs self-care activities at highest level of function for planned discharge setting  See evaluation for individualized goals  See flowsheet documentation for full assessment, interventions and recommendations  Limitation: Decreased ADL status, Decreased UE strength, Decreased Safe judgement during ADL, Decreased endurance, Decreased self-care trans, Decreased high-level ADLs  Prognosis: Good  Assessment: Pt is a 60 y o  male seen for OT evaluation s/p admit to [facility] on 1/13/2018 w/ Acute combined systolic and diastolic heart failure ([facility] [address] )  Comorbidities affecting pt's functional performance at time of assessment include: HTN, DM2, A-fib, Hyperglycemia  Personal factors affecting pt at time of IE include:steps to enter environment, difficulty performing ADLS and difficulty performing IADLS   Prior to admission, pt was living home with dtr and grandchildren who lives with him  He reports being I/BADLs and IADLS  (+)drives  Upon evaluation: Pt requires supervision-sba for selfcare and ADL transfers 2* decreased activity tolerance  He only tolerates up to 30 seconds of activities before fatigue and SOB  Pt currently on 4L of o2 but wasn't at home  Functional mobility not attempted because of fatigue  Pt scoring a 60/100 on the Barthel Index indicating a functional decline in occupational performance  The following deficits impacting occupational performance include : weakness, decreased strength, decreased balance, decreased tolerance and decreased safety awareness  Pt to benefit from continued skilled OT tx while in the hospital to address deficits as defined above and maximize level of functional independence w ADL's and functional mobility   Occupational Performance areas to address include: bathing/shower, toilet hygiene, dressing, medication management, health maintenance, functional mobility, community mobility, clothing management, meal prep, money management and household maintenance  From OT standpoint, recommendation at time of d/c would be Home with family support       OT Discharge Recommendation: Home with family support  OT - OK to Discharge: Yes (when medically cleared )

## 2018-01-19 NOTE — PROGRESS NOTES
General Cardiology   Progress Note   Viktor Reyes 60 y o  male MRN: [id number]  Unit/Bed#:  Encounter: [id number]        Subjective:   Patient still states that he is short of breath with minimal exertion  HR has been on average in the 100s  Denies chest pain, leg swelling  Objective:   Vitals:  Vitals:    01/19/18 0829   BP: 142/92   Pulse: 98   Resp: 32   Temp:    SpO2: 99       Body mass index is 41 33 kg/m²  Systolic (66GLG), AOF:834 , Min:106 , CKW:198     Diastolic (64DJU), PDM:63, Min:59, Max:92      Intake/Output Summary (Last 24 hours) at 01/19/18 1043  Last data filed at 01/19/18 0825   Gross per 24 hour   Intake            335 5 ml   Output              950 ml   Net           -614 5 ml     Weight (last 2 days)     Date/Time   Weight    01/19/18 0328  (!)  136 (300 49)    01/18/18 0314  (!)  136 (300 05)    01/17/18 0309  136 (299 38)              Telemetry Review:  Atrial fibrillation, HR 100s    PHYSICAL EXAMS:  General:  Patient is not in acute distress, laying in the bed comfortably, awake, alert responding to commands  Head: Normocephalic, Atraumatic  HEENT: White sclera, pink conjunctiva,  PERRLA,pharynx benign  Neck:  Supple, no neck vein distention, carotids+2/+2 no bruits, thyromegaly, adenopathy  Respiratory: clear to P/A  Cardiovascular:  +irregularly irregular  PMI normal, S1-S2 normal, No  Murmurs, thrills, gallops, rubs   GI:  Abdomen soft nontender   No hepatosplenomegaly, adenopathy, ascites,or rebound tenderness  Extremities: No edema, normal pulses, no calf tenderness, no joint deformities, no venous disease   Integument:  No skin rashes or ulceration  Lymphatic:  No cervical or inguinal lymphadenopathy  Neurologic:  Patient is awake alert, responding to command, well-oriented to time and place and person moving all extremities      LABORATORY RESULTS:    Results from last 7 days  Lab Units 01/14/18  2318 01/14/18  1953 01/14/18  1540   TROPONIN I ng/mL 0 04 0 05* 0 05* CBC with diff:   Results from last 7 days  Lab Units 01/19/18 0317 01/18/18  0537 01/17/18  0547 01/16/18  0443   WBC Thousand/uL 7 91 6 86 6 34 7 53   HEMOGLOBIN g/dL 12 8 12 6 12 0 12 8   HEMATOCRIT % 38 9 37 5 36 3* 39 0   MCV fL 89 88 88 89   PLATELETS Thousands/uL 230 223 225 244   MCH pg 29 3 29 6 29 1 29 2   MCHC g/dL 32 9 33 6 33 1 32 8   RDW % 14 8 14 9 14 7 14 7   MPV fL 10 2 10 4 10 2 10 2   NRBC AUTO /100 WBCs  --  0 0 0       CMP:  Results from last 7 days  Lab Units 01/19/18 0317 01/18/18  0537 01/17/18  0551  01/13/18  2349   SODIUM mmol/L 135* 137 137  < > 138   POTASSIUM mmol/L 3 5 3 4* 3 4*  < > 4 0   CHLORIDE mmol/L 100 101 102  < > 101   CO2 mmol/L 27 26 25  < > 23   ANION GAP mmol/L 8 10 10  < > 14*   BUN mg/dL 26* 26* 29*  < > 18   CREATININE mg/dL 1 09 1 12 1 07  < > 1 35*   GLUCOSE RANDOM mg/dL 260* 321* 287*  < > 424*   GLUCOSE, ISTAT   --   --   --   < >  --    CALCIUM mg/dL 8 6 8 9 8 8  < > 9 3   AST U/L  --   --   --   --  46*   ALT U/L  --   --   --   --  106*   ALK PHOS U/L  --   --   --   --  100   TOTAL PROTEIN g/dL  --   --   --   --  7 8   ALBUMIN g/dL  --   --   --   --  4 2   BILIRUBIN TOTAL mg/dL  --   --   --   --  1 00   EGFR ml/min/1 73sq m 72 70 73  < > 55   < > = values in this interval not displayed      BMP:  Results from last 7 days  Lab Units 01/19/18 0317 01/18/18  0537 01/17/18  0551   SODIUM mmol/L 135* 137 137   POTASSIUM mmol/L 3 5 3 4* 3 4*   CHLORIDE mmol/L 100 101 102   CO2 mmol/L 27 26 25   BUN mg/dL 26* 26* 29*   CREATININE mg/dL 1 09 1 12 1 07   GLUCOSE RANDOM mg/dL 260* 321* 287*   CALCIUM mg/dL 8 6 8 9 8 8           Results from last 7 days  Lab Units 01/14/18  1218   NT-PRO BNP pg/mL 4,171*        Results from last 7 days  Lab Units 01/15/18  0305   MAGNESIUM mg/dL 2 0       Results from last 7 days  Lab Units 01/14/18  0246   HEMOGLOBIN A1C % 9 4*       Results from last 7 days  Lab Units 01/13/18  2349   TSH 3RD GENERATON uIU/mL 3 661       Results from last 7 days  Lab Units 01/18/18  0537 01/14/18  1219   INR  1 06 1 13       Lipid Profile:   No results found for: CHOL  No results found for: HDL  No results found for: LDLCALC  No results found for: TRIG    Cardiac testing:   No results found for this or any previous visit  No results found for this or any previous visit  No results found for this or any previous visit  No procedure found  No results found for this or any previous visit  Meds/Allergies   all current active meds have been reviewed and current meds:   Current Facility-Administered Medications   Medication Dose Route Frequency    acetaminophen (TYLENOL) tablet 650 mg  650 mg Oral Q6H PRN    apixaban (ELIQUIS) tablet 5 mg  5 mg Oral BID    chlorhexidine (PERIDEX) 0 12 % oral rinse 15 mL  15 mL Swish & Spit Q12H [facility]    [START ON 1/20/2018] digoxin (LANOXIN) tablet 125 mcg  125 mcg Oral Daily    digoxin (LANOXIN) tablet 500 mcg  500 mcg Oral Once    furosemide (LASIX) tablet 40 mg  40 mg Oral Daily    insulin glargine (LANTUS) subcutaneous injection 60 Units  60 Units Subcutaneous HS    insulin lispro (HumaLOG) 100 units/mL subcutaneous injection 1-5 Units  1-5 Units Subcutaneous HS    insulin lispro (HumaLOG) 100 units/mL subcutaneous injection 2-12 Units  2-12 Units Subcutaneous TID AC    levalbuterol (XOPENEX) inhalation solution 1 25 mg  1 25 mg Nebulization Q6H PRN    melatonin tablet 6 mg  6 mg Oral HS    metoprolol tartrate (LOPRESSOR) tablet 100 mg  100 mg Oral Q12H [facility]    senna (SENOKOT) tablet 8 6 mg  1 tablet Oral HS    sodium chloride 0 9 % inhalation solution 3 mL  3 mL Nebulization Q6H PRN     Prescriptions Prior to Admission   Medication    amLODIPine (NORVASC) 5 mg tablet    insulin glargine (LANTUS) 100 units/mL subcutaneous injection    losartan (COZAAR) 100 MG tablet    metFORMIN (GLUMETZA) 1000 MG (MOD) 24 hr tablet    simvastatin (ZOCOR) 40 mg tablet       Assessment/Plan:  1    Atrial fibrillation:  HR has been in the 90s to 100s  Patient is still in atrial fibrillation  Lopressor was recently increased, will start digoxin  Will give digoxin 500 mcg today  Starting tomorrow, we will give digoxin 125 mcg daily  Continue Eliquis 5 mg for anticoagulation  Encouraged ambulation  Given low EF, will need life vest prior to discharge  2   NSTEMI type 2 likely secondary to AFib with RVR and Congestive heart failure: Troponins minimally elevated  Echo shows EF 30% and severe diffuse hypokinesis  Continue all medications, including Lopressor  Will start digoxin as stated above  Continue to monitor on telemetry      3  Acute systolic Congestive heart failure:  Euvolemic on exam today  Echo shows EF 30% and severe diffuse hypokinesis  Continue lopressor, PO lasix  Will start low dose lisinopril 5 mg, and digoxin as above  Low-salt diet, monitor daily weights, I/O  Life Vest prior to d/c       4  Hypertension: BP stable  Will start digoxin 500 mcg today, then 125 mcg daily starting tomorrow  Will start lisinopril 5 mg  Encouraged ambulation  ** Please Note: Dragon 360 Dictation voice to text software may have been used in the creation of this document   **

## 2018-01-19 NOTE — PROGRESS NOTES
[facility] Internal Medicine Progress Note  Patient: Kevin Chu 60 y o  male   MRN: [id number]  PCP: No primary care provider on file  Unit/Bed#:  Encounter: [id number]  Date Of Visit: 01/19/18    Assessment/Plan:    Principal Problem:    Acute combined systolic and diastolic heart failure (HCC)  Active Problems:    HTN (hypertension)    Diabetes mellitus type 2 in obese (HCC)    Atrial fibrillation with RVR ([facility] [address] )    Present on Admission:   HTN (hypertension)   Diabetes mellitus type 2 in obese ([facility] [address] )   Atrial fibrillation with RVR (HCC)   Acute combined systolic and diastolic heart failure (HCC)      · Acute combined systolic and diastolic heart failure with underlying nonischemic cardiomyopathy  Patient was found to have distal LAD lesion on cardiac catheterization  Ejection fraction on the echocardiogram found to be about 30%  Patient is still significantly short of breath and symptomatic  His heart rate has been uncontrolled at times  Digoxin is being added in addition to his other rate controlling medications  Continue Eliquis for anticoagulation  Discussed with case management as I saw in Cardiology note about patient going to have a life vest   I did talked to Dr Yeison Fiore regarding this  He would let me know about this later today  · AFib with RVR-likely causing cardiomyopathy  Continue treatment as above  · Diabetes mellitus insulin requiring uncontrolled  Hemoglobin A1c is 9 4  As per patient as I talked to him and his daughter yesterday, his hemoglobin A1c in the recent past was above 11  He needs to work on his diet and exercise  · Essential hypertension  His blood pressure has been on the lower side  Agree with digoxin in addition to other medications as he could tolerate  Note:  Patient would need oxygen saturation studies prior to discharge        VTE Pharmacologic Prophylaxis:   Pharmacologic: Apixaban (Eliquis)  Mechanical VTE Prophylaxis in Place: Yes    Patient Centered Rounds: I have performed bedside rounds with nursing staff today  Discussions with Specialists or Other Care Team Provider:  Yes    Education and Discussions with Family / Patient:  Yes    Time Spent for Care: 35+ minutes  More than 50% of total time spent on counseling and coordination of care as described above  Current Length of Stay: 5 day(s)    Current Patient Status: Inpatient   Certification Statement: The patient will continue to require additional inpatient hospital stay due to The patient will continue to require additional inpatient hospital stay due to Control of his heart rate/atrial fibrillation/treatment of heart failure/hypoxia    Discharge Plan:  Home when stable    Code Status: Level 1 - Full Code      Subjective:   He still feels pretty short of breath  With minimal activity and talking, he would become more short of breath  Heart rate has been uncontrolled  Also blood pressure has been running on the lower side  Denies any chest pain  Denies any nausea or vomiting  Denies any fever or chills    Objective:     Vitals:   Temp (24hrs), Av 6 °F (36 4 °C), Min:97 3 °F (36 3 °C), Max:97 9 °F (36 6 °C)    HR:  [] 92  Resp:  [21-32] 31  BP: (106-142)/(66-92) 108/81  SpO2:  [95 %-99 %] 99 %  Body mass index is 41 33 kg/m²  Input and Output Summary (last 24 hours): Intake/Output Summary (Last 24 hours) at 18 1332  Last data filed at 18 0825   Gross per 24 hour   Intake            335 5 ml   Output              950 ml   Net           -614 5 ml           Physical Exam:     Vital signs are reviewed as above  Constitutional:  Patient up in the chair  He is still short of breath although he appears short of breath to me as compared to yesterday  He would become more short of breath while talking    Eyes: EOM grossly intact  Conjunctivae slightly pale  Patient has anicteric sclera  HENT: Oropharynx are dry  He has oxygen on    Oropharynx are also crowded  Neck: Neck is obese and supple  Cardiac: I did not hear any rubs or gallop  Patient has irregularly irregular  rhythm  Respiratory:  Short of breath as above  Air entry in general is improving  GI: Abdomen is obese and soft  It is grossly nontender  Bowel sounds are adequate  I was not able to appreciate any hepatosplenomegaly  Neurologic:  Patient is awake and alert  Neurological examination is grossly intact  No obvious focal neurological deficit noticed  Skin: Skin is warm and dry  Psychiatric:  Anxious  Musculoskeletal   Moving his arms well  Extremities: Patient has 1+ bilateral lower extremities edema      Additional Data:     Labs:      Results from last 7 days  Lab Units 01/19/18 0317 01/18/18  0537   WBC Thousand/uL 7 91 6 86   HEMOGLOBIN g/dL 12 8 12 6   HEMATOCRIT % 38 9 37 5   PLATELETS Thousands/uL 230 223   NEUTROS PCT %  --  76*   LYMPHS PCT %  --  13*   MONOS PCT %  --  9   EOS PCT %  --  2       Results from last 7 days  Lab Units 01/19/18 0317 01/13/18  2349   SODIUM mmol/L 135*  < > 138   POTASSIUM mmol/L 3 5  < > 4 0   CHLORIDE mmol/L 100  < > 101   CO2 mmol/L 27  < > 23   BUN mg/dL 26*  < > 18   CREATININE mg/dL 1 09  < > 1 35*   CALCIUM mg/dL 8 6  < > 9 3   TOTAL PROTEIN g/dL  --   --  7 8   BILIRUBIN TOTAL mg/dL  --   --  1 00   ALK PHOS U/L  --   --  100   ALT U/L  --   --  106*   AST U/L  --   --  46*   GLUCOSE RANDOM mg/dL 260*  < > 424*   GLUCOSE, ISTAT   --   < >  --    < > = values in this interval not displayed  Results from last 7 days  Lab Units 01/18/18  0537   INR  1 06       * I Have Reviewed All Lab Data Listed Above  * Additional Pertinent Lab Tests Reviewed:  All Labs Within Last 24 Hours Reviewed      Recent Cultures (last 7 days):       Results from last 7 days  Lab Units 01/14/18  0538   INFLUENZA A PCR  None Detected   INFLUENZA B PCR  None Detected   RSV PCR  None Detected       Last 24 Hours Medication List:     apixaban 5 mg Oral BID chlorhexidine 15 mL Swish & Spit Q12H [address]   [START ON 1/20/2018] digoxin 125 mcg Oral Daily   furosemide 40 mg Oral Daily   insulin glargine 60 Units Subcutaneous HS   insulin lispro 1-5 Units Subcutaneous HS   insulin lispro 2-12 Units Subcutaneous TID AC   lisinopril 5 mg Oral Daily   melatonin 6 mg Oral HS   metoprolol tartrate 100 mg Oral Q12H KENA   senna 1 tablet Oral HS        Today, Patient Was Seen By: Allison Christie MD    ** Please Note: Dragon 360 Dictation voice to text software may have been used in the creation of this document   **

## 2018-01-19 NOTE — SOCIAL WORK
Pt needs a Calvin Hernandezthorn from [email or web address] and info faxed to him  CM also spoke to wife and she would like cardiology to contact her 's cardiologist in [place]-Dr Crispin Johnson to update on condition  Phone # is [phone]  CM contacted [facility] and they will take care of this

## 2018-01-20 PROBLEM — E66.01 MORBID OBESITY WITH BMI OF 40.0-44.9, ADULT (HCC): Status: ACTIVE | Noted: 2018-01-20

## 2018-01-20 LAB
ALBUMIN SERPL BCP-MCNC: 3.3 G/DL (ref 3.5–5)
ALP SERPL-CCNC: 112 U/L (ref 46–116)
ALT SERPL W P-5'-P-CCNC: 195 U/L (ref 12–78)
ANION GAP SERPL CALCULATED.3IONS-SCNC: 9 MMOL/L (ref 4–13)
AST SERPL W P-5'-P-CCNC: 46 U/L (ref 5–45)
BILIRUB SERPL-MCNC: 0.5 MG/DL (ref 0.2–1)
BUN SERPL-MCNC: 26 MG/DL (ref 5–25)
CALCIUM SERPL-MCNC: 8.6 MG/DL (ref 8.3–10.1)
CHLORIDE SERPL-SCNC: 103 MMOL/L (ref 100–108)
CHOLEST SERPL-MCNC: 109 MG/DL (ref 50–200)
CO2 SERPL-SCNC: 28 MMOL/L (ref 21–32)
CREAT SERPL-MCNC: 1.02 MG/DL (ref 0.6–1.3)
ERYTHROCYTE [DISTWIDTH] IN BLOOD BY AUTOMATED COUNT: 15 % (ref 11.6–15.1)
GFR SERPL CREATININE-BSD FRML MDRD: 78 ML/MIN/1.73SQ M
GLUCOSE SERPL-MCNC: 105 MG/DL (ref 65–140)
GLUCOSE SERPL-MCNC: 182 MG/DL (ref 65–140)
GLUCOSE SERPL-MCNC: 236 MG/DL (ref 65–140)
GLUCOSE SERPL-MCNC: 269 MG/DL (ref 65–140)
GLUCOSE SERPL-MCNC: 273 MG/DL (ref 65–140)
HCT VFR BLD AUTO: 37.3 % (ref 36.5–49.3)
HDLC SERPL-MCNC: 32 MG/DL (ref 40–60)
HGB BLD-MCNC: 12.2 G/DL (ref 12–17)
LDLC SERPL CALC-MCNC: 58 MG/DL (ref 0–100)
MAGNESIUM SERPL-MCNC: 1.6 MG/DL (ref 1.6–2.6)
MCH RBC QN AUTO: 29.5 PG (ref 26.8–34.3)
MCHC RBC AUTO-ENTMCNC: 32.7 G/DL (ref 31.4–37.4)
MCV RBC AUTO: 90 FL (ref 82–98)
NT-PROBNP SERPL-MCNC: 4619 PG/ML
PLATELET # BLD AUTO: 208 THOUSANDS/UL (ref 149–390)
PMV BLD AUTO: 10.5 FL (ref 8.9–12.7)
POTASSIUM SERPL-SCNC: 3.8 MMOL/L (ref 3.5–5.3)
PROT SERPL-MCNC: 6.6 G/DL (ref 6.4–8.2)
RBC # BLD AUTO: 4.13 MILLION/UL (ref 3.88–5.62)
SODIUM SERPL-SCNC: 140 MMOL/L (ref 136–145)
TRIGL SERPL-MCNC: 96 MG/DL
WBC # BLD AUTO: 7.52 THOUSAND/UL (ref 4.31–10.16)

## 2018-01-20 PROCEDURE — 80053 COMPREHEN METABOLIC PANEL: CPT | Performed by: INTERNAL MEDICINE

## 2018-01-20 PROCEDURE — 80061 LIPID PANEL: CPT | Performed by: INTERNAL MEDICINE

## 2018-01-20 PROCEDURE — 85027 COMPLETE CBC AUTOMATED: CPT | Performed by: INTERNAL MEDICINE

## 2018-01-20 PROCEDURE — 82948 REAGENT STRIP/BLOOD GLUCOSE: CPT

## 2018-01-20 PROCEDURE — 83735 ASSAY OF MAGNESIUM: CPT | Performed by: INTERNAL MEDICINE

## 2018-01-20 PROCEDURE — 83880 ASSAY OF NATRIURETIC PEPTIDE: CPT | Performed by: INTERNAL MEDICINE

## 2018-01-20 RX ORDER — INSULIN GLARGINE 100 [IU]/ML
35 INJECTION, SOLUTION SUBCUTANEOUS EVERY 12 HOURS SCHEDULED
Status: DISCONTINUED | OUTPATIENT
Start: 2018-01-20 | End: 2018-01-23

## 2018-01-20 RX ORDER — INSULIN GLARGINE 100 [IU]/ML
40 INJECTION, SOLUTION SUBCUTANEOUS EVERY 12 HOURS SCHEDULED
Status: DISCONTINUED | OUTPATIENT
Start: 2018-01-20 | End: 2018-01-20

## 2018-01-20 RX ORDER — ALBUTEROL SULFATE 2.5 MG/3ML
2.5 SOLUTION RESPIRATORY (INHALATION) EVERY 4 HOURS PRN
Status: DISCONTINUED | OUTPATIENT
Start: 2018-01-20 | End: 2018-01-26 | Stop reason: HOSPADM

## 2018-01-20 RX ADMIN — CHLORHEXIDINE GLUCONATE 15 ML: 1.2 RINSE ORAL at 21:58

## 2018-01-20 RX ADMIN — INSULIN GLARGINE 35 UNITS: 100 INJECTION, SOLUTION SUBCUTANEOUS at 10:39

## 2018-01-20 RX ADMIN — APIXABAN 5 MG: 5 TABLET, FILM COATED ORAL at 16:47

## 2018-01-20 RX ADMIN — INSULIN GLARGINE 35 UNITS: 100 INJECTION, SOLUTION SUBCUTANEOUS at 21:57

## 2018-01-20 RX ADMIN — METOPROLOL TARTRATE 100 MG: 50 TABLET ORAL at 21:54

## 2018-01-20 RX ADMIN — METOPROLOL TARTRATE 100 MG: 50 TABLET ORAL at 08:25

## 2018-01-20 RX ADMIN — FUROSEMIDE 40 MG: 40 TABLET ORAL at 08:24

## 2018-01-20 RX ADMIN — MELATONIN TAB 3 MG 6 MG: 3 TAB at 21:54

## 2018-01-20 RX ADMIN — DIGOXIN 125 MCG: 0.12 TABLET ORAL at 10:40

## 2018-01-20 RX ADMIN — CHLORHEXIDINE GLUCONATE 15 ML: 1.2 RINSE ORAL at 08:24

## 2018-01-20 RX ADMIN — APIXABAN 5 MG: 5 TABLET, FILM COATED ORAL at 08:25

## 2018-01-20 RX ADMIN — INSULIN LISPRO 2 UNITS: 100 INJECTION, SOLUTION INTRAVENOUS; SUBCUTANEOUS at 09:55

## 2018-01-20 RX ADMIN — INSULIN LISPRO 4 UNITS: 100 INJECTION, SOLUTION INTRAVENOUS; SUBCUTANEOUS at 17:56

## 2018-01-20 RX ADMIN — INSULIN LISPRO 6 UNITS: 100 INJECTION, SOLUTION INTRAVENOUS; SUBCUTANEOUS at 14:36

## 2018-01-20 NOTE — PROGRESS NOTES
Progress Note - Cardiology     Basilia Gomez 60 y o  male MRN: [id number]  Unit/Bed#: -01 Encounter: [id number]      Subjective:   Patient states his breathing is stable  He denies chest pain, palpitations, dizziness, presyncope/syncope      Objective:   Vitals:  Vitals:    01/20/18 1100   BP: 154/78   Pulse: 96   Resp: 22   Temp: (!) 97 3 °F (36 3 °C)   SpO2: 96%       Body mass index is 41 57 kg/m²  Systolic (61BAB), AYS:483 , Min:95 , YUP:418     Diastolic (25JCB), OPF:88, Min:69, Max:94      Intake/Output Summary (Last 24 hours) at 01/20/18 1136  Last data filed at 01/20/18 1000   Gross per 24 hour   Intake             1340 ml   Output             1025 ml   Net              315 ml     Weight (last 2 days)     Date/Time   Weight    01/20/18 0531  (!)  137 (302 25)    01/19/18 0328  (!)  136 (300 49)    01/18/18 0314  (!)  136 (300 05)              PHYSICAL EXAM:  General: Patient is not in acute distress  Laying comfortably in the bed  Awake, alert, responding to commands  Head: Normocephalic  Atraumatic  HEENT: Both pupils normal sized, round and reactive to light  Nonicteric  Neck: JVP not raised  Trachea central  No thyromegaly  Respiratory:  Decreased breath sounds  Cardiovascular:  Irregularly irregular S1 and S2 normal  No murmur, rub or gallop  GI: Abdomen soft, nontender  No guarding or rigidity  Neurologic: Patient is awake, alert, responding to command   Moving all extremities  Integumentary:  No skin rash  Extremities: No clubbing, cyanosis or edema    LABORATORY RESULTS:    Results from last 7 days  Lab Units 01/14/18  2318 01/14/18  1953 01/14/18  1540   TROPONIN I ng/mL 0 04 0 05* 0 05*     CBC with diff:   Results from last 7 days  Lab Units 01/20/18  0441 01/19/18  0317 01/18/18  0537 01/17/18  0547 01/16/18  0443   WBC Thousand/uL 7 52 7 91 6 86 6 34 7 53   HEMOGLOBIN g/dL 12 2 12 8 12 6 12 0 12 8   HEMATOCRIT % 37 3 38 9 37 5 36 3* 39 0   MCV fL 90 89 88 88 89   PLATELETS Thousands/uL 208 230 223 225 244   MCH pg 29 5 29 3 29 6 29 1 29 2   MCHC g/dL 32 7 32 9 33 6 33 1 32 8   RDW % 15 0 14 8 14 9 14 7 14 7   MPV fL 10 5 10 2 10 4 10 2 10 2   NRBC AUTO /100 WBCs  --   --  0 0 0       CMP:  Results from last 7 days  Lab Units 01/20/18 0441 01/19/18 0317 01/18/18  0537  01/13/18  2349   SODIUM mmol/L 140 135* 137  < > 138   POTASSIUM mmol/L 3 8 3 5 3 4*  < > 4 0   CHLORIDE mmol/L 103 100 101  < > 101   CO2 mmol/L 28 27 26  < > 23   ANION GAP mmol/L 9 8 10  < > 14*   BUN mg/dL 26* 26* 26*  < > 18   CREATININE mg/dL 1 02 1 09 1 12  < > 1 35*   GLUCOSE RANDOM mg/dL 273* 260* 321*  < > 424*   GLUCOSE, ISTAT   --   --   --   < >  --    CALCIUM mg/dL 8 6 8 6 8 9  < > 9 3   AST U/L 46*  --   --   --  46*   ALT U/L 195*  --   --   --  106*   ALK PHOS U/L 112  --   --   --  100   TOTAL PROTEIN g/dL 6 6  --   --   --  7 8   ALBUMIN g/dL 3 3*  --   --   --  4 2   BILIRUBIN TOTAL mg/dL 0 50  --   --   --  1 00   EGFR ml/min/1 73sq m 78 72 70  < > 55   < > = values in this interval not displayed      BMP:  Results from last 7 days  Lab Units 01/20/18 0441 01/19/18 0317 01/18/18  0537   SODIUM mmol/L 140 135* 137   POTASSIUM mmol/L 3 8 3 5 3 4*   CHLORIDE mmol/L 103 100 101   CO2 mmol/L 28 27 26   BUN mg/dL 26* 26* 26*   CREATININE mg/dL 1 02 1 09 1 12   GLUCOSE RANDOM mg/dL 273* 260* 321*   CALCIUM mg/dL 8 6 8 6 8 9         Results from last 7 days  Lab Units 01/20/18 0441 01/14/18  1218   NT-PRO BNP pg/mL 4,619* 4,171*          Results from last 7 days  Lab Units 01/20/18  0441 01/15/18  0305   MAGNESIUM mg/dL 1 6 2 0         Results from last 7 days  Lab Units 01/14/18  0246   HEMOGLOBIN A1C % 9 4*         Results from last 7 days  Lab Units 01/13/18  2349   TSH 3RD GENERATON uIU/mL 3 661         Results from last 7 days  Lab Units 01/18/18  0537 01/14/18  1219   INR  1 06 1 13       Lipid Profile:   Lab Results   Component Value Date    CHOL 109 01/20/2018     Lab Results   Component Value Date    HDL 32 (L) 01/20/2018     Lab Results   Component Value Date    LDLCALC 58 01/20/2018     Lab Results   Component Value Date    TRIG 96 01/20/2018             Meds/Allergies   all current active meds have been reviewed  Prescriptions Prior to Admission   Medication    amLODIPine (NORVASC) 5 mg tablet    insulin glargine (LANTUS) 100 units/mL subcutaneous injection    losartan (COZAAR) 100 MG tablet    metFORMIN (GLUMETZA) 1000 MG (MOD) 24 hr tablet    simvastatin (ZOCOR) 40 mg tablet              Assessment and Plan:  Atrial fibrillation   -Started on digoxin yesterday, also on lopressor  -On eliquis for anticoagulation    NSTEMI type 2  -secondary to AF with RVR and acute CHF    Acute systolic CHF/nonischemic cardiomyopathy  -EF 30% with severe diffuse hypokinesis  -on lopressor (transition to long acting when heart rate prove to be stable),  lisinopril   -patient had cardiac catheterization which revealed nonobstructive CAD  Cardiomyopathy likely nonischemic  Initially was though that cardiomyopathy was tachy mediated, however discussion was held with primary cardiologist, patient was very recently in normal sinus rhythm  -congestive heart failure exacerbation likely what resulted in atrial fibrillation  -patient will need to follow up with primary cardiologist for serial echocardiograms of outpatient to determine if LVEF normalizes    Hypertension- BP soft, but overall stable        ** Please Note: Dragon 360 Dictation voice to text software may have been used in the creation of this document   **

## 2018-01-20 NOTE — PROGRESS NOTES
[facility] Progress Note  Patient: Mayela Ferraro 60 y o  male   MRN: [id number]  PCP: No primary care provider on file  Unit/Bed#: -01 Encounter: [id number]  Date Of Visit: 01/20/18    Assessment/Plan:    Principal Problem:    Acute combined systolic and diastolic heart failure (HCC)  Active Problems:    HTN (hypertension)    Diabetes mellitus type 2 in obese (HCC)    Atrial fibrillation with RVR (HCC)    Morbid obesity with BMI of 40 0-44 9, adult ([facility] )    Present on Admission:   HTN (hypertension)   Diabetes mellitus type 2 in obese ([facility] )   Atrial fibrillation with RVR (HCC)   Acute combined systolic and diastolic heart failure (HCC)      · Acute hypoxic respiratory failure secondary to combined systolic and diastolic heart failure exacerbation  Ejection fraction about 30%  Nonischemic cardiomyopathy  He has distal LAD lesion  Also has had uncontrolled atrial fibrillation which likely caused cardiomyopathy  Medications are being adjusted  Heart rate is better controlled with current treatment which includes digoxin  Would continue with ACE-inhibitor and other medical management  Had lengthy discussion with patient and daughter  Patient is expecting to get over this quick as he did not have this trouble before  He would also need oxygen saturation studies prior to discharge  In addition, he is morbidly obese and this does not help either  I am not sure if he has underlying sleep apnea  · Diabetes mellitus type 2 uncontrolled  I changed his insulin to twice daily to better control his sugars as he is on significant amount of Lantus  With his obesity, absorption could also be an issue  · Hypertension  Blood pressure on the lower side likely secondary to being on different antihypertensive medications/ACE-inhibitor/cardiomyopathy  · Morbid obesity  Major comorbid illness/issue    He needs to work on his diet and exercise to shed few lbs off      VTE Pharmacologic Prophylaxis:   Pharmacologic: Apixaban (Eliquis)  Mechanical VTE Prophylaxis in Place: Yes    Patient Centered Rounds: I have performed bedside rounds with nursing staff today  Discussions with Specialists or Other Care Team Provider:  Yes    Education and Discussions with Family / Patient:  Yes        Current Length of Stay: 6 day(s)    Current Patient Status: Inpatient   Certification Statement: The patient will continue to require additional inpatient hospital stay due to Heart failure/AFib/med adjustment    Discharge Plan:  Home when stable    Code Status: Level 1 - Full Code      Subjective:   Feels better although still having difficulty breathing with activity  Objective:     Vitals:   Temp (24hrs), Av 9 °F (36 6 °C), Min:97 3 °F (36 3 °C), Max:98 6 °F (37 °C)    HR:  [] 96  Resp:  [14-30] 22  BP: ()/(69-94) 154/78  SpO2:  [93 %-99 %] 96 %  Body mass index is 41 57 kg/m²  Input and Output Summary (last 24 hours): Intake/Output Summary (Last 24 hours) at 18 1307  Last data filed at 18 1230   Gross per 24 hour   Intake              690 ml   Output             1325 ml   Net             -635 ml           Physical Exam:     Vital signs are reviewed as above  Constitutional:  Morbidly obese male who is in the bed  He appears less short of breath to me  He is not as short of breath while talking as compared to yesterday  Eyes: EOM grossly intact  HENT: Oropharynx are crowded and slightly dry  Neck: Neck is obese and supple  Cardiac: I did not hear any rubs or gallop  Patient has irregularly irregular rhythm, however, heart rate is better   Respiratory:  On oxygen  Appears less short of breath  Improving air entry bilaterally   GI: Abdomen is morbidly obese and soft  It is grossly nontender  Bowel sounds are adequate  I was not able to appreciate any hepatosplenomegaly  Neurologic:  Patient is awake and alert  Neurological examination is grossly intact   No obvious focal neurological deficit noticed  Skin: Skin is warm and dry  Psychiatric: Mood and affect are pleasant  Musculoskeletal  Patient moving all extremities   Has chronic arthritic joints   Extremities: Patient has improving edema of his legs      Additional Data:     Labs:      Results from last 7 days  Lab Units 01/20/18  0441  01/18/18  0537   WBC Thousand/uL 7 52  < > 6 86   HEMOGLOBIN g/dL 12 2  < > 12 6   HEMATOCRIT % 37 3  < > 37 5   PLATELETS Thousands/uL 208  < > 223   NEUTROS PCT %  --   --  76*   LYMPHS PCT %  --   --  13*   MONOS PCT %  --   --  9   EOS PCT %  --   --  2   < > = values in this interval not displayed  Results from last 7 days  Lab Units 01/20/18  0441   SODIUM mmol/L 140   POTASSIUM mmol/L 3 8   CHLORIDE mmol/L 103   CO2 mmol/L 28   BUN mg/dL 26*   CREATININE mg/dL 1 02   CALCIUM mg/dL 8 6   TOTAL PROTEIN g/dL 6 6   BILIRUBIN TOTAL mg/dL 0 50   ALK PHOS U/L 112   ALT U/L 195*   AST U/L 46*   GLUCOSE RANDOM mg/dL 273*       Results from last 7 days  Lab Units 01/18/18  0537   INR  1 06       * I Have Reviewed All Lab Data Listed Above  * Additional Pertinent Lab Tests Reviewed:  All Labs Within Last 24 Hours Reviewed    Recent Cultures (last 7 days):       Results from last 7 days  Lab Units 01/14/18  0538   INFLUENZA A PCR  None Detected   INFLUENZA B PCR  None Detected   RSV PCR  None Detected       Last 24 Hours Medication List:     apixaban 5 mg Oral BID   chlorhexidine 15 mL [employer] Q12H [address]   digoxin 125 mcg Oral Daily   furosemide 40 mg Oral Daily   insulin glargine 35 Units Subcutaneous Q12H KENA   insulin lispro 1-5 Units Subcutaneous HS   insulin lispro 2-12 Units Subcutaneous TID AC   lisinopril 5 mg Oral Daily   melatonin 6 mg Oral HS   metoprolol tartrate 100 mg Oral Q12H KENA   senna 1 tablet Oral HS        Today, Patient Was Seen By: Tiana Woody MD    ** Please Note: Dragon 360 Dictation voice to text software may have been used in the creation of this document   **

## 2018-01-21 LAB
GLUCOSE SERPL-MCNC: 135 MG/DL (ref 65–140)
GLUCOSE SERPL-MCNC: 221 MG/DL (ref 65–140)
GLUCOSE SERPL-MCNC: 240 MG/DL (ref 65–140)
GLUCOSE SERPL-MCNC: 344 MG/DL (ref 65–140)

## 2018-01-21 PROCEDURE — 5A2204Z RESTORATION OF CARDIAC RHYTHM, SINGLE: ICD-10-PCS | Performed by: INTERNAL MEDICINE

## 2018-01-21 PROCEDURE — 86705 HEP B CORE ANTIBODY IGM: CPT | Performed by: INTERNAL MEDICINE

## 2018-01-21 PROCEDURE — 86803 HEPATITIS C AB TEST: CPT | Performed by: INTERNAL MEDICINE

## 2018-01-21 PROCEDURE — 82948 REAGENT STRIP/BLOOD GLUCOSE: CPT

## 2018-01-21 PROCEDURE — 86704 HEP B CORE ANTIBODY TOTAL: CPT | Performed by: INTERNAL MEDICINE

## 2018-01-21 PROCEDURE — 80074 ACUTE HEPATITIS PANEL: CPT | Performed by: INTERNAL MEDICINE

## 2018-01-21 PROCEDURE — 87340 HEPATITIS B SURFACE AG IA: CPT | Performed by: INTERNAL MEDICINE

## 2018-01-21 RX ADMIN — INSULIN GLARGINE 35 UNITS: 100 INJECTION, SOLUTION SUBCUTANEOUS at 08:23

## 2018-01-21 RX ADMIN — METOPROLOL TARTRATE 100 MG: 50 TABLET ORAL at 08:22

## 2018-01-21 RX ADMIN — DIGOXIN 125 MCG: 0.12 TABLET ORAL at 08:22

## 2018-01-21 RX ADMIN — APIXABAN 5 MG: 5 TABLET, FILM COATED ORAL at 08:22

## 2018-01-21 RX ADMIN — CHLORHEXIDINE GLUCONATE 15 ML: 1.2 RINSE ORAL at 08:23

## 2018-01-21 RX ADMIN — INSULIN LISPRO 4 UNITS: 100 INJECTION, SOLUTION INTRAVENOUS; SUBCUTANEOUS at 22:01

## 2018-01-21 RX ADMIN — INSULIN GLARGINE 35 UNITS: 100 INJECTION, SOLUTION SUBCUTANEOUS at 22:00

## 2018-01-21 RX ADMIN — INSULIN LISPRO 4 UNITS: 100 INJECTION, SOLUTION INTRAVENOUS; SUBCUTANEOUS at 12:37

## 2018-01-21 RX ADMIN — APIXABAN 5 MG: 5 TABLET, FILM COATED ORAL at 17:36

## 2018-01-21 RX ADMIN — METOPROLOL TARTRATE 100 MG: 50 TABLET ORAL at 22:08

## 2018-01-21 RX ADMIN — INSULIN LISPRO 4 UNITS: 100 INJECTION, SOLUTION INTRAVENOUS; SUBCUTANEOUS at 15:34

## 2018-01-21 RX ADMIN — MELATONIN TAB 3 MG 6 MG: 3 TAB at 22:03

## 2018-01-21 NOTE — PLAN OF CARE
Problem: Potential for Falls  Goal: Patient will remain free of falls  INTERVENTIONS:  - Assess patient frequently for physical needs  -  Identify cognitive and physical deficits and behaviors that affect risk of falls    -  [place] fall precautions as indicated by assessment   - Educate patient/family on patient safety including physical limitations  - Instruct patient to call for assistance with activity based on assessment  - Modify environment to reduce risk of injury  - Consider OT/PT consult to assist with strengthening/mobility   Outcome: Progressing      Problem: Prexisting or High Potential for Compromised Skin Integrity  Goal: Skin integrity is maintained or improved  INTERVENTIONS:  - Identify patients at risk for skin breakdown  - Assess and monitor skin integrity  - Assess and monitor nutrition and hydration status  - Monitor labs (i e  albumin)  - Assess for incontinence   - Turn and reposition patient  - Assist with mobility/ambulation  - Relieve pressure over bony prominences  - Avoid friction and shearing  - Provide appropriate hygiene as needed including keeping skin clean and dry  - Evaluate need for skin moisturizer/barrier cream  - Collaborate with interdisciplinary team (i e  Nutrition, Rehabilitation, etc )   - Patient/family teaching   Outcome: Progressing      Problem: PAIN - ADULT  Goal: Verbalizes/displays adequate comfort level or baseline comfort level  Interventions:  - Encourage patient to monitor pain and request assistance  - Assess pain using appropriate pain scale  - Administer analgesics based on type and severity of pain and evaluate response  - Implement non-pharmacological measures as appropriate and evaluate response  - Consider cultural and social influences on pain and pain management  - Notify physician/advanced practitioner if interventions unsuccessful or patient reports new pain   Outcome: Progressing      Problem: INFECTION - ADULT  Goal: Absence or prevention of progression during hospitalization  INTERVENTIONS:  - Assess and monitor for signs and symptoms of infection  - Monitor lab/diagnostic results  - Monitor all insertion sites, i e  indwelling lines, tubes, and drains  - Monitor endotracheal (as able) and nasal secretions for changes in amount and color  - [place] appropriate cooling/warming therapies per order  - Administer medications as ordered  - Instruct and encourage patient and family to use good hand hygiene technique  - Identify and instruct in appropriate isolation precautions for identified infection/condition   Outcome: Progressing      Problem: SAFETY ADULT  Goal: Maintain or return to baseline ADL function  INTERVENTIONS:  -  Assess patient's ability to carry out ADLs; assess patient's baseline for ADL function and identify physical deficits which impact ability to perform ADLs (bathing, care of mouth/teeth, toileting, grooming, dressing, etc )  - Assess/evaluate cause of self-care deficits   - Assess range of motion  - Assess patient's mobility; develop plan if impaired  - Assess patient's need for assistive devices and provide as appropriate  - Encourage maximum independence but intervene and supervise when necessary  ¯ Involve family in performance of ADLs  ¯ Assess for home care needs following discharge   ¯ Request OT consult to assist with ADL evaluation and planning for discharge  ¯ Provide patient education as appropriate   Outcome: Progressing    Goal: Maintain or return mobility status to optimal level  INTERVENTIONS:  - Assess patient's baseline mobility status (ambulation, transfers, stairs, etc )    - Identify cognitive and physical deficits and behaviors that affect mobility  - Identify mobility aids required to assist with transfers and/or ambulation (gait belt, sit-to-stand, lift, walker, cane, etc )  - [place] fall precautions as indicated by assessment  - Record patient progress and toleration of activity level on Mobility SBAR; progress patient to next Phase/Stage  - Instruct patient to call for assistance with activity based on assessment  - Request Rehabilitation consult to assist with strengthening/weightbearing, etc    Outcome: Progressing      Problem: DISCHARGE PLANNING  Goal: Discharge to home or other facility with appropriate resources  INTERVENTIONS:  - Identify barriers to discharge w/patient and caregiver  - Arrange for needed discharge resources and transportation as appropriate  - Identify discharge learning needs (meds, wound care, etc )  - Arrange for interpretive services to assist at discharge as needed  - Refer to Case Management Department for coordinating discharge planning if the patient needs post-hospital services based on physician/advanced practitioner order or complex needs related to functional status, cognitive ability, or social support system   Outcome: Progressing      Problem: RESPIRATORY - ADULT  Goal: Achieves optimal ventilation and oxygenation  INTERVENTIONS:  - Assess for changes in respiratory status  - Assess for changes in mentation and behavior  - Position to facilitate oxygenation and minimize respiratory effort  - Oxygen administration by appropriate delivery method based on oxygen saturation (per order) or ABGs  - Initiate smoking cessation education as indicated  - Encourage broncho-pulmonary hygiene including cough, deep breathe, Incentive Spirometry  - Assess the need for suctioning and aspirate as needed  - Assess and instruct to report SOB or any respiratory difficulty  - Respiratory Therapy support as indicated   Outcome: Progressing      Problem: DISCHARGE PLANNING - CARE MANAGEMENT  Goal: Discharge to post-acute care or home with appropriate resources  INTERVENTIONS:  - Conduct assessment to determine patient/family and health care team treatment goals, and need for post-acute services based on payer coverage, community resources, and patient preferences, and barriers to discharge  - Address psychosocial, clinical, and financial barriers to discharge as identified in assessment in conjunction with the patient/family and health care team  - Arrange appropriate level of post-acute services according to patients   needs and preference and payer coverage in collaboration with the physician and health care team  - Communicate with and update the patient/family, physician, and health care team regarding progress on the discharge plan  - Arrange appropriate transportation to post-acute venues   Outcome: Progressing      Problem: Nutrition/Hydration-ADULT  Goal: Nutrient/Hydration intake appropriate for improving, restoring or maintaining nutritional needs  Monitor and assess patient's nutrition/hydration status for malnutrition (ex- brittle hair, bruises, dry skin, pale skin and conjunctiva, muscle wasting, smooth red tongue, and disorientation)  Collaborate with interdisciplinary team and initiate plan and interventions as ordered  Monitor patient's weight and dietary intake as ordered or per policy  Utilize nutrition screening tool and intervene per policy      INTERVENTIONS:  - Monitor oral intake, urinary output, labs, and treatment plans  - Assess nutrition and hydration status and recommend course of action  - Evaluate amount of meals eaten  - Assist patient with eating if necessary   - Allow adequate time for meals  - Recommend/ encourage appropriate diets, oral nutritional supplements, and vitamin/mineral supplements  - Order, calculate, and assess calorie counts as needed  - Assess need for intravenous fluids  - Provide specific nutrition/hydration education as appropriate  - Include patient/family/caregiver in decisions related to nutrition    Outcome: Progressing

## 2018-01-21 NOTE — PROGRESS NOTES
General Cardiology   Progress Note   Lisa Page 60 y o  male MRN: [id number]  Unit/Bed#: -01 Encounter: [id number]      SUBJECTIVE:   Patient is still symptomatic with shortness of breath  Patient continues to remain in atrial fibrillation  REVIEW OF SYSTEMS:  Constitutional:  Denies fever or chills   Eyes:  Denies change in visual acuity   HENT:  Denies nasal congestion or sore throat   Respiratory: shortness of breath   Cardiovascular:  Denies chest pain or edema   GI:  Denies abdominal pain, nausea, vomiting, bloody stools or diarrhea   :  Denies dysuria, frequency, difficulty in micturition and nocturia  Musculoskeletal:  Denies back pain or joint pain   Neurologic:  Denies headache, focal weakness or sensory changes   Endocrine:  Denies polyuria or polydipsia   Lymphatic:  Denies swollen glands   Psychiatric:  Denies depression or anxiety     OBJECTIVE:   Vitals:  Vitals:    01/21/18 0700   BP: 98/56   Pulse: 80   Resp: 18   Temp: 97 5 °F (36 4 °C)   SpO2: 95%     Body mass index is 42 69 kg/m²  Systolic (44NCK), ALS:193 , Min:90 , CGR:467     Diastolic (33UXO), OPW:66, Min:50, Max:81      Intake/Output Summary (Last 24 hours) at 01/21/18 0949  Last data filed at 01/21/18 0500   Gross per 24 hour   Intake              240 ml   Output             1155 ml   Net             -915 ml     Weight (last 2 days)     Date/Time   Weight    01/21/18 0600  (!)  141 (310 41)    01/20/18 0531  (!)  137 (302 25)    01/19/18 0328  (!)  136 (300 49)                  PHYSICAL EXAMS:  General:  Patient is not in acute distress, laying in the bed comfortably, awake, alert responding to commands  Head: Normocephalic, Atraumatic  HEENT:  Both pupils normal-size atraumatic, normocephalic, nonicteric  Neck:  JVP not raised  Trachea central  Respiratory:  Decreased breath sounds  Cardiovascular:  Irregularly irregular  GI:  Abdomen soft nontender   Liver and spleen normal size  Lymphatic:  No cervical or inguinal lymphadenopathy  Neurologic:  Patient is awake alert, responding to command, well-oriented to time and place and person moving     LABORATORY RESULTS:    Results from last 7 days  Lab Units 01/14/18  2318 01/14/18 1953 01/14/18  1540   TROPONIN I ng/mL 0 04 0 05* 0 05*       CBC with diff:   Results from last 7 days  Lab Units 01/20/18 0441 01/19/18 0317 01/18/18  0537 01/17/18  0547 01/16/18  0443   WBC Thousand/uL 7 52 7 91 6 86 6 34 7 53   HEMOGLOBIN g/dL 12 2 12 8 12 6 12 0 12 8   HEMATOCRIT % 37 3 38 9 37 5 36 3* 39 0   MCV fL 90 89 88 88 89   PLATELETS Thousands/uL 208 230 223 225 244   MCH pg 29 5 29 3 29 6 29 1 29 2   MCHC g/dL 32 7 32 9 33 6 33 1 32 8   RDW % 15 0 14 8 14 9 14 7 14 7   MPV fL 10 5 10 2 10 4 10 2 10 2   NRBC AUTO /100 WBCs  --   --  0 0 0       CMP:  Results from last 7 days  Lab Units 01/20/18 0441 01/19/18 0317 01/18/18  0537   SODIUM mmol/L 140 135* 137   POTASSIUM mmol/L 3 8 3 5 3 4*   CHLORIDE mmol/L 103 100 101   CO2 mmol/L 28 27 26   ANION GAP mmol/L 9 8 10   BUN mg/dL 26* 26* 26*   CREATININE mg/dL 1 02 1 09 1 12   GLUCOSE RANDOM mg/dL 273* 260* 321*   CALCIUM mg/dL 8 6 8 6 8 9   AST U/L 46*  --   --    ALT U/L 195*  --   --    ALK PHOS U/L 112  --   --    TOTAL PROTEIN g/dL 6 6  --   --    ALBUMIN g/dL 3 3*  --   --    BILIRUBIN TOTAL mg/dL 0 50  --   --    EGFR ml/min/1 73sq m 78 72 70       BMP:  Results from last 7 days  Lab Units 01/20/18 0441 01/19/18 0317 01/18/18  0537   SODIUM mmol/L 140 135* 137   POTASSIUM mmol/L 3 8 3 5 3 4*   CHLORIDE mmol/L 103 100 101   CO2 mmol/L 28 27 26   BUN mg/dL 26* 26* 26*   CREATININE mg/dL 1 02 1 09 1 12   GLUCOSE RANDOM mg/dL 273* 260* 321*   CALCIUM mg/dL 8 6 8 6 8 9         Results from last 7 days  Lab Units 01/20/18  0441 01/14/18  1218   NT-PRO BNP pg/mL 4,619* 4,171*        Results from last 7 days  Lab Units 01/20/18  0441 01/15/18  0305   MAGNESIUM mg/dL 1 6 2 0               Results from last 7 days  Lab Units 01/18/18  0537 01/14/18  1219   INR  1 06 1 13       Lipid Profile:   Lab Results   Component Value Date    CHOL 109 01/20/2018     Lab Results   Component Value Date    HDL 32 (L) 01/20/2018     Lab Results   Component Value Date    LDLCALC 58 01/20/2018     Lab Results   Component Value Date    TRIG 96 01/20/2018       Cardiac testing:  No results found for this or any previous visit  No results found for this or any previous visit  No results found for this or any previous visit  No procedure found  No results found for this or any previous visit  Meds/Allergies   all current active meds have been reviewed  Prescriptions Prior to Admission   Medication    amLODIPine (NORVASC) 5 mg tablet    insulin glargine (LANTUS) 100 units/mL subcutaneous injection    losartan (COZAAR) 100 MG tablet    metFORMIN (GLUMETZA) 1000 MG (MOD) 24 hr tablet    simvastatin (ZOCOR) 40 mg tablet          ASSESSMENT & PLAN     Patient with nonischemic cardiomyopathy and atrial fibrillation which appears to be symptomatic  Lengthy discussion with patient regarding risks and benefits of transesophageal echo and if that is negative considerations for cardioversion  Risks and benefits including but not limited to risks associated with BEATRIZ  as well as a small risk of stroke associated with cardioversion were discussed with the patient  Patient wishes to proceed  This was also discussed with Dr Barrera , primary cardiologist   Patient will be kept NPO for BEATRIZ cardioversion tomorrow  Continue present medications  Discussed with hospitalist service  Ko Leyva MD  1/21/2018,9:49 AM    Portions of the record may have been created with voice recognition software   Occasional wrong word or "sound a like" substitutions may have occurred due to the inherent limitations of voice recognition software   Read the chart carefully and recognize, using context, where substitutions have occurred

## 2018-01-21 NOTE — PROGRESS NOTES
[facility] Internal Medicine Progress Note  Patient: Ruben Rodriguez 60 y o  male   MRN: [id number]  PCP: No primary care provider on file  Unit/Bed#: -01 Encounter: [id number]  Date Of Visit: 01/21/18    Assessment/Plan:    Principal Problem:    Acute combined systolic and diastolic heart failure (HCC)  Active Problems:    HTN (hypertension)    Diabetes mellitus type 2 in obese (HCC)    Atrial fibrillation with RVR (HCC)    Morbid obesity with BMI of 40 0-44 9, adult ([facility] )    Present on Admission:   HTN (hypertension)   [facility] mellitus [facility] 2 [facility] ([facility] )   Atrial fibrillation with RVR (HCC)   Acute combined systolic and diastolic heart failure (HCC)      · Acute hypoxic respiratory failure secondary to acute combined systolic and diastolic heart failure exacerbation  Heart rate is better controlled and I think his symptoms are also better now  Continue with ACE-inhibitor/diuretics in addition to rate controlling medications and anticoagulation with Eliquis  I reviewed Cardiology note  I am not sure about what patient is telling me that he is going to have BEATRIZ and cardioversion done tomorrow  Discussed with Cardiology  Dr Kaylin Sullivan has spoken with Dr Stormy Hammans about this  Dr Kaylin Sullivan has also discussed with patient's primary cardiologist and has updated him  Patient would need life vest at the time of discharge  · Elevated AST/ALT  Likely secondary to liver congestion  Would also check hepatitis profile  May need BEATRIZ if LFTs continued to be on the higher side  · Atrial fibrillation with rapid ventricular rate  Heart rate is currently controlled  Plan for cardioversion after BEATRIZ-ruling out clot  · Diabetes mellitus type 2 uncontrolled  Hemoglobin A1c is 9 4  Continue with current regimen  · Hypertension  Blood pressure stable  Continue with current treatment  · Dyslipidemia  HDL is only 32    Continue with current treatment      VTE Pharmacologic Prophylaxis:   Pharmacologic: Apixaban (Eliquis)  Mechanical VTE Prophylaxis in Place: Yes    Patient Centered Rounds: I have performed bedside rounds with nursing staff today  Discussions with Specialists or Other Care Team Provider:  Yes    Education and Discussions with Family / Patient:  Yes    Time Spent for Care: 35+ minutes  More than 50% of total time spent on counseling and coordination of care as described above  Current Length of Stay: 7 day(s)    Current Patient Status: Inpatient   Certification Statement: The patient will continue to require additional inpatient hospital stay due to Heart failure/atrial fibrillation/BEATRIZ and possible cardioversion    Discharge Plan:  Home when stable  However, patient would need oxygen saturation studies to assess need for home oxygen at the time of discharge    Code Status: Level 1 - Full Code      Subjective:   Feels okay  Denies any chest pain  He thinks that his breathing is slightly better  Objective:     Vitals:   Temp (24hrs), Av 6 °F (36 4 °C), Min:97 5 °F (36 4 °C), Max:97 8 °F (36 6 °C)    HR:  [60-91] 60  Resp:  [18-20] 18  BP: ()/(50-86) 119/86  SpO2:  [95 %-97 %] 97 %  Body mass index is 42 69 kg/m²  Input and Output Summary (last 24 hours): Intake/Output Summary (Last 24 hours) at 18 1141  Last data filed at 18 1051   Gross per 24 hour   Intake              240 ml   Output             1155 ml   Net             -915 ml           Physical Exam:     Morbidly obese male who is in bed  He is not really motivated to do much activity  He still is not convinced that he is sick and this may have been going on for some time  He wants to be fixed quickly  Vital signs reviewed as above  Laying In bed  He is less short of breath  Respiratory:  Better air entry in general     Neurology:  Awake and alert  Oriented x3  Abdomen is morbidly obese  Bowel sounds are audible  It is grossly nontender  Cardiology:  S1 and S2 are audible    Is in atrial fibrillation on the monitor  Irregularly irregular heart rhythm  Heart rate better controlled in the 70s and 80s now  Psychiatric:  Appears depressed  Extremities:  Moving his arms and legs well  Additional Data:     Labs:      Results from last 7 days  Lab Units 01/20/18  0441  01/18/18  0537   WBC Thousand/uL 7 52  < > 6 86   HEMOGLOBIN g/dL 12 2  < > 12 6   HEMATOCRIT % 37 3  < > 37 5   PLATELETS Thousands/uL 208  < > 223   NEUTROS PCT %  --   --  76*   LYMPHS PCT %  --   --  13*   MONOS PCT %  --   --  9   EOS PCT %  --   --  2   < > = values in this interval not displayed  Results from last 7 days  Lab Units 01/20/18  0441   SODIUM mmol/L 140   POTASSIUM mmol/L 3 8   CHLORIDE mmol/L 103   CO2 mmol/L 28   BUN mg/dL 26*   CREATININE mg/dL 1 02   CALCIUM mg/dL 8 6   TOTAL PROTEIN g/dL 6 6   BILIRUBIN TOTAL mg/dL 0 50   ALK PHOS U/L 112   ALT U/L 195*   AST U/L 46*   GLUCOSE RANDOM mg/dL 273*       Results from last 7 days  Lab Units 01/18/18  0537   INR  1 06       * I Have Reviewed All Lab Data Listed Above  * Additional Pertinent Lab Tests Reviewed: All Labs Within Last 24 Hours Reviewed      Recent Cultures (last 7 days):           Last 24 Hours Medication List:     apixaban 5 mg Oral BID   chlorhexidine 15 mL [employer]it Q12H [address]   digoxin 125 mcg Oral Daily   furosemide 40 mg Oral Daily   insulin glargine 35 Units Subcutaneous Q12H KENA   insulin lispro 1-5 Units Subcutaneous HS   insulin lispro 2-12 Units Subcutaneous TID AC   lisinopril 5 mg Oral Daily   melatonin 6 mg Oral HS   metoprolol tartrate 100 mg Oral Q12H KENA   senna 1 tablet Oral HS        Today, Patient Was Seen By: Angela Elena MD    ** Please Note: Dragon 360 Dictation voice to text software may have been used in the creation of this document   **

## 2018-01-22 ENCOUNTER — APPOINTMENT (INPATIENT)
Dept: INTERVENTIONAL RADIOLOGY/VASCULAR | Facility: HOSPITAL | Age: 63
DRG: 281 | End: 2018-01-22
Payer: COMMERCIAL

## 2018-01-22 LAB
ALBUMIN SERPL BCP-MCNC: 3.4 G/DL (ref 3.5–5)
ALP SERPL-CCNC: 101 U/L (ref 46–116)
ALT SERPL W P-5'-P-CCNC: 134 U/L (ref 12–78)
ANION GAP SERPL CALCULATED.3IONS-SCNC: 9 MMOL/L (ref 4–13)
AST SERPL W P-5'-P-CCNC: 24 U/L (ref 5–45)
BASOPHILS # BLD AUTO: 0.09 THOUSANDS/ΜL (ref 0–0.1)
BASOPHILS NFR BLD AUTO: 1 % (ref 0–1)
BILIRUB DIRECT SERPL-MCNC: 0.2 MG/DL (ref 0–0.2)
BILIRUB SERPL-MCNC: 0.4 MG/DL (ref 0.2–1)
BUN SERPL-MCNC: 24 MG/DL (ref 5–25)
CALCIUM SERPL-MCNC: 8.8 MG/DL (ref 8.3–10.1)
CHLORIDE SERPL-SCNC: 104 MMOL/L (ref 100–108)
CO2 SERPL-SCNC: 27 MMOL/L (ref 21–32)
CREAT SERPL-MCNC: 1.01 MG/DL (ref 0.6–1.3)
EOSINOPHIL # BLD AUTO: 0.16 THOUSAND/ΜL (ref 0–0.61)
EOSINOPHIL NFR BLD AUTO: 2 % (ref 0–6)
ERYTHROCYTE [DISTWIDTH] IN BLOOD BY AUTOMATED COUNT: 14.9 % (ref 11.6–15.1)
ERYTHROCYTE [DISTWIDTH] IN BLOOD BY AUTOMATED COUNT: 14.9 % (ref 11.6–15.1)
GFR SERPL CREATININE-BSD FRML MDRD: 79 ML/MIN/1.73SQ M
GLUCOSE SERPL-MCNC: 162 MG/DL (ref 65–140)
GLUCOSE SERPL-MCNC: 168 MG/DL (ref 65–140)
GLUCOSE SERPL-MCNC: 199 MG/DL (ref 65–140)
GLUCOSE SERPL-MCNC: 346 MG/DL (ref 65–140)
HAV IGM SER QL: NORMAL
HBV CORE AB SER QL: NORMAL
HBV CORE IGM SER QL: NORMAL
HBV CORE IGM SER QL: NORMAL
HBV SURFACE AG SER QL: NORMAL
HBV SURFACE AG SER QL: NORMAL
HCT VFR BLD AUTO: 40.1 % (ref 36.5–49.3)
HCT VFR BLD AUTO: 41.1 % (ref 36.5–49.3)
HCV AB SER QL: NORMAL
HCV AB SER QL: NORMAL
HGB BLD-MCNC: 13.1 G/DL (ref 12–17)
HGB BLD-MCNC: 13.1 G/DL (ref 12–17)
LYMPHOCYTES # BLD AUTO: 1.43 THOUSANDS/ΜL (ref 0.6–4.47)
LYMPHOCYTES NFR BLD AUTO: 20 % (ref 14–44)
MCH RBC QN AUTO: 29.2 PG (ref 26.8–34.3)
MCH RBC QN AUTO: 29.5 PG (ref 26.8–34.3)
MCHC RBC AUTO-ENTMCNC: 31.9 G/DL (ref 31.4–37.4)
MCHC RBC AUTO-ENTMCNC: 32.7 G/DL (ref 31.4–37.4)
MCV RBC AUTO: 90 FL (ref 82–98)
MCV RBC AUTO: 92 FL (ref 82–98)
MONOCYTES # BLD AUTO: 0.57 THOUSAND/ΜL (ref 0.17–1.22)
MONOCYTES NFR BLD AUTO: 8 % (ref 4–12)
NEUTROPHILS # BLD AUTO: 4.8 THOUSANDS/ΜL (ref 1.85–7.62)
NEUTS SEG NFR BLD AUTO: 68 % (ref 43–75)
NRBC BLD AUTO-RTO: 0 /100 WBCS
PLATELET # BLD AUTO: 214 THOUSANDS/UL (ref 149–390)
PLATELET # BLD AUTO: 232 THOUSANDS/UL (ref 149–390)
PMV BLD AUTO: 10.7 FL (ref 8.9–12.7)
PMV BLD AUTO: 11.2 FL (ref 8.9–12.7)
POTASSIUM SERPL-SCNC: 3.9 MMOL/L (ref 3.5–5.3)
PROT SERPL-MCNC: 6.9 G/DL (ref 6.4–8.2)
RBC # BLD AUTO: 4.44 MILLION/UL (ref 3.88–5.62)
RBC # BLD AUTO: 4.49 MILLION/UL (ref 3.88–5.62)
SODIUM SERPL-SCNC: 140 MMOL/L (ref 136–145)
WBC # BLD AUTO: 7.08 THOUSAND/UL (ref 4.31–10.16)
WBC # BLD AUTO: 7.79 THOUSAND/UL (ref 4.31–10.16)

## 2018-01-22 PROCEDURE — 82948 REAGENT STRIP/BLOOD GLUCOSE: CPT

## 2018-01-22 PROCEDURE — 99157 MOD SED OTHER PHYS/QHP EA: CPT

## 2018-01-22 PROCEDURE — 85025 COMPLETE CBC W/AUTO DIFF WBC: CPT | Performed by: INTERNAL MEDICINE

## 2018-01-22 PROCEDURE — 85027 COMPLETE CBC AUTOMATED: CPT | Performed by: INTERNAL MEDICINE

## 2018-01-22 PROCEDURE — 99156 MOD SED OTH PHYS/QHP 5/>YRS: CPT

## 2018-01-22 PROCEDURE — 80048 BASIC METABOLIC PNL TOTAL CA: CPT | Performed by: INTERNAL MEDICINE

## 2018-01-22 PROCEDURE — 80076 HEPATIC FUNCTION PANEL: CPT | Performed by: INTERNAL MEDICINE

## 2018-01-22 RX ORDER — SODIUM CHLORIDE, SODIUM LACTATE, POTASSIUM CHLORIDE, CALCIUM CHLORIDE 600; 310; 30; 20 MG/100ML; MG/100ML; MG/100ML; MG/100ML
INJECTION, SOLUTION INTRAVENOUS CONTINUOUS PRN
Status: DISCONTINUED | OUTPATIENT
Start: 2018-01-22 | End: 2018-01-22 | Stop reason: SURG

## 2018-01-22 RX ORDER — PANTOPRAZOLE SODIUM 40 MG/1
40 TABLET, DELAYED RELEASE ORAL
Status: DISCONTINUED | OUTPATIENT
Start: 2018-01-22 | End: 2018-01-26 | Stop reason: HOSPADM

## 2018-01-22 RX ADMIN — INSULIN LISPRO 2 UNITS: 100 INJECTION, SOLUTION INTRAVENOUS; SUBCUTANEOUS at 17:02

## 2018-01-22 RX ADMIN — INSULIN LISPRO 2 UNITS: 100 INJECTION, SOLUTION INTRAVENOUS; SUBCUTANEOUS at 12:01

## 2018-01-22 RX ADMIN — FUROSEMIDE 40 MG: 40 TABLET ORAL at 08:35

## 2018-01-22 RX ADMIN — INSULIN LISPRO 4 UNITS: 100 INJECTION, SOLUTION INTRAVENOUS; SUBCUTANEOUS at 22:03

## 2018-01-22 RX ADMIN — CHLORHEXIDINE GLUCONATE 15 ML: 1.2 RINSE ORAL at 08:35

## 2018-01-22 RX ADMIN — DIGOXIN 125 MCG: 0.12 TABLET ORAL at 08:34

## 2018-01-22 RX ADMIN — METOPROLOL TARTRATE 100 MG: 50 TABLET ORAL at 22:01

## 2018-01-22 RX ADMIN — MELATONIN TAB 3 MG 6 MG: 3 TAB at 22:01

## 2018-01-22 RX ADMIN — PANTOPRAZOLE SODIUM 40 MG: 40 TABLET, DELAYED RELEASE ORAL at 18:44

## 2018-01-22 RX ADMIN — INSULIN GLARGINE 35 UNITS: 100 INJECTION, SOLUTION SUBCUTANEOUS at 22:02

## 2018-01-22 RX ADMIN — SODIUM CHLORIDE, SODIUM LACTATE, POTASSIUM CHLORIDE, AND CALCIUM CHLORIDE: .6; .31; .03; .02 INJECTION, SOLUTION INTRAVENOUS at 09:58

## 2018-01-22 RX ADMIN — CHLORHEXIDINE GLUCONATE 15 ML: 1.2 RINSE ORAL at 22:01

## 2018-01-22 RX ADMIN — APIXABAN 5 MG: 5 TABLET, FILM COATED ORAL at 08:35

## 2018-01-22 RX ADMIN — METOPROLOL TARTRATE 100 MG: 50 TABLET ORAL at 08:35

## 2018-01-22 NOTE — PLAN OF CARE
Problem: Potential for Falls  Goal: Patient will remain free of falls  INTERVENTIONS:  - Assess patient frequently for physical needs  -  Identify cognitive and physical deficits and behaviors that affect risk of falls    -  [place] fall precautions as indicated by assessment   - Educate patient/family on patient safety including physical limitations  - Instruct patient to call for assistance with activity based on assessment  - Modify environment to reduce risk of injury  - Consider OT/PT consult to assist with strengthening/mobility   Outcome: Progressing      Problem: Prexisting or High Potential for Compromised Skin Integrity  Goal: Skin integrity is maintained or improved  INTERVENTIONS:  - Identify patients at risk for skin breakdown  - Assess and monitor skin integrity  - Assess and monitor nutrition and hydration status  - Monitor labs (i e  albumin)  - Assess for incontinence   - Turn and reposition patient  - Assist with mobility/ambulation  - Relieve pressure over bony prominences  - Avoid friction and shearing  - Provide appropriate hygiene as needed including keeping skin clean and dry  - Evaluate need for skin moisturizer/barrier cream  - Collaborate with interdisciplinary team (i e  Nutrition, Rehabilitation, etc )   - Patient/family teaching   Outcome: Progressing      Problem: PAIN - ADULT  Goal: Verbalizes/displays adequate comfort level or baseline comfort level  Interventions:  - Encourage patient to monitor pain and request assistance  - Assess pain using appropriate pain scale  - Administer analgesics based on type and severity of pain and evaluate response  - Implement non-pharmacological measures as appropriate and evaluate response  - Consider cultural and social influences on pain and pain management  - Notify physician/advanced practitioner if interventions unsuccessful or patient reports new pain   Outcome: Progressing      Problem: INFECTION - ADULT  Goal: Absence or prevention of progression during hospitalization  INTERVENTIONS:  - Assess and monitor for signs and symptoms of infection  - Monitor lab/diagnostic results  - Monitor all insertion sites, i e  indwelling lines, tubes, and drains  - Monitor endotracheal (as able) and nasal secretions for changes in amount and color  - [place] appropriate cooling/warming therapies per order  - Administer medications as ordered  - Instruct and encourage patient and family to use good hand hygiene technique  - Identify and instruct in appropriate isolation precautions for identified infection/condition   Outcome: Progressing      Problem: SAFETY ADULT  Goal: Maintain or return to baseline ADL function  INTERVENTIONS:  -  Assess patient's ability to carry out ADLs; assess patient's baseline for ADL function and identify physical deficits which impact ability to perform ADLs (bathing, care of mouth/teeth, toileting, grooming, dressing, etc )  - Assess/evaluate cause of self-care deficits   - Assess range of motion  - Assess patient's mobility; develop plan if impaired  - Assess patient's need for assistive devices and provide as appropriate  - Encourage maximum independence but intervene and supervise when necessary  ¯ Involve family in performance of ADLs  ¯ Assess for home care needs following discharge   ¯ Request OT consult to assist with ADL evaluation and planning for discharge  ¯ Provide patient education as appropriate   Outcome: Progressing    Goal: Maintain or return mobility status to optimal level  INTERVENTIONS:  - Assess patient's baseline mobility status (ambulation, transfers, stairs, etc )    - Identify cognitive and physical deficits and behaviors that affect mobility  - Identify mobility aids required to assist with transfers and/or ambulation (gait belt, sit-to-stand, lift, walker, cane, etc )  - [place] fall precautions as indicated by assessment  - Record patient progress and toleration of activity level on Mobility SBAR; progress patient to next Phase/Stage  - Instruct patient to call for assistance with activity based on assessment  - Request Rehabilitation consult to assist with strengthening/weightbearing, etc    Outcome: Progressing      Problem: DISCHARGE PLANNING  Goal: Discharge to home or other facility with appropriate resources  INTERVENTIONS:  - Identify barriers to discharge w/patient and caregiver  - Arrange for needed discharge resources and transportation as appropriate  - Identify discharge learning needs (meds, wound care, etc )  - Arrange for interpretive services to assist at discharge as needed  - Refer to Case Management Department for coordinating discharge planning if the patient needs post-hospital services based on physician/advanced practitioner order or complex needs related to functional status, cognitive ability, or social support system   Outcome: Progressing      Problem: RESPIRATORY - ADULT  Goal: Achieves optimal ventilation and oxygenation  INTERVENTIONS:  - Assess for changes in respiratory status  - Assess for changes in mentation and behavior  - Position to facilitate oxygenation and minimize respiratory effort  - Oxygen administration by appropriate delivery method based on oxygen saturation (per order) or ABGs  - Initiate smoking cessation education as indicated  - Encourage broncho-pulmonary hygiene including cough, deep breathe, Incentive Spirometry  - Assess the need for suctioning and aspirate as needed  - Assess and instruct to report SOB or any respiratory difficulty  - Respiratory Therapy support as indicated   Outcome: Progressing      Problem: DISCHARGE PLANNING - CARE MANAGEMENT  Goal: Discharge to post-acute care or home with appropriate resources  INTERVENTIONS:  - Conduct assessment to determine patient/family and health care team treatment goals, and need for post-acute services based on payer coverage, community resources, and patient preferences, and barriers to discharge  - Address psychosocial, clinical, and financial barriers to discharge as identified in assessment in conjunction with the patient/family and health care team  - Arrange appropriate level of post-acute services according to patients   needs and preference and payer coverage in collaboration with the physician and health care team  - Communicate with and update the patient/family, physician, and health care team regarding progress on the discharge plan  - Arrange appropriate transportation to post-acute venues   Outcome: Progressing      Problem: Nutrition/Hydration-ADULT  Goal: Nutrient/Hydration intake appropriate for improving, restoring or maintaining nutritional needs  Monitor and assess patient's nutrition/hydration status for malnutrition (ex- brittle hair, bruises, dry skin, pale skin and conjunctiva, muscle wasting, smooth red tongue, and disorientation)  Collaborate with interdisciplinary team and initiate plan and interventions as ordered  Monitor patient's weight and dietary intake as ordered or per policy  Utilize nutrition screening tool and intervene per policy      INTERVENTIONS:  - Monitor oral intake, urinary output, labs, and treatment plans  - Assess nutrition and hydration status and recommend course of action  - Evaluate amount of meals eaten  - Assist patient with eating if necessary   - Allow adequate time for meals  - Recommend/ encourage appropriate diets, oral nutritional supplements, and vitamin/mineral supplements  - Order, calculate, and assess calorie counts as needed  - Assess need for intravenous fluids  - Provide specific nutrition/hydration education as appropriate  - Include patient/family/caregiver in decisions related to nutrition    Outcome: Progressing

## 2018-01-22 NOTE — PROGRESS NOTES
[facility] Progress Note  Patient: Karlene Vicente 60 y o  male   MRN: [id number]  PCP: No primary care provider on file  Unit/Bed#: -01 Encounter: [id number]  Date Of Visit: 01/22/18    Assessment/Plan:          · Acute combined systolic and diastolic heart failure  Patient has significant cardiomyopathy-nonischemic with ejection fraction of 30% and uncontrolled atrial fibrillation with rapid ventricular rate  His heart rate is better controlled  He is still making very slow progress  Plan was patient to go for BEATRIZ and cardioversion today and it this was canceled because of her his respiratory status  I ordered oxygen saturation studies to be done tonight and tomorrow morning to assess need for home oxygen  I think patient and family not really see realistic about patient's illness I as they are expecting that he would recover back to his baseline and prior to becoming sick very quickly  Again explained to them about being patient and continue medications and close follow-up  · Atrial fibrillation with RVR  Heart rate is better controlled  Continue with medical management  · Anticoagulation with Eliquis  · Morbid obesity  Major comorbid issue  · Diabetes mellitus type 2 uncontrolled dash his insulin was held because he was NPO overnight  That is why, his sugars were high  As he is on significant amount of insulin, I would split his insulin twice a day at the time of discharge  With his obesity, I think he may also be having some issues with absorption of the insulin  His A1c was above 9  He also needs to work on his diet and losing some weight  VTE Pharmacologic Prophylaxis:   Pharmacologic: Apixaban (Eliquis)  Mechanical VTE Prophylaxis in Place: Yes    Patient Centered Rounds: I have performed bedside rounds with nursing staff today      Discussions with Specialists or Other Care Team Provider:  Yes    Education and Discussions with Family / Patient:  Yes      Current Length of Stay: 8 day(s)    Current Patient Status: Inpatient   Certification Statement: The patient will continue to require additional inpatient hospital stay due to Heart failure/atrial fibrillation/assess need for oxygen at home    Discharge Plan:  Home when stable    Code Status: Level 1 - Full Code      Subjective:   He feels okay although more short of breath with minimal exertion  His BEATRIZ was canceled as he was having respiratory issues which were not really new  Denies any chest pain  Denies any nausea or vomiting  Denies any fever or chills    Objective:     Vitals:   Temp (24hrs), Av 6 °F (36 4 °C), Min:97 5 °F (36 4 °C), Max:97 7 °F (36 5 °C)    HR:  [80-95] 87  Resp:  [16-18] 18  BP: (102-117)/(70-80) 102/73  SpO2:  [95 %-98 %] 95 %  Body mass index is 42 69 kg/m²  Input and Output Summary (last 24 hours): Intake/Output Summary (Last 24 hours) at 18 1122  Last data filed at 18 2095   Gross per 24 hour   Intake             1040 ml   Output              600 ml   Net              440 ml           Physical Exam:     Vital signs are reviewed as above  Patient up in the chair  With minimal activity and talking, he would become more short of breath  Abdomen is obese  Nontender  Bowel sounds are audible  Has oxygen on  Oropharynx are crowded  Lips are dry  Awake and alert  Oriented x3  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, he has decreased breath sounds at the bases with few coarse crackles  Anxious  None now stable bilateral lower extremities edema-mild        Additional Data:     Labs:      Results from last 7 days  Lab Units 18  0453  18  0537   WBC Thousand/uL 7 79  < > 6 86   HEMOGLOBIN g/dL 13 1  < > 12 6   HEMATOCRIT % 41 1  < > 37 5   PLATELETS Thousands/uL 232  < > 223   NEUTROS PCT %  --   --  76*   LYMPHS PCT %  --   --  13*   MONOS PCT %  --   --  9   EOS PCT %  --   --  2   < > = values in this interval not displayed      Results from last 7 days  Lab Units 01/22/18  0453   SODIUM mmol/L 140   POTASSIUM mmol/L 3 9   CHLORIDE mmol/L 104   CO2 mmol/L 27   BUN mg/dL 24   CREATININE mg/dL 1 01   CALCIUM mg/dL 8 8   TOTAL PROTEIN g/dL 6 9   BILIRUBIN TOTAL mg/dL 0 40   ALK PHOS U/L 101   ALT U/L 134*   AST U/L 24   GLUCOSE RANDOM mg/dL 199*       Results from last 7 days  Lab Units 01/18/18  0537   INR  1 06       * I Have Reviewed All Lab Data Listed Above  * Additional Pertinent Lab Tests Reviewed: All Labs Within Last 24 Hours Reviewed    DeleteRecent Cultures (last 7 days):           Last 24 Hours Medication List:     apixaban 5 mg Oral BID   chlorhexidine 15 mL Swish & Spit Q12H [facility]   digoxin 125 mcg Oral Daily   furosemide 40 mg Oral Daily   insulin glargine 35 Units Subcutaneous Q12H KENA   insulin lispro 1-5 Units Subcutaneous HS   insulin lispro 2-12 Units Subcutaneous TID AC   lisinopril 5 mg Oral Daily   melatonin 6 mg Oral HS   metoprolol tartrate 100 mg Oral Q12H KENA   senna 1 tablet Oral HS        Today, Patient Was Seen By: Tammy Davis MD    ** Please Note: Dragon 360 Dictation voice to text software may have been used in the creation of this document   **

## 2018-01-22 NOTE — ANESTHESIA PREPROCEDURE EVALUATION
Review of Systems/Medical History  Patient summary reviewed  Chart reviewed  No history of anesthetic complications     Cardiovascular  EKG reviewed, Exercise tolerance: poor,  Hyperlipidemia, Hypertension controlled, Dysrhythmias, atrial fibrillation,   Comment: Acute combined systolic and diastolic heart failure (HCC)    LEFT VENTRICLE: The ventricle was mildly dilated  Systolic function was markedly reduced  Ejection fraction was estimated to be 30 %  There was severe diffuse hypokinesis    Metoprolol / Eliquis / Digoxin given this am ,  Pulmonary  Negative pulmonary ROS   Comment: On 2l/m 02 - denies 02 use at home  SOB with exertion since A-fib onset  GI/Hepatic  Negative GI/hepatic ROS          Negative  ROS        Endo/Other  Diabetes poorly controlled type 2 ,   Obesity  morbid obesity   GYN       Hematology  Negative hematology ROS      Musculoskeletal  Negative musculoskeletal ROS        Neurology  Negative neurology ROS      Psychology   Negative psychology ROS              Physical Exam    Airway    Mallampati score: II  TM Distance: >3 FB  Neck ROM: full     Dental   No notable dental hx     Cardiovascular  Rhythm: regular, Rate: normal,     Pulmonary  Comment: SOB with exertion, Pulmonary exam normal Decreased breath sounds,     Other Findings        Anesthesia Plan  ASA Score- 3     Anesthesia Type- IV sedation with anesthesia with ASA Monitors  Additional Monitors:   Airway Plan:         Plan Factors-    Induction- intravenous  Postoperative Plan-     Informed Consent- Anesthetic plan and risks discussed with patient  I personally reviewed this patient with the CRNA  Discussed and agreed on the Anesthesia Plan with the CRNA  Johana Bracken

## 2018-01-22 NOTE — PROGRESS NOTES
Pts O2 saturation on room air was tested, pt sated at 94%  Pt sated on room air with exercise at 97%  Pt sated on 2 Liters of O2 with exercise at 95%      Pt still complains of SOB with exertion and sometimes at rest      Tamara Laureano RN  01/22/18  1:07 PM

## 2018-01-22 NOTE — PROGRESS NOTES
[facility] Progress Note  Patient: Gino Vargas 60 y o  male   MRN: [id number]  PCP: No primary care provider on file  Unit/Bed#: -01 Encounter: [id number]  Date Of Visit: 01/22/18    Revisit:    Patient had bright red bleeding per rectum  He again had couple of episodes  He did not have staff to look at it  He tells me today that he had some bleeding yesterday to which he did not tell anyone  He is on Eliquis  The bleeding is painless  Would hold Eliquis and consult GI  Would also start him on PPI  Discussed with patient and daughter  Also discussed with staff  Patient does not give any history of having previous bleeding per rectum  Also has not had any colonoscopy in the past       Spoke with GI on call Dr Michelle Antony    He would likely need at least sigmoidoscopy

## 2018-01-22 NOTE — PROGRESS NOTES
General Cardiology   Progress Note   Andry Roblero 60 y o  male MRN: [id number]  Unit/Bed#: -01 Encounter: [id number]        Subjective:    No significant events since the last encounter  BEATRIZ/DCCV WAS CANCELED as anesthesiologist start patient was not stable for sedation  Patient is lethargic and continues to be short of breath on exertion  Heart rates are well controlled  Objective:   Vitals:  Vitals:    01/22/18 1100   BP: 122/68   Pulse: 85   Resp: 20   Temp: 97 9 °F (36 6 °C)   SpO2:        Body mass index is 42 69 kg/m²  Systolic (84WZL), MISAEL:871 , Min:102 , WJR:312     Diastolic (19EUH), PBV:07, Min:68, Max:80      Intake/Output Summary (Last 24 hours) at 01/22/18 1351  Last data filed at 01/22/18 1138   Gross per 24 hour   Intake             1040 ml   Output              950 ml   Net               90 ml     Weight (last 2 days)     Date/Time   Weight    01/21/18 0600  (!)  141 (310 41)    01/20/18 0531  (!)  137 (302 25)              Telemetry Review:  AFib, rate controlled      PHYSICAL EXAMS:  General:  Patient is not in acute distress, laying in the bed comfortably, awake, alert responding to commands  Head: Normocephalic, Atraumatic  HEENT: White sclera, pink conjunctiva,  PERRLA,pharynx benign  Neck:  Supple, no neck vein distention, carotids+2/+2 no bruits, thyromegaly, adenopathy  Respiratory: clear to P/A, decreased breath sounds  Cardiovascular:  PMI normal, S1-S2 normal, No  Murmurs, thrills, gallops, rubs   Irregularly irregular  GI:  Abdomen soft nontender  No hepatosplenomegaly, adenopathy, ascites,or rebound tenderness  Extremities: No edema, normal pulses, no calf tenderness, no joint deformities, no venous disease   Integument:  No skin rashes or ulceration  Lymphatic:  No cervical or inguinal lymphadenopathy  Neurologic:  Patient is awake alert, responding to command, well-oriented to time and place and person moving all extremities        LABORATORY RESULTS:      CBC with diff:   Results from last 7 days  Lab Units 01/22/18  0453 01/20/18  0441 01/19/18  0317 01/18/18  0537 01/17/18  0547 01/16/18  0443   WBC Thousand/uL 7 79 7 52 7 91 6 86 6 34 7 53   HEMOGLOBIN g/dL 13 1 12 2 12 8 12 6 12 0 12 8   HEMATOCRIT % 41 1 37 3 38 9 37 5 36 3* 39 0   MCV fL 92 90 89 88 88 89   PLATELETS Thousands/uL 232 208 230 223 225 244   MCH pg 29 2 29 5 29 3 29 6 29 1 29 2   MCHC g/dL 31 9 32 7 32 9 33 6 33 1 32 8   RDW % 14 9 15 0 14 8 14 9 14 7 14 7   MPV fL 11 2 10 5 10 2 10 4 10 2 10 2   NRBC AUTO /100 WBCs  --   --   --  0 0 0       CMP:  Results from last 7 days  Lab Units 01/22/18  0453 01/20/18  0441 01/19/18  0317   SODIUM mmol/L 140 140 135*   POTASSIUM mmol/L 3 9 3 8 3 5   CHLORIDE mmol/L 104 103 100   CO2 mmol/L 27 28 27   ANION GAP mmol/L 9 9 8   BUN mg/dL 24 26* 26*   CREATININE mg/dL 1 01 1 02 1 09   GLUCOSE RANDOM mg/dL 199* 273* 260*   CALCIUM mg/dL 8 8 8 6 8 6   AST U/L 24 46*  --    ALT U/L 134* 195*  --    ALK PHOS U/L 101 112  --    TOTAL PROTEIN g/dL 6 9 6 6  --    ALBUMIN g/dL 3 4* 3 3*  --    BILIRUBIN TOTAL mg/dL 0 40 0 50  --    EGFR ml/min/1 73sq m 79 78 72       BMP:  Results from last 7 days  Lab Units 01/22/18  0453 01/20/18  0441 01/19/18  0317   SODIUM mmol/L 140 140 135*   POTASSIUM mmol/L 3 9 3 8 3 5   CHLORIDE mmol/L 104 103 100   CO2 mmol/L 27 28 27   BUN mg/dL 24 26* 26*   CREATININE mg/dL 1 01 1 02 1 09   GLUCOSE RANDOM mg/dL 199* 273* 260*   CALCIUM mg/dL 8 8 8 6 8 6         Results from last 7 days  Lab Units 01/20/18  0441   NT-PRO BNP pg/mL 4,619*        Results from last 7 days  Lab Units 01/20/18  0441   MAGNESIUM mg/dL 1 6               Results from last 7 days  Lab Units 01/18/18  0537   INR  1 06       Lipid Profile:   Lab Results   Component Value Date    CHOL 109 01/20/2018     Lab Results   Component Value Date    HDL 32 (L) 01/20/2018     Lab Results   Component Value Date    LDLCALC 58 01/20/2018     Lab Results   Component Value Date    TRIG 96 2018       Cardiac testing:   Results for orders placed during the hospital encounter of 18   Echo complete with contrast if indicated    Narrative [address] [place] [address]  [phone]    Transthoracic Echocardiogram  2D, M-mode, and Color Doppler    Study date:  2018    Patient: William Rivera  MR number: [id number]  Account number: [de-identified]  : 1955  Age: 60 years  Gender: Male  Status: Inpatient  Location: Bedside  Height: 71 in  Weight: 313 lb  BP: 145/ 95 mmHg    Indications: Atrial fibrillation  Diagnoses: I48 0 - Atrial fibrillation    Sonographer:  Jaramillo RCS  Interpreting Physician:  Ivania Sebastian MD  Referring Physician:  Ivania Sebastian MD  Group:  [facility]    SUMMARY    LEFT VENTRICLE:  The ventricle was mildly dilated  Systolic function was markedly reduced  Ejection fraction was estimated to be 30 %  There was severe diffuse hypokinesis  MITRAL VALVE:  There was mild regurgitation  TRICUSPID VALVE:  There was mild regurgitation  PULMONIC VALVE:  There was mild regurgitation  HISTORY: PRIOR HISTORY: Rapid atrial fibrillation  Tachycardia  Shortness of breath  Risk factors: hypertension, diabetes, and morbid obesity  PROCEDURE: The procedure was performed at the bedside  This was a routine study  The transthoracic approach was used  The study included complete 2D imaging, M-mode, and color Doppler  The heart rate was 116 bpm, at the start of the study  Images were obtained from the parasternal, apical, and subcostal acoustic windows  Echocardiographic views were limited due to poor acoustic window availability, decreased penetration, and lung interference  This was a technically  difficult study  LEFT VENTRICLE: The ventricle was mildly dilated  Systolic function was markedly reduced  Ejection fraction was estimated to be 30 %   There was severe diffuse hypokinesis  DOPPLER: Left ventricular diastolic function parameters were  abnormal     RIGHT VENTRICLE: The size was normal  Systolic function was normal  Wall thickness was normal     LEFT ATRIUM: Size was normal     RIGHT ATRIUM: Size was normal     MITRAL VALVE: Valve structure was normal  There was normal leaflet separation  DOPPLER: The transmitral velocity was within the normal range  There was no evidence for stenosis  There was mild regurgitation  AORTIC VALVE: The valve was trileaflet  Leaflets exhibited normal thickness and normal cuspal separation  DOPPLER: Transaortic velocity was within the normal range  There was no evidence for stenosis  There was no regurgitation  TRICUSPID VALVE: The valve structure was normal  There was normal leaflet separation  DOPPLER: The transtricuspid velocity was within the normal range  There was no evidence for stenosis  There was mild regurgitation  PULMONIC VALVE: Leaflets exhibited normal thickness, no calcification, and normal cuspal separation  DOPPLER: The transpulmonic velocity was within the normal range  There was mild regurgitation  PERICARDIUM: There was no pericardial effusion  The pericardium was normal in appearance  AORTA: The root exhibited normal size  SYSTEMIC VEINS: IVC: The inferior vena cava was not well visualized  SYSTEM MEASUREMENT TABLES    2D  %FS: 15 %  Ao Diam: 4 cm  EDV(Teich): 188 4 ml  EF(Teich): 31 1 %  ESV(Teich): 129 8 ml  IVSd: 0 7 cm  LA Diam: 4 6 cm  LVIDd: 6 1 cm  LVIDs: 5 2 cm  LVPWd: 1 1 cm  SV(Teich): 58 6 ml    CW  TR Vmax: 2 9 m/s  TR maxP 8 mmHg    [employer] Accredited Echocardiography Laboratory    Prepared and electronically signed by    Jono Clements MD  Signed 2018 17:32:18    Addendum Date: 2018 12:57:19 PM  Addendum for re-trigger of HL7 to Epic for Fianl status  Addendum entered by: Brando Gutierrez       No results found for this or any previous visit    No results found for this or any previous visit  No procedure found  No results found for this or any previous visit  Meds/Allergies   all current active meds have been reviewed  Prescriptions Prior to Admission   Medication    amLODIPine (NORVASC) 5 mg tablet    insulin glargine (LANTUS) 100 units/mL subcutaneous injection    losartan (COZAAR) 100 MG tablet    metFORMIN (GLUMETZA) 1000 MG (MOD) 24 hr tablet    simvastatin (ZOCOR) 40 mg tablet            Assessment/Plan:  Atrial fibrillation   Heart rate well controlled on digoxin and Lopressor  -On eliquis for anticoagulation     NSTEMI type 2  -secondary to AF with RVR and acute CHF     Acute systolic CHF/nonischemic cardiomyopathy- due to rapid AFib  -EF 30% with severe diffuse hypokinesis  -on lopressor and  lisinopril   - life vest before DC     Hypertension- BP soft, but overall stable    Counseling / Coordination of Care  Total floor / unit time spent today 30 minutes  Greater than 50% of total time was spent with the patient and / or family counseling and / or coordination of care  ** Please Note: Dragon 360 Dictation voice to text software may have been used in the creation of this document   **

## 2018-01-22 NOTE — PROGRESS NOTES
Called to room and patient was complaining of rectal bleeding when going to the bathroom  Said that happened last night as well  Patient worried it might be from the Eliquis he is on  MD notified  Will continue to monitor

## 2018-01-22 NOTE — CASE MANAGEMENT
Continued Stay Review    Date: 1/22    Vital Signs: /68 (BP Location: Left arm)   Pulse 85   Temp 97 9 °F (36 6 °C) (Oral)   Resp 20   Ht 5' 11 5" (1 816 m)   Wt (!) 141 kg (310 lb 6 5 oz)   SpO2 95%   BMI 42 69 kg/m²     Medications:   Scheduled Meds:   apixaban 5 mg Oral BID   chlorhexidine 15 mL Swish & Spit Q12H [facility]   digoxin 125 mcg Oral Daily   furosemide 40 mg Oral Daily   insulin glargine 35 Units Subcutaneous Q12H KENA   insulin lispro 1-5 Units Subcutaneous HS   insulin lispro 2-12 Units Subcutaneous TID AC   lisinopril 5 mg Oral Daily   melatonin 6 mg Oral HS   metoprolol tartrate 100 mg Oral Q12H KENA   senna 1 tablet Oral HS     Continuous Infusions:    PRN Meds:   acetaminophen    albuterol    Abnormal Labs/Diagnostic Results:     Blood glucose last 24 hrs 221,344,199, 162       Total Bilirubin 0 40 0 20 - 1 00 mg/dL     Bilirubin, Direct 0 20 0 00 - 0 20 mg/dL     Alkaline Phosphatase 101 46 - 116 U/L     AST 24 5 - 45 U/L          (H) 12 - 78 U/L         Total Protein 6 9 6 4 - 8 2 g/dL     Albumin 3 4 (L) 3 5 - 5 0 g/dL          Age/Sex: 60 y o  male     · Assessment/Plan: Acute combined systolic and diastolic heart failure  Patient has significant cardiomyopathy-nonischemic with ejection fraction of 30% and uncontrolled atrial fibrillation with rapid ventricular rate  His heart rate is better controlled  He is still making very slow progress  Plan was patient to go for BEATRIZ and cardioversion today and it this was canceled because of his respiratory status  I ordered oxygen saturation studies to be done tonight and tomorrow morning to assess need for home oxygen  I think patient and family not really see realistic about patient's illness I as they are expecting that he would recover back to his baseline and prior to becoming sick very quickly  Again explained to them about being patient and continue medications and close follow-up  · Atrial fibrillation with RVR    Heart rate is better controlled  Continue with medical management  · Anticoagulation with Eliquis  · Morbid obesity  Major comorbid issue  · Diabetes mellitus type 2 uncontrolled dash his insulin was held because he was NPO overnight  That is why, his sugars were high  As he is on significant amount of insulin, I would split his insulin twice a day at the time of discharge  With his obesity, I think he may also be having some issues with absorption of the insulin  His A1c was above 9  He also needs to work on his diet and losing some weight      Current Patient Status: Inpatient   Certification Statement: The patient will continue to require additional inpatient hospital stay due to Heart failure/atrial fibrillation/assess need for oxygen at home  Discharge Plan: TBD      ==============================================================  Cardiology 1/22     BEATRIZ/DCCV WAS CANCELED as patient was not stable for sedation  Patient is lethargic and continues to be short of breath on exertion  Heart rates are well controlled  Assessment/Plan:  Atrial fibrillation   Heart rate well controlled on digoxin and Lopressor  -On eliquis for anticoagulation     NSTEMI type 2  -secondary to AF with RVR and acute CHF     Acute systolic CHF/nonischemic cardiomyopathy- due to rapid AFib  -EF 30% with severe diffuse hypokinesis  -on lopressor and  lisinopril   - life vest before DC     Hypertension- BP soft, but overall stable     Thank you,  [facility] in the [facility] by [facility] Rouse for 2017  Network Utilization Review Department  Phone: [phone]; Fax [phone]  ATTENTION: The Network Utilization Review Department is now centralized for our 7 Facilities  Make a note that we have a new phone and fax numbers for our Department  Please call with any questions or concerns to [phone] and carefully follow the prompts so that you are directed to the right person   All voicemails are confidential  Fax any determinations, approvals, denials, and requests for initial or continue stay review clinical to [phone]  Due to HIGH CALL volume, it would be easier if you could please send faxed requests to expedite your requests and in part, help us provide discharge notifications faster

## 2018-01-23 PROBLEM — K62.5 BRBPR (BRIGHT RED BLOOD PER RECTUM): Status: ACTIVE | Noted: 2018-01-13

## 2018-01-23 LAB
ERYTHROCYTE [DISTWIDTH] IN BLOOD BY AUTOMATED COUNT: 14.8 % (ref 11.6–15.1)
GLUCOSE SERPL-MCNC: 203 MG/DL (ref 65–140)
GLUCOSE SERPL-MCNC: 205 MG/DL (ref 65–140)
GLUCOSE SERPL-MCNC: 330 MG/DL (ref 65–140)
GLUCOSE SERPL-MCNC: 65 MG/DL (ref 65–140)
GLUCOSE SERPL-MCNC: 69 MG/DL (ref 65–140)
GLUCOSE SERPL-MCNC: 71 MG/DL (ref 65–140)
GLUCOSE SERPL-MCNC: 86 MG/DL (ref 65–140)
HCT VFR BLD AUTO: 38.6 % (ref 36.5–49.3)
HGB BLD-MCNC: 12.6 G/DL (ref 12–17)
MCH RBC QN AUTO: 29.4 PG (ref 26.8–34.3)
MCHC RBC AUTO-ENTMCNC: 32.6 G/DL (ref 31.4–37.4)
MCV RBC AUTO: 90 FL (ref 82–98)
PLATELET # BLD AUTO: 173 THOUSANDS/UL (ref 149–390)
PMV BLD AUTO: 10.7 FL (ref 8.9–12.7)
RBC # BLD AUTO: 4.28 MILLION/UL (ref 3.88–5.62)
WBC # BLD AUTO: 6.27 THOUSAND/UL (ref 4.31–10.16)

## 2018-01-23 PROCEDURE — 82948 REAGENT STRIP/BLOOD GLUCOSE: CPT

## 2018-01-23 PROCEDURE — 94762 N-INVAS EAR/PLS OXIMTRY CONT: CPT

## 2018-01-23 PROCEDURE — 85027 COMPLETE CBC AUTOMATED: CPT | Performed by: INTERNAL MEDICINE

## 2018-01-23 RX ORDER — INSULIN GLARGINE 100 [IU]/ML
35 INJECTION, SOLUTION SUBCUTANEOUS
Status: DISCONTINUED | OUTPATIENT
Start: 2018-01-23 | End: 2018-01-25

## 2018-01-23 RX ORDER — METOPROLOL TARTRATE 50 MG/1
50 TABLET, FILM COATED ORAL EVERY 12 HOURS SCHEDULED
Status: DISCONTINUED | OUTPATIENT
Start: 2018-01-23 | End: 2018-01-25

## 2018-01-23 RX ADMIN — INSULIN LISPRO 4 UNITS: 100 INJECTION, SOLUTION INTRAVENOUS; SUBCUTANEOUS at 08:00

## 2018-01-23 RX ADMIN — POLYETHYLENE GLYCOL 3350, SODIUM SULFATE ANHYDROUS, SODIUM BICARBONATE, SODIUM CHLORIDE, POTASSIUM CHLORIDE 4000 ML: 236; 22.74; 6.74; 5.86; 2.97 POWDER, FOR SOLUTION ORAL at 20:19

## 2018-01-23 RX ADMIN — PANTOPRAZOLE SODIUM 40 MG: 40 TABLET, DELAYED RELEASE ORAL at 05:38

## 2018-01-23 RX ADMIN — PANTOPRAZOLE SODIUM 40 MG: 40 TABLET, DELAYED RELEASE ORAL at 16:35

## 2018-01-23 RX ADMIN — CHLORHEXIDINE GLUCONATE 15 ML: 1.2 RINSE ORAL at 20:22

## 2018-01-23 RX ADMIN — INSULIN LISPRO 8 UNITS: 100 INJECTION, SOLUTION INTRAVENOUS; SUBCUTANEOUS at 16:34

## 2018-01-23 RX ADMIN — INSULIN GLARGINE 35 UNITS: 100 INJECTION, SOLUTION SUBCUTANEOUS at 08:46

## 2018-01-23 RX ADMIN — CHLORHEXIDINE GLUCONATE 15 ML: 1.2 RINSE ORAL at 10:00

## 2018-01-23 RX ADMIN — METOPROLOL TARTRATE 50 MG: 50 TABLET ORAL at 20:21

## 2018-01-23 RX ADMIN — BISACODYL 10 MG: 5 TABLET, COATED ORAL at 20:19

## 2018-01-23 RX ADMIN — DIGOXIN 125 MCG: 0.12 TABLET ORAL at 08:51

## 2018-01-23 NOTE — CONSULTS
Consultation -  [facility]  Ruben Rodriguez 60 y o  male MRN: [id number]  Unit/Bed#: -01 Encounter: [id number]        Consults    Reason for Consult / Principal Problem: BRBPR    HPI: Mr Brad Abraham is a 59 yo M with a PMH of DM2 and HTN, who initially presented on 1/14 with dyspnea and rapid heartbeat, found to be in new onset rapid Afib, acute on chronic heart failure with NSTEMI type 2, and with his TTE on 1/19 showing an EF of 30%, severe diffuse hypokinesis, dilated left ventricle  He was on a heparin drip and has been transitioned to Eliquis due to his new onset Afib and high stroke risk  He is also supposed to be wearing a life vest on discharge due to his severe heart failure  GI is consulted due to patient reporting BRBPR for the past few days since starting Eliquis  He has no associated abdominal pain, nausea, vomiting, hematemesis, or melena  The bleeding is present in the toilet bowl water or with wiping, there is no blood mixed in with the stool  He has never had a colonoscopy  He is not sure if he has hemorrhoids  He has normal bowel movements without any recent change in bowel habits or unintentional weight loss  He has no known family history of stomach or colon cancer  He has never had an endoscopy  He denies any upper GI symptoms such as heartburn, chest pain, or dysphagia  REVIEW OF SYSTEMS: Negative except for as stated above    Historical Information   Past Medical History:   Diagnosis Date    Diabetes mellitus ([address] )     Hyperlipidemia     Hypertension      Past Surgical History:   Procedure Laterality Date    CHOLECYSTECTOMY      KNEE SURGERY Left     NECK SURGERY       Social History   History   Alcohol Use    1 8 oz/week    3 Cans of beer per week     History   Drug Use No     History   Smoking Status    Never Smoker   Smokeless Tobacco    Never Used     History reviewed  No pertinent family history      Meds/Allergies     Prescriptions Prior to Admission Medication    amLODIPine (NORVASC) 5 mg tablet    insulin glargine (LANTUS) 100 units/mL subcutaneous injection    losartan (COZAAR) 100 MG tablet    metFORMIN (GLUMETZA) 1000 MG (MOD) 24 hr tablet    simvastatin (ZOCOR) 40 mg tablet     Current Facility-Administered Medications   Medication Dose Route Frequency    acetaminophen (TYLENOL) tablet 650 mg  650 mg Oral Q6H PRN    albuterol inhalation solution 2 5 mg  2 5 mg Nebulization Q4H PRN    chlorhexidine (PERIDEX) 0 12 % oral rinse 15 mL  15 mL Swish & Spit Q12H [address]    digoxin (LANOXIN) tablet 125 mcg  125 mcg Oral Daily    furosemide (LASIX) tablet 40 mg  40 mg Oral Daily    insulin glargine (LANTUS) subcutaneous injection 35 Units  35 Units Subcutaneous HS    insulin lispro (HumaLOG) 100 units/mL subcutaneous injection 1-5 Units  1-5 Units Subcutaneous HS    insulin lispro (HumaLOG) 100 units/mL subcutaneous injection 2-12 Units  2-12 Units Subcutaneous TID AC    lisinopril (ZESTRIL) tablet 5 mg  5 mg Oral Daily    melatonin tablet 6 mg  6 mg Oral HS    metoprolol tartrate (LOPRESSOR) tablet 50 mg  50 mg Oral Q12H KENA    pantoprazole (PROTONIX) EC tablet 40 mg  40 mg Oral BID AC    senna (SENOKOT) tablet 8 6 mg  1 tablet Oral HS       No Known Allergies        Objective     Blood pressure 110/72, pulse 89, temperature 98 1 °F (36 7 °C), temperature source Oral, resp  rate 19, height 5' 11 5" (1 816 m), weight (!) 141 kg (310 lb 3 oz), SpO2 96 %        Intake/Output Summary (Last 24 hours) at 01/23/18 1224  Last data filed at 01/23/18 1100   Gross per 24 hour   Intake              808 ml   Output             1125 ml   Net             -317 ml         PHYSICAL EXAM:      General Appearance:   Alert, oriented x3, no acute distress   HENT[de-identified]  Eyes:   Normocephalic, atraumatic  Anicteric   Neck:  Supple, symmetrical, trachea midline   Lungs:   Clear to auscultation bilaterally, no respiratory distress   Heart[de-identified]   Regular rhythm, rate controlled, no murmur   Abdomen:   Obese, non-distended, soft, BS Active, NTTP   Rectal:   Deferred    Extremities:  No cyanosis or edema    Pulses:  2+ and symmetric all extremities    Skin:  No jaundice or pallor      Lab Results:     Results from last 7 days  Lab Units 01/23/18  0532 01/22/18  1848   WBC Thousand/uL 6 27 7 08   HEMOGLOBIN g/dL 12 6 13 1   HEMATOCRIT % 38 6 40 1   PLATELETS Thousands/uL 173 214   NEUTROS PCT %  --  68   LYMPHS PCT %  --  20   MONOS PCT %  --  8   EOS PCT %  --  2       Results from last 7 days  Lab Units 01/22/18  0453   SODIUM mmol/L 140   POTASSIUM mmol/L 3 9   CHLORIDE mmol/L 104   CO2 mmol/L 27   BUN mg/dL 24   CREATININE mg/dL 1 01   CALCIUM mg/dL 8 8   TOTAL PROTEIN g/dL 6 9   BILIRUBIN TOTAL mg/dL 0 40   ALK PHOS U/L 101   ALT U/L 134*   AST U/L 24   GLUCOSE RANDOM mg/dL 199*       Results from last 7 days  Lab Units 01/18/18  0537   INR  1 06           Imaging Studies: I have personally reviewed pertinent imaging studies  Xr Chest Pa & Lateral  Result Date: 1/14/2018  Impression: Cardiomegaly with mild vascular congestion and small left effusion         ASSESSMENT and PLAN:    Principal Problem:    Acute combined systolic and diastolic heart failure (HCC)  Active Problems:    HTN (hypertension)    Diabetes mellitus type 2 in obese (HCC)    Atrial fibrillation with RVR (HCC)    Morbid obesity with BMI of 40 0-44 9, adult (HCC)    BRBPR  - Differential includes hemorrhoid bleeding v diverticular bleed v AVM v polyp v less likely malignancy  - Discussed with cardiology; patient needs to be on Eliquis long term due to his significant stroke risk due to Afib and low EF of 15-30%  - Cardiology to place patient on heparin drip while Eliquis is being held, last dose of Eliquis on the AM of 1/22  - Luckily Hb is stable, but with continued bleeding and the patient never having a colonoscopy in the setting of needing long term anticoagulation, patient should have endoscopic evaluation prior to discharge  - Clear liquids today, NPO after midnight  - Plan for bowel prep later today with plan for colonoscopy tomorrow      Patient will be seen and examined by Dr Leticia Coffey  All murcia medical decisions were made by Dr Leticia Coffey  Thank you for allowing us to participate in the care of this patient  We will follow with you closely

## 2018-01-23 NOTE — PROGRESS NOTES
Patient had 1 episode of red blood on tissue in the to[let,no bowel movement noted  overnight,he denies pain or discomfort  Overnight pulse oximetry on room air

## 2018-01-23 NOTE — PROGRESS NOTES
[facility] Progress Note  Patient: Gregoria Nolan 60 y o  male   MRN: [id number]  PCP: No primary care provider on file  Unit/Bed#: MS Juan J-01 Encounter: [id number]  Date Of Visit: 01/23/18    Assessment/Plan:      1  Acute combined systolic-diastolic heart failure, this time oral Lasix EF 30%, BEATRIZ was canceled because of lack of anesthesia clearance, cardiology arranged for LifeVest, rest of the medications including an ACE-inhibitor and a beta-blocker was started  2   Type 2 diabetes, blood sugars have been all over the place, A1c of 9, this time since he likely is going to be NPO for procedure tomorrow I have already made him NPO pending GI evaluation, I will cut down the Lantus to half give her a dose of Lantus tonight and hold the morning dose tomorrow  3   Patient is not on a diabetic diet, I will put him on a diabetic diet monitor blood sugars    4  Rectal bleed, bright red, patient mentioned he had similar rectal bleed day before yesterday as well, has to be on Eliquis as per Cardiology therefore GI evaluation is pending    5  Will hold Eliquis    6  Hypotensive episode this morning, asymptomatic, cut down the Lopressor to 50 twice a day put holding parameters on all medications          VTE Pharmacologic Prophylaxis:   Pharmacologic: Apixaban (Eliquis) hold  Mechanical VTE Prophylaxis in Place: Yes    Patient Centered Rounds: I have performed bedside rounds with nursing staff today      Discussions with Specialists or Other Care Team Provider:  Yes    Education and Discussions with Family / Patient:  Yes      Current Length of Stay: 9 day(s)    Current Patient Status: Inpatient   Certification Statement: The patient will continue to require additional inpatient hospital stay due to Heart failure/atrial fibrillation/assess need for oxygen at home    Discharge Plan:  Home when stable    Code Status: Level 1 - Full Code      Subjective:   Pleasant middle-aged male no acute distress, discussed the plan in detail with him in the presence of Cardiology    Objective:     Vitals:   Temp (24hrs), Av 5 °F (36 4 °C), Min:96 7 °F (35 9 °C), Max:98 °F (36 7 °C)    HR:  [82-93] 82  Resp:  [20-22] 20  BP: ()/(62-78) 88/62  SpO2:  [91 %-98 %] 98 %  Body mass index is 42 66 kg/m²  Input and Output Summary (last 24 hours): Intake/Output Summary (Last 24 hours) at 18 1027  Last data filed at 18 0801   Gross per 24 hour   Intake              690 ml   Output             1275 ml   Net             -585 ml           Physical Exam:   General middle-aged male no acute distress  CVS S1-S2 heard  Chest clear to auscultation at this time  Abdomen obese nontender  Extremities no edema        Additional Data:     Labs:      Results from last 7 days  Lab Units 18  0532 18  1848   WBC Thousand/uL 6 27 7 08   HEMOGLOBIN g/dL 12 6 13 1   HEMATOCRIT % 38 6 40 1   PLATELETS Thousands/uL 173 214   NEUTROS PCT %  --  68   LYMPHS PCT %  --  20   MONOS PCT %  --  8   EOS PCT %  --  2       Results from last 7 days  Lab Units 18  0453   SODIUM mmol/L 140   POTASSIUM mmol/L 3 9   CHLORIDE mmol/L 104   CO2 mmol/L 27   BUN mg/dL 24   CREATININE mg/dL 1 01   CALCIUM mg/dL 8 8   TOTAL PROTEIN g/dL 6 9   BILIRUBIN TOTAL mg/dL 0 40   ALK PHOS U/L 101   ALT U/L 134*   AST U/L 24   GLUCOSE RANDOM mg/dL 199*       Results from last 7 days  Lab Units 18  0537   INR  1 06       * I Have Reviewed All Lab Data Listed Above  * Additional Pertinent Lab Tests Reviewed:  All Labs Within Last 24 Hours Reviewed    DeleteRecent Cultures (last 7 days):           Last 24 Hours Medication List:     chlorhexidine 15 mL Swish & Spit Q12H [address]   digoxin 125 mcg Oral Daily   furosemide 40 mg Oral Daily   insulin glargine 35 Units Subcutaneous HS   insulin lispro 1-5 Units Subcutaneous HS   insulin lispro 2-12 Units Subcutaneous TID AC   lisinopril 5 mg Oral Daily   melatonin 6 mg Oral HS   metoprolol tartrate 50 mg Oral Q12H KENA   pantoprazole 40 mg Oral BID AC   senna 1 tablet Oral HS        Today, Patient Was Seen By: Jacklyn Gant MD    ** Please Note: Dragon 360 Dictation voice to text software may have been used in the creation of this document   **

## 2018-01-23 NOTE — PROGRESS NOTES
General Cardiology   Progress Note   Mayela Ferraro 60 y o  male MRN: [id number]  Unit/Bed#: -01 Encounter: [id number]        Subjective:   Patient had an episode of GI bleed yesterday  Patient is hypotensive in am   Dyspnea has improved  Objective:   Vitals:  Vitals:    01/23/18 0848   BP: (!) 88/62   Pulse: 82   Resp:    Temp:    SpO2:        Body mass index is 42 66 kg/m²  Systolic (69LIM), NISHI:010 , Min:88 , HIZ:739     Diastolic (89NZQ), NEB:61, Min:62, Max:78      Intake/Output Summary (Last 24 hours) at 01/23/18 1043  Last data filed at 01/23/18 0801   Gross per 24 hour   Intake              690 ml   Output             1275 ml   Net             -585 ml     Weight (last 2 days)     Date/Time   Weight    01/23/18 0533  (!)  141 (310 19)    01/21/18 0600  (!)  141 (310 41)              Telemetry Review: No significant arrhythmias seen on telemetry review  PHYSICAL EXAMS:  General:  Patient is not in acute distress, laying in the bed comfortably, awake, alert responding to commands  Head: Normocephalic, Atraumatic  HEENT: White sclera, pink conjunctiva,  PERRLA,pharynx benign  Neck:  Supple, no neck vein distention, carotids+2/+2 no bruits, thyromegaly, adenopathy  Respiratory: clear to P/A  Cardiovascular:  PMI normal, S1-S2 normal, No  Murmurs, thrills, gallops, rubs   Regular rhythm  GI:  Abdomen soft nontender  No hepatosplenomegaly, adenopathy, ascites,or rebound tenderness  Extremities: No edema, normal pulses, no calf tenderness, no joint deformities, no venous disease   Integument:  No skin rashes or ulceration  Lymphatic:  No cervical or inguinal lymphadenopathy  Neurologic:  Patient is awake alert, responding to command, well-oriented to time and place and person moving all extremities        LABORATORY RESULTS:      CBC with diff:   Results from last 7 days  Lab Units 01/23/18  0532 01/22/18  1848 01/22/18  0453  01/18/18  0537 01/17/18  0547   WBC Thousand/uL 6 27 7 08 7 79  < > 6 86 6  34   HEMOGLOBIN g/dL 12 6 13 1 13 1  < > 12 6 12 0   HEMATOCRIT % 38 6 40 1 41 1  < > 37 5 36 3*   MCV fL 90 90 92  < > 88 88   PLATELETS Thousands/uL 173 214 232  < > 223 225   MCH pg 29 4 29 5 29 2  < > 29 6 29 1   MCHC g/dL 32 6 32 7 31 9  < > 33 6 33 1   RDW % 14 8 14 9 14 9  < > 14 9 14 7   MPV fL 10 7 10 7 11 2  < > 10 4 10 2   NRBC AUTO /100 WBCs  --  0  --   --  0 0   < > = values in this interval not displayed      CMP:  Results from last 7 days  Lab Units 01/22/18  0453 01/20/18  0441 01/19/18  0317   SODIUM mmol/L 140 140 135*   POTASSIUM mmol/L 3 9 3 8 3 5   CHLORIDE mmol/L 104 103 100   CO2 mmol/L 27 28 27   ANION GAP mmol/L 9 9 8   BUN mg/dL 24 26* 26*   CREATININE mg/dL 1 01 1 02 1 09   GLUCOSE RANDOM mg/dL 199* 273* 260*   CALCIUM mg/dL 8 8 8 6 8 6   AST U/L 24 46*  --    ALT U/L 134* 195*  --    ALK PHOS U/L 101 112  --    TOTAL PROTEIN g/dL 6 9 6 6  --    ALBUMIN g/dL 3 4* 3 3*  --    BILIRUBIN TOTAL mg/dL 0 40 0 50  --    EGFR ml/min/1 73sq m 79 78 72       BMP:  Results from last 7 days  Lab Units 01/22/18  0453 01/20/18  0441 01/19/18  0317   SODIUM mmol/L 140 140 135*   POTASSIUM mmol/L 3 9 3 8 3 5   CHLORIDE mmol/L 104 103 100   CO2 mmol/L 27 28 27   BUN mg/dL 24 26* 26*   CREATININE mg/dL 1 01 1 02 1 09   GLUCOSE RANDOM mg/dL 199* 273* 260*   CALCIUM mg/dL 8 8 8 6 8 6         Results from last 7 days  Lab Units 01/20/18  0441   NT-PRO BNP pg/mL 7,973*        Results from last 7 days  Lab Units 01/20/18  0441   MAGNESIUM mg/dL 1 6               Results from last 7 days  Lab Units 01/18/18  0537   INR  1 06       Lipid Profile:   Lab Results   Component Value Date    CHOL 109 01/20/2018     Lab Results   Component Value Date    HDL 32 (L) 01/20/2018     Lab Results   Component Value Date    LDLCALC 58 01/20/2018     Lab Results   Component Value Date    TRIG 96 01/20/2018       Cardiac testing:   Results for orders placed during the hospital encounter of 01/13/18   Echo complete with contrast if indicated    Narrative [address] [place] [address], [facility] [address] [address]  [phone]    Transthoracic Echocardiogram  2D, M-mode, and Color Doppler    Study date:  2018    Patient: Silva Trevino  MR number: [id number]  Account number: [de-identified]  : 1955  Age: 60 years  Gender: Male  Status: Inpatient  Location: Bedside  Height: 71 in  Weight: 313 lb  BP: 145/ 95 mmHg    Indications: Atrial fibrillation  Diagnoses: I48 0 - Atrial fibrillation    Sonographer:  Oerllana RCS  Interpreting Physician:  Benedict Oscar MD  Referring Physician:  Benedict Oscar MD  Group:  [facility]    SUMMARY    LEFT VENTRICLE:  The ventricle was mildly dilated  Systolic function was markedly reduced  Ejection fraction was estimated to be 30 %  There was severe diffuse hypokinesis  MITRAL VALVE:  There was mild regurgitation  TRICUSPID VALVE:  There was mild regurgitation  PULMONIC VALVE:  There was mild regurgitation  HISTORY: PRIOR HISTORY: Rapid atrial fibrillation  Tachycardia  Shortness of breath  Risk factors: hypertension, diabetes, and morbid obesity  PROCEDURE: The procedure was performed at the bedside  This was a routine study  The transthoracic approach was used  The study included complete 2D imaging, M-mode, and color Doppler  The heart rate was 116 bpm, at the start of the study  Images were obtained from the parasternal, apical, and subcostal acoustic windows  Echocardiographic views were limited due to poor acoustic window availability, decreased penetration, and lung interference  This was a technically  difficult study  LEFT VENTRICLE: The ventricle was mildly dilated  Systolic function was markedly reduced  Ejection fraction was estimated to be 30 %  There was severe diffuse hypokinesis   DOPPLER: Left ventricular diastolic function parameters were  abnormal     RIGHT VENTRICLE: The size was normal  Systolic function was normal  Wall thickness was normal     LEFT ATRIUM: Size was normal     RIGHT ATRIUM: Size was normal     MITRAL VALVE: Valve structure was normal  There was normal leaflet separation  DOPPLER: The transmitral velocity was within the normal range  There was no evidence for stenosis  There was mild regurgitation  AORTIC VALVE: The valve was trileaflet  Leaflets exhibited normal thickness and normal cuspal separation  DOPPLER: Transaortic velocity was within the normal range  There was no evidence for stenosis  There was no regurgitation  TRICUSPID VALVE: The valve structure was normal  There was normal leaflet separation  DOPPLER: The transtricuspid velocity was within the normal range  There was no evidence for stenosis  There was mild regurgitation  PULMONIC VALVE: Leaflets exhibited normal thickness, no calcification, and normal cuspal separation  DOPPLER: The transpulmonic velocity was within the normal range  There was mild regurgitation  PERICARDIUM: There was no pericardial effusion  The pericardium was normal in appearance  AORTA: The root exhibited normal size  SYSTEMIC VEINS: IVC: The inferior vena cava was not well visualized  SYSTEM MEASUREMENT TABLES    2D  %FS: 15 %  Ao Diam: 4 cm  EDV(Teich): 188 4 ml  EF(Teich): 31 1 %  ESV(Teich): 129 8 ml  IVSd: 0 7 cm  LA Diam: 4 6 cm  LVIDd: 6 1 cm  LVIDs: 5 2 cm  LVPWd: 1 1 cm  SV(Teich): 58 6 ml    CW  TR Vmax: 2 9 m/s  TR maxP 8 mmHg    [employer] Accredited Echocardiography Laboratory    Prepared and electronically signed by    Amador Valenzuela MD  Signed 2018 17:32:18    Addendum Date: 2018 12:57:19 PM  Addendum for re-trigger of HL7 to Epic for Fianl status  Addendum entered by: Torres Rose       No results found for this or any previous visit  No results found for this or any previous visit  No procedure found  No results found for this or any previous visit      Meds/Allergies all current active meds have been reviewed  Prescriptions Prior to Admission   Medication    amLODIPine (NORVASC) 5 mg tablet    insulin glargine (LANTUS) 100 units/mL subcutaneous injection    losartan (COZAAR) 100 MG tablet    metFORMIN (GLUMETZA) 1000 MG (MOD) 24 hr tablet    simvastatin (ZOCOR) 40 mg tablet          Assessment/Plan:  1  Atrial fibrillation:  Heart rate well controlled on digoxin and Lopressor  - Eliquis on hold due to GI bleed  GI work up pending      2  NSTEMI type 2:   -secondary to AF with RVR and acute CHF     3  Acute systolic CHF/nonischemic cardiomyopathy- due to rapid AFib  -EF 30% with severe diffuse hypokinesis  -on lopressor and  lisinopril   - life vest before DC      4  Hypertension- BP soft, but overall stable  Counseling / Coordination of Care  Total floor / unit time spent today 30 minutes  Greater than 50% of total time was spent with the patient and / or family counseling and / or coordination of care  ** Please Note: Dragon 360 Dictation voice to text software may have been used in the creation of this document   **

## 2018-01-24 ENCOUNTER — ANESTHESIA (INPATIENT)
Dept: PERIOP | Facility: HOSPITAL | Age: 63
DRG: 281 | End: 2018-01-24
Payer: COMMERCIAL

## 2018-01-24 ENCOUNTER — ANESTHESIA EVENT (INPATIENT)
Dept: PERIOP | Facility: HOSPITAL | Age: 63
DRG: 281 | End: 2018-01-24
Payer: COMMERCIAL

## 2018-01-24 LAB
ALBUMIN SERPL BCP-MCNC: 3.3 G/DL (ref 3.5–5)
ALP SERPL-CCNC: 81 U/L (ref 46–116)
ALT SERPL W P-5'-P-CCNC: 100 U/L (ref 12–78)
ANION GAP SERPL CALCULATED.3IONS-SCNC: 10 MMOL/L (ref 4–13)
AST SERPL W P-5'-P-CCNC: 22 U/L (ref 5–45)
BILIRUB SERPL-MCNC: 0.7 MG/DL (ref 0.2–1)
BUN SERPL-MCNC: 13 MG/DL (ref 5–25)
CALCIUM SERPL-MCNC: 8.6 MG/DL (ref 8.3–10.1)
CHLORIDE SERPL-SCNC: 105 MMOL/L (ref 100–108)
CO2 SERPL-SCNC: 28 MMOL/L (ref 21–32)
CREAT SERPL-MCNC: 0.76 MG/DL (ref 0.6–1.3)
ERYTHROCYTE [DISTWIDTH] IN BLOOD BY AUTOMATED COUNT: 14.8 % (ref 11.6–15.1)
GFR SERPL CREATININE-BSD FRML MDRD: 97 ML/MIN/1.73SQ M
GLUCOSE SERPL-MCNC: 250 MG/DL (ref 65–140)
GLUCOSE SERPL-MCNC: 57 MG/DL (ref 65–140)
GLUCOSE SERPL-MCNC: 72 MG/DL (ref 65–140)
GLUCOSE SERPL-MCNC: 74 MG/DL (ref 65–140)
GLUCOSE SERPL-MCNC: 76 MG/DL (ref 65–140)
GLUCOSE SERPL-MCNC: 86 MG/DL (ref 65–140)
HCT VFR BLD AUTO: 38.8 % (ref 36.5–49.3)
HGB BLD-MCNC: 12.6 G/DL (ref 12–17)
INR PPP: 1.09 (ref 0.86–1.16)
MCH RBC QN AUTO: 29.4 PG (ref 26.8–34.3)
MCHC RBC AUTO-ENTMCNC: 32.5 G/DL (ref 31.4–37.4)
MCV RBC AUTO: 90 FL (ref 82–98)
PLATELET # BLD AUTO: 176 THOUSANDS/UL (ref 149–390)
PMV BLD AUTO: 10.6 FL (ref 8.9–12.7)
POTASSIUM SERPL-SCNC: 3.4 MMOL/L (ref 3.5–5.3)
PROT SERPL-MCNC: 6.4 G/DL (ref 6.4–8.2)
PROTHROMBIN TIME: 14.3 SECONDS (ref 12.1–14.4)
RBC # BLD AUTO: 4.29 MILLION/UL (ref 3.88–5.62)
SODIUM SERPL-SCNC: 143 MMOL/L (ref 136–145)
WBC # BLD AUTO: 6.32 THOUSAND/UL (ref 4.31–10.16)

## 2018-01-24 PROCEDURE — 82948 REAGENT STRIP/BLOOD GLUCOSE: CPT

## 2018-01-24 PROCEDURE — 88305 TISSUE EXAM BY PATHOLOGIST: CPT | Performed by: INTERNAL MEDICINE

## 2018-01-24 PROCEDURE — 99232 SBSQ HOSP IP/OBS MODERATE 35: CPT | Performed by: INTERNAL MEDICINE

## 2018-01-24 PROCEDURE — 85610 PROTHROMBIN TIME: CPT | Performed by: PHYSICIAN ASSISTANT

## 2018-01-24 PROCEDURE — 45385 COLONOSCOPY W/LESION REMOVAL: CPT | Performed by: INTERNAL MEDICINE

## 2018-01-24 PROCEDURE — 80053 COMPREHEN METABOLIC PANEL: CPT | Performed by: PHYSICIAN ASSISTANT

## 2018-01-24 PROCEDURE — 88305 TISSUE EXAM BY PATHOLOGIST: CPT | Performed by: PATHOLOGY

## 2018-01-24 PROCEDURE — 0DBM8ZX EXCISION OF DESCENDING COLON, VIA NATURAL OR ARTIFICIAL OPENING ENDOSCOPIC, DIAGNOSTIC: ICD-10-PCS | Performed by: INTERNAL MEDICINE

## 2018-01-24 PROCEDURE — 85027 COMPLETE CBC AUTOMATED: CPT | Performed by: INTERNAL MEDICINE

## 2018-01-24 RX ORDER — PROPOFOL 10 MG/ML
INJECTION, EMULSION INTRAVENOUS AS NEEDED
Status: DISCONTINUED | OUTPATIENT
Start: 2018-01-24 | End: 2018-01-24 | Stop reason: SURG

## 2018-01-24 RX ORDER — POTASSIUM CHLORIDE 20 MEQ/1
40 TABLET, EXTENDED RELEASE ORAL ONCE
Status: COMPLETED | OUTPATIENT
Start: 2018-01-24 | End: 2018-01-24

## 2018-01-24 RX ORDER — LIDOCAINE HYDROCHLORIDE 10 MG/ML
INJECTION, SOLUTION INFILTRATION; PERINEURAL AS NEEDED
Status: DISCONTINUED | OUTPATIENT
Start: 2018-01-24 | End: 2018-01-24 | Stop reason: SURG

## 2018-01-24 RX ORDER — SODIUM CHLORIDE, SODIUM LACTATE, POTASSIUM CHLORIDE, CALCIUM CHLORIDE 600; 310; 30; 20 MG/100ML; MG/100ML; MG/100ML; MG/100ML
INJECTION, SOLUTION INTRAVENOUS CONTINUOUS PRN
Status: DISCONTINUED | OUTPATIENT
Start: 2018-01-24 | End: 2018-01-24 | Stop reason: SURG

## 2018-01-24 RX ADMIN — SODIUM CHLORIDE, SODIUM LACTATE, POTASSIUM CHLORIDE, AND CALCIUM CHLORIDE: .6; .31; .03; .02 INJECTION, SOLUTION INTRAVENOUS at 13:51

## 2018-01-24 RX ADMIN — MELATONIN TAB 3 MG 6 MG: 3 TAB at 21:51

## 2018-01-24 RX ADMIN — PROPOFOL 50 MG: 10 INJECTION, EMULSION INTRAVENOUS at 14:11

## 2018-01-24 RX ADMIN — APIXABAN 5 MG: 5 TABLET, FILM COATED ORAL at 17:13

## 2018-01-24 RX ADMIN — INSULIN GLARGINE 35 UNITS: 100 INJECTION, SOLUTION SUBCUTANEOUS at 21:51

## 2018-01-24 RX ADMIN — LIDOCAINE HYDROCHLORIDE 50 MG: 10 INJECTION, SOLUTION INFILTRATION; PERINEURAL at 13:59

## 2018-01-24 RX ADMIN — CHLORHEXIDINE GLUCONATE 15 ML: 1.2 RINSE ORAL at 21:51

## 2018-01-24 RX ADMIN — PANTOPRAZOLE SODIUM 40 MG: 40 TABLET, DELAYED RELEASE ORAL at 17:13

## 2018-01-24 RX ADMIN — METOPROLOL TARTRATE 50 MG: 50 TABLET ORAL at 21:51

## 2018-01-24 RX ADMIN — PROPOFOL 100 MG: 10 INJECTION, EMULSION INTRAVENOUS at 13:59

## 2018-01-24 RX ADMIN — POTASSIUM CHLORIDE 40 MEQ: 1500 TABLET, EXTENDED RELEASE ORAL at 17:13

## 2018-01-24 RX ADMIN — LISINOPRIL 5 MG: 5 TABLET ORAL at 09:37

## 2018-01-24 RX ADMIN — PANTOPRAZOLE SODIUM 40 MG: 40 TABLET, DELAYED RELEASE ORAL at 06:51

## 2018-01-24 RX ADMIN — DIGOXIN 125 MCG: 0.12 TABLET ORAL at 09:31

## 2018-01-24 RX ADMIN — CHLORHEXIDINE GLUCONATE 15 ML: 1.2 RINSE ORAL at 09:37

## 2018-01-24 RX ADMIN — METOPROLOL TARTRATE 50 MG: 50 TABLET ORAL at 09:31

## 2018-01-24 RX ADMIN — PROPOFOL 50 MG: 10 INJECTION, EMULSION INTRAVENOUS at 14:04

## 2018-01-24 RX ADMIN — INSULIN LISPRO 2 UNITS: 100 INJECTION, SOLUTION INTRAVENOUS; SUBCUTANEOUS at 21:56

## 2018-01-24 RX ADMIN — FUROSEMIDE 40 MG: 40 TABLET ORAL at 09:31

## 2018-01-24 NOTE — PLAN OF CARE
Pt unable to complete Golytely by midnight, GI on call made aware, MD allows pt to continue to drink Golytely but to be completed by 7am  PT made aware and discussed the importance to complete it in order to have the procedure, pt verbalized understanding but barely sips from the Golytely, will continue to encourage

## 2018-01-24 NOTE — ANESTHESIA PREPROCEDURE EVALUATION
Review of Systems/Medical History  Patient summary reviewed  Chart reviewed      Cardiovascular  EKG reviewed, Exercise tolerance: poor,  Hyperlipidemia, Hypertension , Past MI 0-3 months, CAD, , Dysrhythmias, atrial fibrillation, CHF heart failure unspecified,   Comment: Current admission for NSTEMI, cardiomyopathy with CHF, CAD,  Pulmonary       GI/Hepatic            Endo/Other  Diabetes poorly controlled type 1 Insulin,   Obesity  morbid obesity   GYN       Hematology   Musculoskeletal  Back pain , lumbar pain, Sciatica,        Neurology   Psychology           Physical Exam    Airway    Mallampati score: II  TM Distance: >3 FB  Neck ROM: full     Dental   Comment: Dentition in poor shape,     Cardiovascular  Rhythm: irregular, Rate: normal,     Pulmonary  Breath sounds clear to auscultation, Decreased breath sounds,     Other Findings        Anesthesia Plan  ASA Score- 3     Anesthesia Type- IV sedation with anesthesia with ASA Monitors  Additional Monitors:   Airway Plan:         Plan Factors-Patient not instructed to abstain from smoking on day of procedure  Patient did not smoke on day of surgery  Induction- intravenous  Postoperative Plan-     Informed Consent- Anesthetic plan and risks discussed with patient  I personally reviewed this patient with the CRNA  Discussed and agreed on the Anesthesia Plan with the CRNA  Mo Fowler

## 2018-01-24 NOTE — PROGRESS NOTES
General Cardiology   Progress Note   Jen Frey 60 y o  male MRN: [id number]  Unit/Bed#: -01 Encounter: [id number]        Subjective:   Patient doing well, no chest pain or SOB  HR improved  Scheduled for colonoscopy this afternoon  Objective:   Vitals:  Vitals:    01/24/18 0700   BP: 122/84   Pulse: 91   Resp: 18   Temp: 97 5 °F (36 4 °C)   SpO2: 98%       Body mass index is 44 27 kg/m²  Systolic (75AAT), FUS:234 , Min:103 , IKV:790     Diastolic (80GSL), KVR:02, Min:72, Max:84      Intake/Output Summary (Last 24 hours) at 01/24/18 1050  Last data filed at 01/24/18 0230   Gross per 24 hour   Intake              620 ml   Output             1450 ml   Net             -830 ml     Weight (last 2 days)     Date/Time   Weight    01/24/18 0554  (!)  146 (321 87)    01/23/18 0533  (!)  141 (310 19)    01/22/18 0600  (!)  141 (310 85)              PHYSICAL EXAMS:  General:  Patient is not in acute distress, laying in the bed comfortably, awake, alert responding to commands  Head: Normocephalic, Atraumatic  HEENT: White sclera, pink conjunctiva,  PERRLA,pharynx benign  Neck:  Supple, no neck vein distention, carotids+2/+2 no bruits, thyromegaly, adenopathy  Respiratory: clear to P/A  Cardiovascular:  +irregularly irregular  PMI normal, S1-S2 normal, No  Murmurs, thrills, gallops, rubs   GI:  Abdomen soft nontender   No hepatosplenomegaly, adenopathy, ascites,or rebound tenderness  Extremities: No edema, normal pulses, no calf tenderness, no joint deformities, no venous disease   Integument:  No skin rashes or ulceration  Lymphatic:  No cervical or inguinal lymphadenopathy  Neurologic:  Patient is awake alert, responding to command, well-oriented to time and place and person moving all extremities      LABORATORY RESULTS:      CBC with diff:   Results from last 7 days  Lab Units 01/24/18  0451 01/23/18  0532 01/22/18  1848  01/18/18  0537   WBC Thousand/uL 6 32 6 27 7 08  < > 6 86   HEMOGLOBIN g/dL 12 6 12 6 13 1  < > 12 6   HEMATOCRIT % 38 8 38 6 40 1  < > 37 5   MCV fL 90 90 90  < > 88   PLATELETS Thousands/uL 176 173 214  < > 223   MCH pg 29 4 29 4 29 5  < > 29 6   MCHC g/dL 32 5 32 6 32 7  < > 33 6   RDW % 14 8 14 8 14 9  < > 14 9   MPV fL 10 6 10 7 10 7  < > 10 4   NRBC AUTO /100 WBCs  --   --  0  --  0   < > = values in this interval not displayed      CMP:  Results from last 7 days  Lab Units 01/24/18  0451 01/22/18  0453 01/20/18  0441   SODIUM mmol/L 143 140 140   POTASSIUM mmol/L 3 4* 3 9 3 8   CHLORIDE mmol/L 105 104 103   CO2 mmol/L 28 27 28   ANION GAP mmol/L 10 9 9   BUN mg/dL 13 24 26*   CREATININE mg/dL 0 76 1 01 1 02   GLUCOSE RANDOM mg/dL 72 199* 273*   CALCIUM mg/dL 8 6 8 8 8 6   AST U/L 22 24 46*   ALT U/L 100* 134* 195*   ALK PHOS U/L 81 101 112   TOTAL PROTEIN g/dL 6 4 6 9 6 6   BILIRUBIN TOTAL mg/dL 0 70 0 40 0 50   EGFR ml/min/1 73sq m 97 79 78       BMP:  Results from last 7 days  Lab Units 01/24/18  0451 01/22/18  0453 01/20/18  0441   SODIUM mmol/L 143 140 140   POTASSIUM mmol/L 3 4* 3 9 3 8   CHLORIDE mmol/L 105 104 103   CO2 mmol/L 28 27 28   BUN mg/dL 13 24 26*   CREATININE mg/dL 0 76 1 01 1 02   GLUCOSE RANDOM mg/dL 72 199* 273*   CALCIUM mg/dL 8 6 8 8 8 6           Results from last 7 days  Lab Units 01/20/18  0441   NT-PRO BNP pg/mL 1,453*        Results from last 7 days  Lab Units 01/20/18  0441   MAGNESIUM mg/dL 1 6               Results from last 7 days  Lab Units 01/24/18  0451 01/18/18  0537   INR  1 09 1 06       Lipid Profile:   Lab Results   Component Value Date    CHOL 109 01/20/2018     Lab Results   Component Value Date    HDL 32 (L) 01/20/2018     Lab Results   Component Value Date    LDLCALC 58 01/20/2018     Lab Results   Component Value Date    TRIG 96 01/20/2018       Cardiac testing:   Results for orders placed during the hospital encounter of 01/13/18   Echo complete with contrast if indicated    Narrative [address] [facility]  [address] [address] [address] [place] [address]  [phone]    Transthoracic Echocardiogram  2D, M-mode, and Color Doppler    Study date:  2018    Patient: Landen Hedrick  MR number: [id number]  Account number: [de-identified]  : 1955  Age: 60 years  Gender: Male  Status: Inpatient  Location: Bedside  Height: 71 in  Weight: 313 lb  BP: 145/ 95 mmHg    Indications: Atrial fibrillation  Diagnoses: I48 0 - Atrial fibrillation    Sonographer:  Frazier RCS  Interpreting Physician:  Matthew Christian MD  Referring Physician:  Matthew Christian MD  Group:  [facility]    SUMMARY    LEFT VENTRICLE:  The ventricle was mildly dilated  Systolic function was markedly reduced  Ejection fraction was estimated to be 30 %  There was severe diffuse hypokinesis  MITRAL VALVE:  There was mild regurgitation  TRICUSPID VALVE:  There was mild regurgitation  PULMONIC VALVE:  There was mild regurgitation  HISTORY: PRIOR HISTORY: Rapid atrial fibrillation  Tachycardia  Shortness of breath  Risk factors: hypertension, diabetes, and morbid obesity  PROCEDURE: The procedure was performed at the bedside  This was a routine study  The transthoracic approach was used  The study included complete 2D imaging, M-mode, and color Doppler  The heart rate was 116 bpm, at the start of the study  Images were obtained from the parasternal, apical, and subcostal acoustic windows  Echocardiographic views were limited due to poor acoustic window availability, decreased penetration, and lung interference  This was a technically  difficult study  LEFT VENTRICLE: The ventricle was mildly dilated  Systolic function was markedly reduced  Ejection fraction was estimated to be 30 %  There was severe diffuse hypokinesis   DOPPLER: Left ventricular diastolic function parameters were  abnormal     RIGHT VENTRICLE: The size was normal  Systolic function was normal  Wall thickness was normal     LEFT ATRIUM: Size was normal     RIGHT ATRIUM: Size was normal     MITRAL VALVE: Valve structure was normal  There was normal leaflet separation  DOPPLER: The transmitral velocity was within the normal range  There was no evidence for stenosis  There was mild regurgitation  AORTIC VALVE: The valve was trileaflet  Leaflets exhibited normal thickness and normal cuspal separation  DOPPLER: Transaortic velocity was within the normal range  There was no evidence for stenosis  There was no regurgitation  TRICUSPID VALVE: The valve structure was normal  There was normal leaflet separation  DOPPLER: The transtricuspid velocity was within the normal range  There was no evidence for stenosis  There was mild regurgitation  PULMONIC VALVE: Leaflets exhibited normal thickness, no calcification, and normal cuspal separation  DOPPLER: The transpulmonic velocity was within the normal range  There was mild regurgitation  PERICARDIUM: There was no pericardial effusion  The pericardium was normal in appearance  AORTA: The root exhibited normal size  SYSTEMIC VEINS: IVC: The inferior vena cava was not well visualized  SYSTEM MEASUREMENT TABLES    2D  %FS: 15 %  Ao Diam: 4 cm  EDV(Teich): 188 4 ml  EF(Teich): 31 1 %  ESV(Teich): 129 8 ml  IVSd: 0 7 cm  LA Diam: 4 6 cm  LVIDd: 6 1 cm  LVIDs: 5 2 cm  LVPWd: 1 1 cm  SV(Teich): 58 6 ml    CW  TR Vmax: 2 9 m/s  TR maxP 8 mmHg    [facility] Accredited Echocardiography Laboratory    Prepared and electronically signed by    Daron Norton MD  Signed 2018 17:32:18    Addendum Date: 2018 12:57:19 PM  Addendum for re-trigger of HL7 to Epic for Fianl status      Addendum entered by: Kenton Roman         Meds/Allergies   all current active meds have been reviewed and current meds:   Current Facility-Administered Medications   Medication Dose Route Frequency    [MAR Hold] acetaminophen (TYLENOL) tablet 650 mg  650 mg Oral Q6H PRN    [MAR Hold] albuterol inhalation solution 2 5 mg  2 5 mg Nebulization Q4H PRN    [MAR Hold] chlorhexidine (PERIDEX) 0 12 % oral rinse 15 mL  15 mL Swish & Spit Q12H [address]    [MAR Hold] digoxin (LANOXIN) tablet 125 mcg  125 mcg Oral Daily    [MAR Hold] furosemide (LASIX) tablet 40 mg  40 mg Oral Daily    [MAR Hold] insulin glargine (LANTUS) subcutaneous injection 35 Units  35 Units Subcutaneous HS    [MAR Hold] insulin lispro (HumaLOG) 100 units/mL subcutaneous injection 1-5 Units  1-5 Units Subcutaneous HS    [MAR Hold] insulin lispro (HumaLOG) 100 units/mL subcutaneous injection 2-12 Units  2-12 Units Subcutaneous TID AC    [MAR Hold] lisinopril (ZESTRIL) tablet 5 mg  5 mg Oral Daily    [MAR Hold] melatonin tablet 6 mg  6 mg Oral HS    [MAR Hold] metoprolol tartrate (LOPRESSOR) tablet 50 mg  50 mg Oral Q12H [address]    [MAR Hold] pantoprazole (PROTONIX) EC tablet 40 mg  40 mg Oral BID AC    [MAR Hold] senna (SENOKOT) tablet 8 6 mg  1 tablet Oral HS     Prescriptions Prior to Admission   Medication    amLODIPine (NORVASC) 5 mg tablet    insulin glargine (LANTUS) 100 units/mL subcutaneous injection    losartan (COZAAR) 100 MG tablet    metFORMIN (GLUMETZA) 1000 MG (MOD) 24 hr tablet    simvastatin (ZOCOR) 40 mg tablet       Assessment/Plan:  1  Atrial fibrillation:  Heart rate controlled on digoxin in Lopressor, on average 80s  Eliquis on hold due to GI bleed  Patient will get colonoscopy today for GI workup  2   NSTEMI type 2:  Secondary to atrial fibrillation with RVR and acute Congestive heart failure  3   Acute systolic CHF/nonischemic cardiomyopathy:  Due to rapid atrial fibrillation  EF 30 percent with severe diffuse hypokinesis  On Lopressor and lisinopril  Life vest before discharge  4   Hypertension:  Last recorded /77  Continue present regimen  ** Please Note: Dragon 360 Dictation voice to text software may have been used in the creation of this document   **

## 2018-01-24 NOTE — DISCHARGE INSTRUCTIONS
Colonoscopy   WHAT YOU NEED TO KNOW:   A colonoscopy is a procedure to examine the inside of your colon (intestine) with a scope  Polyps or tissue growths may have been removed during your colonoscopy  It is normal to feel bloated and to have some abdominal discomfort  You should be passing gas  If you have hemorrhoids or you had polyps removed, you may have a small amount of bleeding  DISCHARGE INSTRUCTIONS:   Seek care immediately if:   · You have a large amount of bright red blood in your bowel movements  · Your abdomen is hard and firm and you have severe pain  · You have sudden trouble breathing  Contact your healthcare provider if:   · You develop a rash or hives  · You have a fever within 24 hours of your procedure       · You have not had a bowel movement for 3 days after your procedure  · You have questions or concerns about your condition or care  Activity:   · Do not lift, strain, or run  for 3 days after your procedure  · Rest after your procedure  You have been given medicine to relax you  Do not  drive or make important decisions until the day after your procedure  Return to your normal activity as directed  · Relieve gas and discomfort from bloating  by lying on your right side with a heating pad on your abdomen  You may need to take short walks to help the gas move out  Eat small meals until bloating is relieved  If you had polyps removed: For 7 days after your procedure:  · Do not  go on long car rides  Follow up with your healthcare provider as directed:  Write down your questions so you remember to ask them during your visits  © 2017 [address] [employer] is for End User's use only and may not be sold, redistributed or otherwise used for commercial purposes  All illustrations and images included in CareNotes® are the copyrighted property of [employer]  or Thomas Rouse  The above information is an  only   It is not intended as medical advice for individual conditions or treatments  Talk to your doctor, nurse or pharmacist before following any medical regimen to see if it is safe and effective for you

## 2018-01-24 NOTE — ANESTHESIA POSTPROCEDURE EVALUATION
Post-Op Assessment Note      CV Status:  Stable    Mental Status:  Alert and awake    Hydration Status:  Euvolemic    PONV Controlled:  Controlled    Airway Patency:  Patent    Post Op Vitals Reviewed: Yes          Staff: CRNA       Comments: vss sv nonobstructed uneventful          BP 96/56 (01/24/18 1418)    Temp 97 7 °F (36 5 °C) (01/24/18 1418)    Pulse 84 (01/24/18 1418)   Resp 22 (01/24/18 1418)    SpO2 96 % (01/24/18 1418)

## 2018-01-24 NOTE — PROGRESS NOTES
[facility] Progress Note  Patient: Derek Del Castillo 60 y o  male   MRN: [id number]  PCP: No primary care provider on file  Unit/Bed#: MS [facility] Encounter: [id number]  Date Of Visit: 18    Assessment/Plan:    1  Acute combined systolic and diastolic heart failure EF 30%, oral Lasix, BEATRIZ was canceled because of lack of anesthesia clearance, cardiology range for life vest, patient had some bleeding on Eliquis therefore underwent a colonoscopy today  2   Rectal bleed most likely hemorrhoidal, also polyps removed hemoglobin stable Eliquis started after discussion with GI, will monitor CBC if hemoglobin stable tomorrow will discharge  3   Type 2 diabetes sugars have been variably controlled, A1c of 9, sugars were low I will try today, but the patient is NPO for the procedure I will continue the current dose of Lantus will not do b i d  dosing today  4   Blood pressure is now acceptable I cut down the dose of Lopressor, holding parameters on the rest of the medications  Will do ambulatory pulse ox tomorrow    VTE Pharmacologic Prophylaxis:   Pharmacologic: Apixaban (Eliquis) hold  Mechanical VTE Prophylaxis in Place: Yes    Patient Centered Rounds: I have performed bedside rounds with nursing staff today      Discussions with Specialists or Other Care Team Provider:  Yes    Education and Discussions with Family / Patient:  Yes      Current Length of Stay: 10 day(s)    Current Patient Status: Inpatient   Certification Statement: The patient will continue to require additional inpatient hospital stay due to Heart failure/atrial fibrillation/assess need for oxygen at home    Discharge Plan:  Home when stable    Code Status: Level 1 - Full Code      Subjective:   Pleasant middle-aged male no acute distress, discussed the plan in detail with him    Objective:     Vitals:   Temp (24hrs), Av 9 °F (36 6 °C), Min:97 2 °F (36 2 °C), Max:98 7 °F (37 1 °C)    HR:  [80-99] 89  Resp:  [16-22] 18  BP: ()/(56-85) 107/64  SpO2:  [95 %-98 %] 97 %  Body mass index is 44 27 kg/m²  Input and Output Summary (last 24 hours): Intake/Output Summary (Last 24 hours) at 01/24/18 1528  Last data filed at 01/24/18 1457   Gross per 24 hour   Intake              200 ml   Output             3950 ml   Net            -3750 ml           Physical Exam:   General middle-aged male no acute distress  CVS S1-S2 heard  Chest clear to auscultation  Abdomen obese nontender  Extremities no edema        Additional Data:     Labs:      Results from last 7 days  Lab Units 01/24/18  0451  01/22/18  1848   WBC Thousand/uL 6 32  < > 7 08   HEMOGLOBIN g/dL 12 6  < > 13 1   HEMATOCRIT % 38 8  < > 40 1   PLATELETS Thousands/uL 176  < > 214   NEUTROS PCT %  --   --  68   LYMPHS PCT %  --   --  20   MONOS PCT %  --   --  8   EOS PCT %  --   --  2   < > = values in this interval not displayed  Results from last 7 days  Lab Units 01/24/18  0451   SODIUM mmol/L 143   POTASSIUM mmol/L 3 4*   CHLORIDE mmol/L 105   CO2 mmol/L 28   BUN mg/dL 13   CREATININE mg/dL 0 76   CALCIUM mg/dL 8 6   TOTAL PROTEIN g/dL 6 4   BILIRUBIN TOTAL mg/dL 0 70   ALK PHOS U/L 81   ALT U/L 100*   AST U/L 22   GLUCOSE RANDOM mg/dL 72       Results from last 7 days  Lab Units 01/24/18  0451   INR  1 09       * I Have Reviewed All Lab Data Listed Above  * Additional Pertinent Lab Tests Reviewed:  All Labs Within Last 24 Hours Reviewed    DeleteRecent Cultures (last 7 days):           Last 24 Hours Medication List:     apixaban 5 mg Oral BID   chlorhexidine 15 mL Swish & Spit Q12H [address]   digoxin 125 mcg Oral Daily   furosemide 40 mg Oral Daily   insulin glargine 35 Units Subcutaneous HS   insulin lispro 1-5 Units Subcutaneous HS   insulin lispro 2-12 Units Subcutaneous TID AC   lisinopril 5 mg Oral Daily   melatonin 6 mg Oral HS   metoprolol tartrate 50 mg Oral Q12H KENA   pantoprazole 40 mg Oral BID AC   potassium chloride 40 mEq Oral Once   senna 1 tablet Oral HS        Today, Patient Was Seen By: Ramond Osler, MD    ** Please Note: Dragon 360 Dictation voice to text software may have been used in the creation of this document   **

## 2018-01-24 NOTE — OP NOTE
**** GI/ENDOSCOPY REPORT ****     PATIENT NAME: Sohan Coronel ------ VISIT ID:  Patient ID:   [id number] YOB: 1955     INTRODUCTION: Colonoscopy - A 60 male patient presents for an inpatient   Colonoscopy at [facility]  PREVIOUS COLONOSCOPY: No prior colonoscopy  INDICATIONS: Surveillance  Rectal bleeding  CONSENT:  The benefits, risks, and alternatives to the procedure were   discussed and informed consent was obtained from the patient  PREPARATION: EKG, pulse, pulse oximetry and blood pressure were monitored   throughout the procedure  The patient was identified by myself both   verbally and by visual inspection of ID band  ASA Classification: Class 4   - Patient has severe systemic disturbance that is life threatening with or   without surgery  MEDICATIONS: Anesthesia-check records Anesthesia administered by   anesthesiologist      PROCEDURE:  The endoscope was passed without difficulty through the anus   under direct visualization and advanced to the cecum, confirmed by   appendiceal orifice and ileocecal valve  The scope was withdrawn and the   mucosa was carefully examined  The quality of the preparation was good  Cecal Intubation Time: 5 minutes(s) Scope Withdrawal Time: 8 minutes(s)     RECTAL EXAM: Normal rectal exam  Normal sphincter tone  FINDINGS:  A single semi-pedunculated polyp, measuring 11 mm in size, was   found in the descending colon  The polyp was completely removed by snare   cautery polypectomy  Retrieved  To control bleeding, 1 clip was applied  There was evidence of moderately severe diverticulosis in the descending   colon and sigmoid colon  On retroflexed view, small internal hemorrhoids   were found in the distal rectum  No recent or old blood noted in the   colon  Suspect Rectal bleeding was likely hemorrhoidal  Colonic mucosa   appeared unremarkable  COMPLICATIONS: There were no complications       IMPRESSIONS: A single semi-pedunculated polyp found in the descending   colon; removed by snare cautery polypectomy  Clips were applied to control   bleeding  Moderately severe diverticulosis found in the descending colon   and sigmoid colon  Internal hemorrhoids in the distal rectum  No recent or   old blood noted in the colon  Suspect Rectal bleeding was likely   hemorrhoidal  Colonic mucosa appeared unremarkable  RECOMMENDATIONS: Return to floor  Start high fiber diet  Follow-up on the   results of the biopsy specimens  May resume anticoagulation as necessary   while monitoring H/H      ESTIMATED BLOOD LOSS: Insignificant  PATHOLOGY SPECIMENS: Completely removed by snare cautery polypectomy  Yes     PROCEDURE CODES: : Colonoscopy with snare polypectomy     ICD-9 Codes: 569 3 Hemorrhage of rectum and anus 211 3 Benign neoplasm of   colon 562 10 Diverticulosis of colon (without mention of hemorrhage)     ICD-10 Codes: K62 5 Hemorrhage of anus and rectum K63 5 Polyp of colon K57   Diverticular disease of intestine K64 9 Unspecified hemorrhoids     PERFORMED BY: MELE Us  on 01/24/2018  Version 1, electronically signed by MELE Michael  on 01/24/2018   at 14:18

## 2018-01-25 ENCOUNTER — APPOINTMENT (INPATIENT)
Dept: ULTRASOUND IMAGING | Facility: HOSPITAL | Age: 63
DRG: 281 | End: 2018-01-25
Payer: COMMERCIAL

## 2018-01-25 LAB
ERYTHROCYTE [DISTWIDTH] IN BLOOD BY AUTOMATED COUNT: 14.8 % (ref 11.6–15.1)
GLUCOSE SERPL-MCNC: 217 MG/DL (ref 65–140)
GLUCOSE SERPL-MCNC: 235 MG/DL (ref 65–140)
GLUCOSE SERPL-MCNC: 271 MG/DL (ref 65–140)
GLUCOSE SERPL-MCNC: 284 MG/DL (ref 65–140)
HCT VFR BLD AUTO: 37.7 % (ref 36.5–49.3)
HCT VFR BLD AUTO: 39.5 % (ref 36.5–49.3)
HGB BLD-MCNC: 12.3 G/DL (ref 12–17)
HGB BLD-MCNC: 12.7 G/DL (ref 12–17)
MCH RBC QN AUTO: 29.7 PG (ref 26.8–34.3)
MCHC RBC AUTO-ENTMCNC: 32.6 G/DL (ref 31.4–37.4)
MCV RBC AUTO: 91 FL (ref 82–98)
PLATELET # BLD AUTO: 175 THOUSANDS/UL (ref 149–390)
PMV BLD AUTO: 11.1 FL (ref 8.9–12.7)
RBC # BLD AUTO: 4.14 MILLION/UL (ref 3.88–5.62)
WBC # BLD AUTO: 6.32 THOUSAND/UL (ref 4.31–10.16)

## 2018-01-25 PROCEDURE — 76705 ECHO EXAM OF ABDOMEN: CPT

## 2018-01-25 PROCEDURE — 82948 REAGENT STRIP/BLOOD GLUCOSE: CPT

## 2018-01-25 PROCEDURE — 85027 COMPLETE CBC AUTOMATED: CPT | Performed by: INTERNAL MEDICINE

## 2018-01-25 PROCEDURE — 85014 HEMATOCRIT: CPT | Performed by: INTERNAL MEDICINE

## 2018-01-25 PROCEDURE — 85018 HEMOGLOBIN: CPT | Performed by: INTERNAL MEDICINE

## 2018-01-25 PROCEDURE — 99232 SBSQ HOSP IP/OBS MODERATE 35: CPT | Performed by: PHYSICIAN ASSISTANT

## 2018-01-25 PROCEDURE — 99231 SBSQ HOSP IP/OBS SF/LOW 25: CPT | Performed by: INTERNAL MEDICINE

## 2018-01-25 RX ORDER — HYDROCORTISONE ACETATE 25 MG/1
25 SUPPOSITORY RECTAL 2 TIMES DAILY
Status: DISCONTINUED | OUTPATIENT
Start: 2018-01-25 | End: 2018-01-26 | Stop reason: HOSPADM

## 2018-01-25 RX ORDER — INSULIN GLARGINE 100 [IU]/ML
42 INJECTION, SOLUTION SUBCUTANEOUS
Status: DISCONTINUED | OUTPATIENT
Start: 2018-01-25 | End: 2018-01-26 | Stop reason: HOSPADM

## 2018-01-25 RX ADMIN — HYDROCORTISONE ACETATE 25 MG: 25 SUPPOSITORY RECTAL at 18:12

## 2018-01-25 RX ADMIN — CHLORHEXIDINE GLUCONATE 15 ML: 1.2 RINSE ORAL at 21:45

## 2018-01-25 RX ADMIN — MELATONIN TAB 3 MG 6 MG: 3 TAB at 21:45

## 2018-01-25 RX ADMIN — INSULIN GLARGINE 42 UNITS: 100 INJECTION, SOLUTION SUBCUTANEOUS at 21:45

## 2018-01-25 RX ADMIN — INSULIN LISPRO 4 UNITS: 100 INJECTION, SOLUTION INTRAVENOUS; SUBCUTANEOUS at 08:56

## 2018-01-25 RX ADMIN — INSULIN LISPRO 4 UNITS: 100 INJECTION, SOLUTION INTRAVENOUS; SUBCUTANEOUS at 12:58

## 2018-01-25 RX ADMIN — INSULIN LISPRO 2 UNITS: 100 INJECTION, SOLUTION INTRAVENOUS; SUBCUTANEOUS at 21:50

## 2018-01-25 RX ADMIN — PANTOPRAZOLE SODIUM 40 MG: 40 TABLET, DELAYED RELEASE ORAL at 18:11

## 2018-01-25 RX ADMIN — PANTOPRAZOLE SODIUM 40 MG: 40 TABLET, DELAYED RELEASE ORAL at 06:11

## 2018-01-25 RX ADMIN — CHLORHEXIDINE GLUCONATE 15 ML: 1.2 RINSE ORAL at 08:48

## 2018-01-25 RX ADMIN — DIGOXIN 125 MCG: 0.12 TABLET ORAL at 08:47

## 2018-01-25 RX ADMIN — APIXABAN 5 MG: 5 TABLET, FILM COATED ORAL at 08:48

## 2018-01-25 RX ADMIN — INSULIN LISPRO 6 UNITS: 100 INJECTION, SOLUTION INTRAVENOUS; SUBCUTANEOUS at 18:13

## 2018-01-25 RX ADMIN — APIXABAN 5 MG: 5 TABLET, FILM COATED ORAL at 18:11

## 2018-01-25 NOTE — PROGRESS NOTES
General Cardiology   Progress Note   [facility] 60 y o  male MRN: [id number]  Unit/Bed#: -01 Encounter: [id number]        Subjective:   As per Gastroenterology, patient had hemorrhoidal bleed  When evaluated, patient's only complaint was that he continues to bleed  Denies chest pain, shortness of breath, palpitations, leg swelling  Objective:   Vitals:  Vitals:    01/25/18 1100   BP: 119/65   Pulse: 79   Resp: 21   Temp: (!) 97 4 °F (36 3 °C)   SpO2: 94%       Body mass index is 43 54 kg/m²  Systolic (27BJE), RAQ:841 , Min:82 , MWK:970     Diastolic (72ZFU), LLP:34, Min:55, Max:85      Intake/Output Summary (Last 24 hours) at 01/25/18 1055  Last data filed at 01/25/18 0100   Gross per 24 hour   Intake              560 ml   Output             3100 ml   Net            -2540 ml     Weight (last 2 days)     Date/Time   Weight    01/25/18 0600  (!)  144 (316 58)    01/24/18 0554  (!)  146 (321 87)    01/23/18 0533  (!)  141 (310 19)              Telemetry Review:  Atrial fibrillation, HR 80s to 90s    PHYSICAL EXAMS:  General:  Patient is not in acute distress, laying in the bed comfortably, awake, alert responding to commands  Head: Normocephalic, Atraumatic  HEENT: White sclera, pink conjunctiva,  PERRLA,pharynx benign  Neck:  Supple, no neck vein distention, carotids+2/+2 no bruits, thyromegaly, adenopathy  Respiratory: clear to P/A  Cardiovascular:  + irregular irregular  S1-S2 normal, No  Murmurs, thrills, gallops, rubs   GI:  Abdomen soft nontender   No hepatosplenomegaly, adenopathy, ascites,or rebound tenderness  Extremities: No edema, normal pulses, no calf tenderness, no joint deformities, no venous disease   Integument:  No skin rashes or ulceration  Lymphatic:  No cervical or inguinal lymphadenopathy  Neurologic:  Patient is awake alert, responding to command, well-oriented to time and place and person moving all extremities      LABORATORY RESULTS:      CBC with diff:   Results from last 7 days  Lab Units 01/25/18  0510 01/24/18  0451 01/23/18  0532 01/22/18  1848   WBC Thousand/uL 6 32 6 32 6 27 7 08   HEMOGLOBIN g/dL 12 3 12 6 12 6 13 1   HEMATOCRIT % 37 7 38 8 38 6 40 1   MCV fL 91 90 90 90   PLATELETS Thousands/uL 175 176 173 214   MCH pg 29 7 29 4 29 4 29 5   MCHC g/dL 32 6 32 5 32 6 32 7   RDW % 14 8 14 8 14 8 14 9   MPV fL 11 1 10 6 10 7 10 7   NRBC AUTO /100 WBCs  --   --   --  0       CMP:  Results from last 7 days  Lab Units 01/24/18  0451 01/22/18  0453 01/20/18  0441   SODIUM mmol/L 143 140 140   POTASSIUM mmol/L 3 4* 3 9 3 8   CHLORIDE mmol/L 105 104 103   CO2 mmol/L 28 27 28   ANION GAP mmol/L 10 9 9   BUN mg/dL 13 24 26*   CREATININE mg/dL 0 76 1 01 1 02   GLUCOSE RANDOM mg/dL 72 199* 273*   CALCIUM mg/dL 8 6 8 8 8 6   AST U/L 22 24 46*   ALT U/L 100* 134* 195*   ALK PHOS U/L 81 101 112   TOTAL PROTEIN g/dL 6 4 6 9 6 6   BILIRUBIN TOTAL mg/dL 0 70 0 40 0 50   EGFR ml/min/1 73sq m 97 79 78       BMP:  Results from last 7 days  Lab Units 01/24/18  0451 01/22/18  0453 01/20/18  0441   SODIUM mmol/L 143 140 140   POTASSIUM mmol/L 3 4* 3 9 3 8   CHLORIDE mmol/L 105 104 103   CO2 mmol/L 28 27 28   BUN mg/dL 13 24 26*   CREATININE mg/dL 0 76 1 01 1 02   GLUCOSE RANDOM mg/dL 72 199* 273*   CALCIUM mg/dL 8 6 8 8 8 6           Results from last 7 days  Lab Units 01/20/18  0441   NT-PRO BNP pg/mL 8,756*        Results from last 7 days  Lab Units 01/20/18  0441   MAGNESIUM mg/dL 1 6               Results from last 7 days  Lab Units 01/24/18  0451   INR  1 09       Lipid Profile:   Lab Results   Component Value Date    CHOL 109 01/20/2018     Lab Results   Component Value Date    HDL 32 (L) 01/20/2018     Lab Results   Component Value Date    LDLCALC 58 01/20/2018     Lab Results   Component Value Date    TRIG 96 01/20/2018       Cardiac testing:   Results for orders placed during the hospital encounter of 01/13/18   Echo complete with contrast if indicated    [facility] - [address] [address]  [phone]    Transthoracic Echocardiogram  2D, M-mode, and Color Doppler    Study date:  2018    Patient: Collin Shah  MR number: [id number]  Account number: [de-identified]  : 1955  Age: 60 years  Gender: Male  Status: Inpatient  Location: Bedside  Height: 71 in  Weight: 313 lb  BP: 145/ 95 mmHg    Indications: Atrial fibrillation  Diagnoses: I48 0 - Atrial fibrillation    Sonographer:  Sanchez RCS  Interpreting Physician:  Barb Capps MD  Referring Physician:  Barb Capps MD  Group:  [facility]    SUMMARY    LEFT VENTRICLE:  The ventricle was mildly dilated  Systolic function was markedly reduced  Ejection fraction was estimated to be 30 %  There was severe diffuse hypokinesis  MITRAL VALVE:  There was mild regurgitation  TRICUSPID VALVE:  There was mild regurgitation  PULMONIC VALVE:  There was mild regurgitation  HISTORY: PRIOR HISTORY: Rapid atrial fibrillation  Tachycardia  Shortness of breath  Risk factors: hypertension, diabetes, and morbid obesity  PROCEDURE: The procedure was performed at the bedside  This was a routine study  The transthoracic approach was used  The study included complete 2D imaging, M-mode, and color Doppler  The heart rate was 116 bpm, at the start of the study  Images were obtained from the parasternal, apical, and subcostal acoustic windows  Echocardiographic views were limited due to poor acoustic window availability, decreased penetration, and lung interference  This was a technically  difficult study  LEFT VENTRICLE: The ventricle was mildly dilated  Systolic function was markedly reduced  Ejection fraction was estimated to be 30 %  There was severe diffuse hypokinesis   DOPPLER: Left ventricular diastolic function parameters were  abnormal     RIGHT VENTRICLE: The size was normal  Systolic function was normal  Wall thickness was normal     LEFT ATRIUM: Size was normal     RIGHT ATRIUM: Size was normal     MITRAL VALVE: Valve structure was normal  There was normal leaflet separation  DOPPLER: The transmitral velocity was within the normal range  There was no evidence for stenosis  There was mild regurgitation  AORTIC VALVE: The valve was trileaflet  Leaflets exhibited normal thickness and normal cuspal separation  DOPPLER: Transaortic velocity was within the normal range  There was no evidence for stenosis  There was no regurgitation  TRICUSPID VALVE: The valve structure was normal  There was normal leaflet separation  DOPPLER: The transtricuspid velocity was within the normal range  There was no evidence for stenosis  There was mild regurgitation  PULMONIC VALVE: Leaflets exhibited normal thickness, no calcification, and normal cuspal separation  DOPPLER: The transpulmonic velocity was within the normal range  There was mild regurgitation  PERICARDIUM: There was no pericardial effusion  The pericardium was normal in appearance  AORTA: The root exhibited normal size  SYSTEMIC VEINS: IVC: The inferior vena cava was not well visualized  SYSTEM MEASUREMENT TABLES    2D  %FS: 15 %  Ao Diam: 4 cm  EDV(Teich): 188 4 ml  EF(Teich): 31 1 %  ESV(Teich): 129 8 ml  IVSd: 0 7 cm  LA Diam: 4 6 cm  LVIDd: 6 1 cm  LVIDs: 5 2 cm  LVPWd: 1 1 cm  SV(Teich): 58 6 ml    CW  TR Vmax: 2 9 m/s  TR maxP 8 mmHg    [employer] Accredited Echocardiography Laboratory    Prepared and electronically signed by    Jodi Luz MD  Signed 2018 17:32:18    Addendum Date: 2018 12:57:19 PM  Addendum for re-trigger of HL7 to Epic for Fianl status      Addendum entered by: Merle Patel       Meds/Allergies   all current active meds have been reviewed and current meds:   Current Facility-Administered Medications   Medication Dose Route Frequency    acetaminophen (TYLENOL) tablet 650 mg  650 mg Oral Q6H PRN    albuterol inhalation solution 2 5 mg  2 5 mg Nebulization Q4H PRN    apixaban (ELIQUIS) tablet 5 mg  5 mg Oral BID    chlorhexidine (PERIDEX) 0 12 % oral rinse 15 mL  15 mL Swish & Spit Q12H [address]    digoxin (LANOXIN) tablet 125 mcg  125 mcg Oral Daily    furosemide (LASIX) tablet 40 mg  40 mg Oral Daily    insulin glargine (LANTUS) subcutaneous injection 35 Units  35 Units Subcutaneous HS    insulin lispro (HumaLOG) 100 units/mL subcutaneous injection 1-5 Units  1-5 Units Subcutaneous HS    insulin lispro (HumaLOG) 100 units/mL subcutaneous injection 2-12 Units  2-12 Units Subcutaneous TID AC    lisinopril (ZESTRIL) tablet 5 mg  5 mg Oral Daily    melatonin tablet 6 mg  6 mg Oral HS    metoprolol tartrate (LOPRESSOR) tablet 50 mg  50 mg Oral Q12H KENA    pantoprazole (PROTONIX) EC tablet 40 mg  40 mg Oral BID AC    senna (SENOKOT) tablet 8 6 mg  1 tablet Oral HS     Prescriptions Prior to Admission   Medication    amLODIPine (NORVASC) 5 mg tablet    insulin glargine (LANTUS) 100 units/mL subcutaneous injection    losartan (COZAAR) 100 MG tablet    metFORMIN (GLUMETZA) 1000 MG (MOD) 24 hr tablet    simvastatin (ZOCOR) 40 mg tablet       Assessment/Plan:  1  Atrial fibrillation:  Heart rate controlled on digoxin in Lopressor, on average 80s  As per Gastroenterology, patient has GI bleed was hemorrhoidal and Eliquis was restarted  Patient continues to have significant bleeding  Will await GI input, we will continue Eliquis for now       2  NSTEMI type 2:  Secondary to atrial fibrillation with RVR and acute Congestive heart failure      3  Acute systolic CHF/nonischemic cardiomyopathy:  Due to rapid atrial fibrillation  EF 30%with severe diffuse hypokinesis  On Lopressor and lisinopril  Life vest before discharge      4  Hypertension:  Last recorded /65  Continue present regimen  Counseling / Coordination of Care  Total floor / unit time spent today 35 minutes    Greater than 50% of total time was spent with the patient and / or family counseling and / or coordination of care  ** Please Note: Dragon 360 Dictation voice to text software may have been used in the creation of this document   **

## 2018-01-25 NOTE — PROGRESS NOTES
[facility] Progress Note  Patient: Blair Barahona 60 y o  male   MRN: [id number]  PCP: No primary care provider on file  Unit/Bed#: -01 Encounter: [id number]  Date Of Visit: 18    Assessment/Plan:  1  Acute combined systolic and diastolic heart failure, EF 30% oral Lasix, BEATRIZ was canceled because of lack of anesthesia clearance, cardiology arranged for LifeVest   Cardiology has evaluated the patient today as well  2   Status post colonoscopy yesterday rectal bleeding likely hemorrhoidal, continued to have some bleed this morning, GI is aware, will continue Eliquis and monitor hemoglobin hematocrit avoid constipation stool softeners  But will continue to monitor in the hospital for today     3  Check hemoglobin hematocrit at least every 12 hours    4  Dm-2 , low sugars yeterday , high this am , increase lantus to 42 units            Will do ambulatory pulse ox tomorrow    VTE Pharmacologic Prophylaxis:   Pharmacologic: Apixaban (Eliquis) hold  Mechanical VTE Prophylaxis in Place: Yes    Patient Centered Rounds: I have performed bedside rounds with nursing staff today  Discussions with Specialists or Other Care Team Provider:  Yes    Education and Discussions with Family / Patient:  Yes      Current Length of Stay: 11 day(s)    Current Patient Status: Inpatient   Certification Statement: The patient will continue to require additional inpatient hospital stay due to Heart failure/atrial fibrillation/assess need for oxygen at home    Discharge Plan:  Home when stable    Code Status: Level 1 - Full Code      Subjective:   Pleasant middle-aged male no distress    Objective:     Vitals:   Temp (24hrs), Av 6 °F (36 4 °C), Min:97 4 °F (36 3 °C), Max:98 1 °F (36 7 °C)    HR:  [79-98] 79  Resp:  [18-22] 21  BP: ()/(55-72) 119/65  SpO2:  [92 %-97 %] 94 %  Body mass index is 43 54 kg/m²  Input and Output Summary (last 24 hours):        Intake/Output Summary (Last 24 hours) at 01/25/18 1301  Last data filed at 01/25/18 0847   Gross per 24 hour   Intake             1040 ml   Output             1050 ml   Net              -10 ml           Physical Exam:   General - middle aged male , no distress  cvs- s1s2 , no rubs  Chest - clear  Abdomen , obese, NT          Additional Data:     Labs:      Results from last 7 days  Lab Units 01/25/18  0510  01/22/18  1848   WBC Thousand/uL 6 32  < > 7 08   HEMOGLOBIN g/dL 12 3  < > 13 1   HEMATOCRIT % 37 7  < > 40 1   PLATELETS Thousands/uL 175  < > 214   NEUTROS PCT %  --   --  68   LYMPHS PCT %  --   --  20   MONOS PCT %  --   --  8   EOS PCT %  --   --  2   < > = values in this interval not displayed  Results from last 7 days  Lab Units 01/24/18  0451   SODIUM mmol/L 143   POTASSIUM mmol/L 3 4*   CHLORIDE mmol/L 105   CO2 mmol/L 28   BUN mg/dL 13   CREATININE mg/dL 0 76   CALCIUM mg/dL 8 6   TOTAL PROTEIN g/dL 6 4   BILIRUBIN TOTAL mg/dL 0 70   ALK PHOS U/L 81   ALT U/L 100*   AST U/L 22   GLUCOSE RANDOM mg/dL 72       Results from last 7 days  Lab Units 01/24/18  0451   INR  1 09       * I Have Reviewed All Lab Data Listed Above  * Additional Pertinent Lab Tests Reviewed: All Labs Within Last 24 Hours Reviewed    DeleteRecent Cultures (last 7 days):           Last 24 Hours Medication List:     apixaban 5 mg Oral BID   chlorhexidine 15 mL Swish & Spit Q12H [address]   digoxin 125 mcg Oral Daily   furosemide 40 mg Oral Daily   insulin glargine 42 Units Subcutaneous HS   insulin lispro 1-5 Units Subcutaneous HS   insulin lispro 2-12 Units Subcutaneous TID AC   lisinopril 5 mg Oral Daily   melatonin 6 mg Oral HS   metoprolol tartrate 50 mg Oral Q12H KENA   pantoprazole 40 mg Oral BID AC   senna 1 tablet Oral HS        Today, Patient Was Seen By: Carrol Perrin MD    ** Please Note: Dragon 360 Dictation voice to text software may have been used in the creation of this document   **

## 2018-01-25 NOTE — PROGRESS NOTES
GI Progress Note - Alli Pretty 60 y o  male MRN: [id number]    Unit/Bed#: -Chery Encounter: [id number]    Subjective: Mr Stan Mireles had BRBPR after a bowel movement today and restart of Eliquis last night  He denies any abdominal pain, nausea, vomiting, hematemesis  He is tolerating PO  Objective:     Vitals: Blood pressure 100/78, pulse (!) 107, temperature 98 1 °F (36 7 °C), temperature source Oral, resp  rate 18, height 5' 11 5" (1 816 m), weight (!) 144 kg (316 lb 9 3 oz), SpO2 93 %  ,Body mass index is 43 54 kg/m²  Intake/Output Summary (Last 24 hours) at 01/25/18 1530  Last data filed at 01/25/18 1522   Gross per 24 hour   Intake             1080 ml   Output              750 ml   Net              330 ml       Physical Exam:     General Appearance: Alert, oriented x3, no acute distress  Lungs: Clear to auscultation bilaterally, no respiratory distress  Heart: RRR, no murmur  Abdomen: Non-distended, soft, BS active, NTTP  Extremities: No cyanosis or edema    Invasive Devices     Peripheral Intravenous Line            Peripheral IV 01/22/18 Left Hand 3 days                Lab Results:    Results from last 7 days  Lab Units 01/25/18  0510  01/22/18  1848   WBC Thousand/uL 6 32  < > 7 08   HEMOGLOBIN g/dL 12 3  < > 13 1   HEMATOCRIT % 37 7  < > 40 1   PLATELETS Thousands/uL 175  < > 214   NEUTROS PCT %  --   --  68   LYMPHS PCT %  --   --  20   MONOS PCT %  --   --  8   EOS PCT %  --   --  2   < > = values in this interval not displayed  Results from last 7 days  Lab Units 01/24/18  0451   SODIUM mmol/L 143   POTASSIUM mmol/L 3 4*   CHLORIDE mmol/L 105   CO2 mmol/L 28   BUN mg/dL 13   CREATININE mg/dL 0 76   CALCIUM mg/dL 8 6   TOTAL PROTEIN g/dL 6 4   BILIRUBIN TOTAL mg/dL 0 70   ALK PHOS U/L 81   ALT U/L 100*   AST U/L 22   GLUCOSE RANDOM mg/dL 72       Results from last 7 days  Lab Units 01/24/18  0451   INR  1 09           Imaging Studies: I have personally reviewed pertinent imaging studies        [facility] Chest Pa & Lateral  Result Date: 1/14/2018  Impression: Cardiomegaly with mild vascular congestion and small left effusion  Assessment and Plan:     Rectal Bleeding  - S/P colonoscopy yesterday with 1 polyp in the descending colon removed and clip placement to prevent bleeding, moderately severe diverticulosis in the descending and sigmoid colon, small internal hemorrhoids, no active bleeding found  - Pt is likely having hemorrhoidal bleeding  - Start anusol suppository BID  - High fiber diet  - Continue Eliquis for now as his Hb has remained stable, continue to monitor H/H  - If bleeding does not subside, pt may need colorectal surgical eval for treatment of hemorrhoids as he will need lifelong anticoagulation  - Diet as tolerated      The patient will be seen by Dr Marya Moore  GI will sign off   Call with questions

## 2018-01-26 VITALS
DIASTOLIC BLOOD PRESSURE: 81 MMHG | SYSTOLIC BLOOD PRESSURE: 117 MMHG | OXYGEN SATURATION: 97 % | HEIGHT: 72 IN | HEART RATE: 105 BPM | BODY MASS INDEX: 41.39 KG/M2 | TEMPERATURE: 97.5 F | WEIGHT: 305.56 LBS | RESPIRATION RATE: 20 BRPM

## 2018-01-26 LAB
ANION GAP SERPL CALCULATED.3IONS-SCNC: 7 MMOL/L (ref 4–13)
BUN SERPL-MCNC: 13 MG/DL (ref 5–25)
CALCIUM SERPL-MCNC: 8.8 MG/DL (ref 8.3–10.1)
CHLORIDE SERPL-SCNC: 106 MMOL/L (ref 100–108)
CO2 SERPL-SCNC: 31 MMOL/L (ref 21–32)
CREAT SERPL-MCNC: 0.84 MG/DL (ref 0.6–1.3)
GFR SERPL CREATININE-BSD FRML MDRD: 93 ML/MIN/1.73SQ M
GLUCOSE SERPL-MCNC: 281 MG/DL (ref 65–140)
GLUCOSE SERPL-MCNC: 84 MG/DL (ref 65–140)
GLUCOSE SERPL-MCNC: 97 MG/DL (ref 65–140)
HCT VFR BLD AUTO: 39.2 % (ref 36.5–49.3)
HGB BLD-MCNC: 12.6 G/DL (ref 12–17)
POTASSIUM SERPL-SCNC: 3.5 MMOL/L (ref 3.5–5.3)
SODIUM SERPL-SCNC: 144 MMOL/L (ref 136–145)

## 2018-01-26 PROCEDURE — 99232 SBSQ HOSP IP/OBS MODERATE 35: CPT | Performed by: PHYSICIAN ASSISTANT

## 2018-01-26 PROCEDURE — 82948 REAGENT STRIP/BLOOD GLUCOSE: CPT

## 2018-01-26 PROCEDURE — 85018 HEMOGLOBIN: CPT | Performed by: INTERNAL MEDICINE

## 2018-01-26 PROCEDURE — 99239 HOSP IP/OBS DSCHRG MGMT >30: CPT | Performed by: INTERNAL MEDICINE

## 2018-01-26 PROCEDURE — 99254 IP/OBS CNSLTJ NEW/EST MOD 60: CPT | Performed by: PHYSICIAN ASSISTANT

## 2018-01-26 PROCEDURE — 80048 BASIC METABOLIC PNL TOTAL CA: CPT | Performed by: INTERNAL MEDICINE

## 2018-01-26 PROCEDURE — 97530 THERAPEUTIC ACTIVITIES: CPT

## 2018-01-26 PROCEDURE — 85014 HEMATOCRIT: CPT | Performed by: INTERNAL MEDICINE

## 2018-01-26 RX ORDER — DIGOXIN 125 MCG
125 TABLET ORAL DAILY
Qty: 30 TABLET | Refills: 0 | Status: SHIPPED | OUTPATIENT
Start: 2018-01-27

## 2018-01-26 RX ORDER — FUROSEMIDE 40 MG/1
40 TABLET ORAL DAILY
Qty: 30 TABLET | Refills: 0 | Status: SHIPPED | OUTPATIENT
Start: 2018-01-27 | End: 2018-01-26

## 2018-01-26 RX ORDER — INSULIN GLARGINE 100 [IU]/ML
50 INJECTION, SOLUTION SUBCUTANEOUS EVERY MORNING
Qty: 1 ML | Refills: 0 | Status: SHIPPED | OUTPATIENT
Start: 2018-01-26

## 2018-01-26 RX ORDER — DIGOXIN 125 MCG
125 TABLET ORAL DAILY
Qty: 30 TABLET | Refills: 0 | Status: SHIPPED | OUTPATIENT
Start: 2018-01-27 | End: 2018-01-26

## 2018-01-26 RX ORDER — HYDROCORTISONE ACETATE 25 MG/1
25 SUPPOSITORY RECTAL 2 TIMES DAILY
Qty: 12 SUPPOSITORY | Refills: 0 | Status: SHIPPED | OUTPATIENT
Start: 2018-01-26 | End: 2018-01-26

## 2018-01-26 RX ORDER — LISINOPRIL 5 MG/1
5 TABLET ORAL DAILY
Qty: 30 TABLET | Refills: 0 | Status: SHIPPED | OUTPATIENT
Start: 2018-01-27 | End: 2018-01-26

## 2018-01-26 RX ORDER — PANTOPRAZOLE SODIUM 40 MG/1
40 TABLET, DELAYED RELEASE ORAL DAILY
Qty: 30 TABLET | Refills: 0 | Status: SHIPPED | OUTPATIENT
Start: 2018-01-26

## 2018-01-26 RX ORDER — PANTOPRAZOLE SODIUM 40 MG/1
40 TABLET, DELAYED RELEASE ORAL DAILY
Qty: 30 TABLET | Refills: 0 | Status: SHIPPED | OUTPATIENT
Start: 2018-01-26 | End: 2018-01-26

## 2018-01-26 RX ORDER — FUROSEMIDE 40 MG/1
40 TABLET ORAL DAILY
Qty: 30 TABLET | Refills: 0 | Status: SHIPPED | OUTPATIENT
Start: 2018-01-27

## 2018-01-26 RX ORDER — SENNOSIDES 8.6 MG
1 TABLET ORAL
Qty: 12 EACH | Refills: 0 | Status: SHIPPED | OUTPATIENT
Start: 2018-01-26 | End: 2018-01-26

## 2018-01-26 RX ORDER — SENNOSIDES 8.6 MG
1 TABLET ORAL
Qty: 12 EACH | Refills: 0 | Status: SHIPPED | OUTPATIENT
Start: 2018-01-26

## 2018-01-26 RX ORDER — LISINOPRIL 5 MG/1
5 TABLET ORAL DAILY
Qty: 30 TABLET | Refills: 0 | Status: SHIPPED | OUTPATIENT
Start: 2018-01-27

## 2018-01-26 RX ORDER — HYDROCORTISONE ACETATE 25 MG/1
25 SUPPOSITORY RECTAL 2 TIMES DAILY
Qty: 12 SUPPOSITORY | Refills: 0 | Status: SHIPPED | OUTPATIENT
Start: 2018-01-26

## 2018-01-26 RX ADMIN — FUROSEMIDE 40 MG: 40 TABLET ORAL at 08:38

## 2018-01-26 RX ADMIN — PANTOPRAZOLE SODIUM 40 MG: 40 TABLET, DELAYED RELEASE ORAL at 06:09

## 2018-01-26 RX ADMIN — LISINOPRIL 5 MG: 5 TABLET ORAL at 08:38

## 2018-01-26 RX ADMIN — METOPROLOL TARTRATE 25 MG: 25 TABLET, FILM COATED ORAL at 08:38

## 2018-01-26 RX ADMIN — APIXABAN 5 MG: 5 TABLET, FILM COATED ORAL at 08:38

## 2018-01-26 RX ADMIN — CHLORHEXIDINE GLUCONATE 15 ML: 1.2 RINSE ORAL at 08:38

## 2018-01-26 RX ADMIN — INSULIN LISPRO 6 UNITS: 100 INJECTION, SOLUTION INTRAVENOUS; SUBCUTANEOUS at 13:27

## 2018-01-26 RX ADMIN — DIGOXIN 125 MCG: 0.12 TABLET ORAL at 08:38

## 2018-01-26 RX ADMIN — HYDROCORTISONE ACETATE 25 MG: 25 SUPPOSITORY RECTAL at 08:39

## 2018-01-26 NOTE — PROGRESS NOTES
General Cardiology   Progress Note   Karlene Brownr 60 y o  male MRN: [id number]  Unit/Bed#: -01 Encounter: [id number]        Subjective:   Patient had a bowel movement earlier in which there was no blood  Hemoglobin stable  Patient did have hypotensive episode yesterday but blood pressure has since then recovered  Objective:   Vitals:  Vitals:    01/26/18 1115   BP: 117/81   Pulse: 105   Resp: 20   Temp: 97 5 °F (36 4 °C)   SpO2: 97%       Body mass index is 42 02 kg/m²  Systolic (77JVS), WUW:399 , Min:100 , IZH:057     Diastolic (78MEL), UEW:21, Min:68, Max:88      Intake/Output Summary (Last 24 hours) at 01/26/18 1142  Last data filed at 01/26/18 0843   Gross per 24 hour   Intake              540 ml   Output             1150 ml   Net             -610 ml     Weight (last 2 days)     Date/Time   Weight    01/26/18 0600  (!)  139 (305 56)    01/25/18 0600  (!)  144 (316 58)    01/24/18 0554  (!)  146 (321 87)              PHYSICAL EXAMS:  General:  Patient is not in acute distress, laying in the bed comfortably, awake, alert responding to commands  Head: Normocephalic, Atraumatic  HEENT: White sclera, pink conjunctiva,  PERRLA,pharynx benign  Neck:  Supple, no neck vein distention, carotids+2/+2 no bruits, thyromegaly, adenopathy  Respiratory: clear to P/A  Cardiovascular:  + irregularly irregular  PMI normal, S1-S2 normal, No  Murmurs, thrills, gallops, rubs   GI:  Abdomen soft nontender   No hepatosplenomegaly, adenopathy, ascites,or rebound tenderness  Extremities: No edema, normal pulses, no calf tenderness, no joint deformities, no venous disease   Integument:  No skin rashes or ulceration  Lymphatic:  No cervical or inguinal lymphadenopathy  Neurologic:  Patient is awake alert, responding to command, well-oriented to time and place and person moving all extremities      LABORATORY RESULTS:      CBC with diff:   Results from last 7 days  Lab Units 01/26/18  0956 01/25/18  1952 01/25/18  0510 01/24/18  0451 01/23/18  0532 01/22/18  1848   WBC Thousand/uL  --   --  6 32 6 32 6 27 7 08   HEMOGLOBIN g/dL 12 6 12 7 12 3 12 6 12 6 13 1   HEMATOCRIT % 39 2 39 5 37 7 38 8 38 6 40 1   MCV fL  --   --  91 90 90 90   PLATELETS Thousands/uL  --   --  175 176 173 214   MCH pg  --   --  29 7 29 4 29 4 29 5   MCHC g/dL  --   --  32 6 32 5 32 6 32 7   RDW %  --   --  14 8 14 8 14 8 14 9   MPV fL  --   --  11 1 10 6 10 7 10 7   NRBC AUTO /100 WBCs  --   --   --   --   --  0       CMP:  Results from last 7 days  Lab Units 01/26/18  0504 01/24/18  0451 01/22/18  0453 01/20/18  0441   SODIUM mmol/L 144 143 140 140   POTASSIUM mmol/L 3 5 3 4* 3 9 3 8   CHLORIDE mmol/L 106 105 104 103   CO2 mmol/L 31 28 27 28   ANION GAP mmol/L 7 10 9 9   BUN mg/dL 13 13 24 26*   CREATININE mg/dL 0 84 0 76 1 01 1 02   GLUCOSE RANDOM mg/dL 97 72 199* 273*   CALCIUM mg/dL 8 8 8 6 8 8 8 6   AST U/L  --  22 24 46*   ALT U/L  --  100* 134* 195*   ALK PHOS U/L  --  81 101 112   TOTAL PROTEIN g/dL  --  6 4 6 9 6 6   BILIRUBIN TOTAL mg/dL  --  0 70 0 40 0 50   EGFR ml/min/1 73sq m 93 97 79 78       BMP:  Results from last 7 days  Lab Units 01/26/18  0504 01/24/18  0451 01/22/18  0453   SODIUM mmol/L 144 143 140   POTASSIUM mmol/L 3 5 3 4* 3 9   CHLORIDE mmol/L 106 105 104   CO2 mmol/L 31 28 27   BUN mg/dL 13 13 24   CREATININE mg/dL 0 84 0 76 1 01   GLUCOSE RANDOM mg/dL 97 72 199*   CALCIUM mg/dL 8 8 8 6 8 8           Results from last 7 days  Lab Units 01/20/18  0441   NT-PRO BNP pg/mL 4,619*        Results from last 7 days  Lab Units 01/20/18  0441   MAGNESIUM mg/dL 1 6               Results from last 7 days  Lab Units 01/24/18  0451   INR  1 09       Lipid Profile:   Lab Results   Component Value Date    CHOL 109 01/20/2018     Lab Results   Component Value Date    HDL 32 (L) 01/20/2018     Lab Results   Component Value Date    LDLCALC 58 01/20/2018     Lab Results   Component Value Date    TRIG 96 01/20/2018       Cardiac testing:   Results for orders placed during the hospital encounter of 18   Echo complete with contrast if indicated    Narrative [address] [place] [address], [facility] [address] [address] [phone]    Transthoracic Echocardiogram  2D, M-mode, and Color Doppler    Study date:  2018    Patient: Fern Hess  MR number: [id number]  Account number: [de-identified]  : 1955  Age: 60 years  Gender: Male  Status: Inpatient  Location: Bedside  Height: 71 in  Weight: 313 lb  BP: 145/ 95 mmHg    Indications: Atrial fibrillation  Diagnoses: I48 0 - Atrial fibrillation    Sonographer:  Eric RCS  Interpreting Physician:  Anup Schultz MD  Referring Physician:  Anup Schultz MD  Group:  [facility]    SUMMARY    LEFT VENTRICLE:  The ventricle was mildly dilated  Systolic function was markedly reduced  Ejection fraction was estimated to be 30 %  There was severe diffuse hypokinesis  MITRAL VALVE:  There was mild regurgitation  TRICUSPID VALVE:  There was mild regurgitation  PULMONIC VALVE:  There was mild regurgitation  HISTORY: PRIOR HISTORY: Rapid atrial fibrillation  Tachycardia  Shortness of breath  Risk factors: hypertension, diabetes, and morbid obesity  PROCEDURE: The procedure was performed at the bedside  This was a routine study  The transthoracic approach was used  The study included complete 2D imaging, M-mode, and color Doppler  The heart rate was 116 bpm, at the start of the study  Images were obtained from the parasternal, apical, and subcostal acoustic windows  Echocardiographic views were limited due to poor acoustic window availability, decreased penetration, and lung interference  This was a technically  difficult study  LEFT VENTRICLE: The ventricle was mildly dilated  Systolic function was markedly reduced  Ejection fraction was estimated to be 30 %  There was severe diffuse hypokinesis   DOPPLER: Left ventricular diastolic function parameters were  abnormal     RIGHT VENTRICLE: The size was normal  Systolic function was normal  Wall thickness was normal     LEFT ATRIUM: Size was normal     RIGHT ATRIUM: Size was normal     MITRAL VALVE: Valve structure was normal  There was normal leaflet separation  DOPPLER: The transmitral velocity was within the normal range  There was no evidence for stenosis  There was mild regurgitation  AORTIC VALVE: The valve was trileaflet  Leaflets exhibited normal thickness and normal cuspal separation  DOPPLER: Transaortic velocity was within the normal range  There was no evidence for stenosis  There was no regurgitation  TRICUSPID VALVE: The valve structure was normal  There was normal leaflet separation  DOPPLER: The transtricuspid velocity was within the normal range  There was no evidence for stenosis  There was mild regurgitation  PULMONIC VALVE: Leaflets exhibited normal thickness, no calcification, and normal cuspal separation  DOPPLER: The transpulmonic velocity was within the normal range  There was mild regurgitation  PERICARDIUM: There was no pericardial effusion  The pericardium was normal in appearance  AORTA: The root exhibited normal size  SYSTEMIC VEINS: IVC: The inferior vena cava was not well visualized  SYSTEM MEASUREMENT TABLES    2D  %FS: 15 %  Ao Diam: 4 cm  EDV(Teich): 188 4 ml  EF(Teich): 31 1 %  ESV(Teich): 129 8 ml  IVSd: 0 7 cm  LA Diam: 4 6 cm  LVIDd: 6 1 cm  LVIDs: 5 2 cm  LVPWd: 1 1 cm  SV(Teich): 58 6 ml    CW  TR Vmax: 2 9 m/s  TR maxP 8 mmHg    [employer] Accredited Echocardiography Laboratory    Prepared and electronically signed by    Hernan Melgar MD  Signed 2018 17:32:18    Addendum Date: 2018 12:57:19 PM  Addendum for re-trigger of HL7 to Epic for Fianl status      Addendum entered by: Megan Running         Meds/Allergies   all current active meds have been reviewed and current meds:   Current Facility-Administered Medications   Medication Dose Route Frequency    acetaminophen (TYLENOL) tablet 650 mg  650 mg Oral Q6H PRN    albuterol inhalation solution 2 5 mg  2 5 mg Nebulization Q4H PRN    apixaban (ELIQUIS) tablet 5 mg  5 mg Oral BID    chlorhexidine (PERIDEX) 0 12 % oral rinse 15 mL  15 mL Swish & Spit Q12H [address]    digoxin (LANOXIN) tablet 125 mcg  125 mcg Oral Daily    furosemide (LASIX) tablet 40 mg  40 mg Oral Daily    hydrocortisone (ANUSOL-HC) rectal suppository 25 mg  25 mg Rectal BID    insulin glargine (LANTUS) subcutaneous injection 42 Units  42 Units Subcutaneous HS    insulin lispro (HumaLOG) 100 units/mL subcutaneous injection 1-5 Units  1-5 Units Subcutaneous HS    insulin lispro (HumaLOG) 100 units/mL subcutaneous injection 2-12 Units  2-12 Units Subcutaneous TID AC    lisinopril (ZESTRIL) tablet 5 mg  5 mg Oral Daily    melatonin tablet 6 mg  6 mg Oral HS    metoprolol tartrate (LOPRESSOR) tablet 25 mg  25 mg Oral Q12H KENA    pantoprazole (PROTONIX) EC tablet 40 mg  40 mg Oral BID AC    senna (SENOKOT) tablet 8 6 mg  1 tablet Oral HS     Prescriptions Prior to Admission   Medication    amLODIPine (NORVASC) 5 mg tablet    insulin glargine (LANTUS) 100 units/mL subcutaneous injection    losartan (COZAAR) 100 MG tablet    metFORMIN (GLUMETZA) 1000 MG (MOD) 24 hr tablet    simvastatin (ZOCOR) 40 mg tablet       Assessment/Plan:  1  Atrial fibrillation:  Heart rate controlled on digoxin and Lopressor  Bleeding stopped  Hemoglobin stable  Continue Eliquis  2   NSTEMI type 2:  Secondary to atrial fibrillation with RVR and acute Congestive heart failure  3   Acute systolic CHF/nonischemic cardiomyopathy:  Due to rapid atrial fibrillation   EF 30% with severe diffuse hypokinesis   Euvolemic  Continue Lasix  On Lopressor and lisinopril   Life vest before discharge      4   Hypertension:  Last recorded /81    Continue present regimen      Patient is stable from cardiac standpoint  Patient will follow up with Dr Debbie Mendoza in 1 week  ** Please Note: Dragon 360 Dictation voice to text software may have been used in the creation of this document   **

## 2018-01-26 NOTE — DISCHARGE SUMMARY
Discharge Summary - [facility] Internal Medicine    Patient Information: Liz Nava 60 y o  male MRN: [id number]  Unit/Bed#: -01 Encounter: [id number]    Discharging Physician / Practitioner: Rene Bernabe MD  PCP: No primary care provider on file  Admission Date: 1/13/2018  Discharge Date: 01/26/18    Disposition:   Home      Discharge Diagnoses:     Principal Problem:    Acute combined systolic and diastolic heart failure (HCC)  Active Problems:    HTN (hypertension)    Diabetes mellitus type 2 in obese (HCC)    Atrial fibrillation with RVR (HCC)    Morbid obesity with BMI of 40 0-44 9, adult (HCC)    BRBPR (bright red blood per rectum)  Resolved Problems:    * No resolved hospital problems  *      Consultations During Hospital Stay:  GI, Cardiology    Hospital Course:     Liz Nava is a 60 y o  male patient who originally presented to the hospital on 1/13/2018 due to atrial fibrillation with RVR acute systolic heart failure, tachyarrhythmia initially was started on Cardizem beta-blocker and then on amiodarone drip after the bolus, cardiology was consulted who evaluated an echocardiogram found to have acute systolic CHF nonischemic cardiomyopathy EF of 30% with severe diffuse hypokinesis  Cardiac catheterization- CORONARY CIRCULATION:  LAD: The vessel was normal sized  Angiography showed minor luminal irregularities  Distal LAD: There was a 80 % stenosis  Circumflex: The vessel was normal sized  Angiography showed minor luminal irregularities  RCA: The vessel was large sized (dominant)  Angiography showed minor luminal irregularities  Mid RCA: There was a 30 % stenosis  Type 2 diabetes, patient is supposedly on 66 units of Lantus, sugars have been up and down, after a thorough review will advise him to take the Lantus in the morning 50 units, will follow with his PCP for any further changes    Rectal bleed, underwent a colonoscopy polyp removal, internal hemorrhoids General surgery saw the patient no indication for any acute surgical intervention, can be safely be continued on Eliquis as per multiple consultants at this time since hemoglobin has been stable  Patient has not desatted at rest work with physical therapy walked around oxygen saturation stayed good  Will be discharged home with a LifeVest as per Cardiology    Discharge Day Visit / Exam:     Subjective:  Pleasant middle-aged male no acute distress  Vitals: Blood Pressure: 117/81 (01/26/18 1115)  Pulse: 105 (01/26/18 1115)  Temperature: 97 5 °F (36 4 °C) (01/26/18 1115)  Temp Source: Oral (01/26/18 1115)  Respirations: 20 (01/26/18 1115)  Height: 5' 11 5" (181 6 cm) (01/13/18 2348)  Weight - Scale: (!) 139 kg (305 lb 8 9 oz) (01/26/18 0600)  SpO2: 97 % (01/26/18 1115)  Exam:   Physical Exam   Constitutional: He is oriented to person, place, and time  No distress  HENT:   Head: Normocephalic  Eyes: Pupils are equal, round, and reactive to light  Neck: No tracheal deviation present  Cardiovascular: Normal rate  No murmur heard  Pulmonary/Chest: No respiratory distress  He has no wheezes  Abdominal: He exhibits no distension  There is no tenderness  Musculoskeletal: He exhibits no edema  Neurological: He is alert and oriented to person, place, and time  Skin: He is not diaphoretic  Discharge instructions/Information to patient and family:   See after visit summary for information provided to patient and family  Provisions for Follow-Up Care:  See after visit summary for information related to follow-up care and any pertinent home health orders  Planned Readmission: no     Discharge Statement:  I spent 32 minutes discharging the patient  This time was spent on the day of discharge  I had direct contact with the patient on the day of discharge   Greater than 50% of the total time was spent examining patient, answering all patient questions, arranging and discussing plan of care with patient as well as directly providing post-discharge instructions  Additional time then spent on discharge activities  Discharge Medications:  See after visit summary for reconciled discharge medications provided to patient and family        ** Please Note: This note has been constructed using a voice recognition system **

## 2018-01-26 NOTE — OCCUPATIONAL THERAPY NOTE
OCCUPATIONAL THERAPY TREATMENT  NOTE         01/26/18 1048   Restrictions/Precautions   Weight Bearing Precautions Per Order No   Other Precautions Multiple lines;Telemetry;O2;Fall Risk   Pain Assessment   Pain Assessment 0-10   Diversional Activities Television   Functional Standing Tolerance   Time (post 2 min vitals: /82 /  O2 -96% rm air / HR 92-95)   Comments (post activity vitals: 135/89  / O2 96% rm air / -114)   Transfers   Sit to Stand 5  Supervision   Additional items Increased time required;Verbal cues   Stand to Sit 5  Supervision   Additional items Increased time required;Verbal cues   Functional Mobility   Functional Mobility 5  Supervision   Shower Transfers   Shower Transfer From [employer] Type To and From   Shower Transfer to Transfer tub bench   Shower Transfer Technique Sit pivot   Cognition   Overall Cognitive Status WFL   Arousal/Participation Alert; Cooperative   Attention Within functional limits   Orientation Level Oriented X4   Memory Within functional limits   Following Commands Follows all commands and directions without difficulty   Assessment   Assessment Pt cooperative and agreeable to skilled OT services with focus on improving functional mobility and energy conservation awareness  Pt seated at EOB bed upon start of session  Pt able to sit> stand c no A , c min cues for hand placement upon stand>sit    Pt demonstrates increased activity tolerance requiring fewer rest breaks during presented tasks, however pt demonstrated  SOB during end of ambulation   Pt  educated in energy conservation techniques and safe txfer technique from various surfaces ( tub bench, chair, recliner and EOB)    Pt performance demonstrated good carryover of learned techniques and strategies to facilitate safety during his  daily routine, however pt continues to demonstrate deficits in the areas of activity tolerance   Pt would continue to benefit from skilled OT POC while in hospital to facilitate return to PLOF      Recommendation   OT Discharge Recommendation Home with family support   OT - OK to Discharge Yes  (when medically cleared)   Modified Chevy Scale   Modified Chevy Scale 3                                                     CHICO Mcgraw/L

## 2018-01-26 NOTE — CONSULTS
H&P Exam - General Surgery   Deborah Cooper 60 y o  male MRN: [id number]  Unit/Bed#: -01 Encounter: [id number]    Assessment/Plan     Assessment:  3  59 yo M  With history of systolic and diastolic heart failure and a  Fib on Eliquis who is presenting with rectal bleeding likely secondary to internal hemorrhoids   -Hb stable throughout course of admission  Currently 12 6  Patient denies any fatigue, weakness, or shortness of breathe  -Rectal bleeding currently resolved  Patient noted no blood in bowel movement earlier today    Plan:  1  Continue stool softener and anusol cream and observation of internal hemorrhoids  Current bleeding has resolved and patient Hb has been stable throughout admission  Consider scheduling hemorrhoidectomy if bleeding continues and patient becomes acutely anemic  2  Follow up in office as outpatient    History of Present Illness     HPI:  Deborah Cooper is a 60 y o  male who presents with acute rectal bleeding  The patient states that approximately two weeks ago, he noted developing dyspnea and a rapid heartbeat, and went to the ED for evaluation  He was found to be in new onset rapid Afib with coinciding acute on chronic heart failure  He was admitted and stabilized  At time, patient was on a heparin drip but was transitioned to Eliquis due to his new onset Afib and high stroke risk  Shortly after initiation of Eliquis, the patient noted having BRBPR  He denied any associated melena, hematochezia, abdominal pain, or nausea/vomiting  The blood was present in the toilet bowel and with wiping, and patient stated at time he had never undergone a colonoscopy  A colonoscopy was performed by GI which showed a semipedunculated polyp in the descending colon that was clipped, diverticulosis, and small internal hemorrhoids    There was no sign of active bleeding or old blood during exam  Since colonoscopy, the patient had noted continual BRBPR with bowel movements and surgery was consulted to evaluation of internal hemorrhoids  Currently the patient states that the rectal bleeding had resolved as of this morning  He states that he did not notice any blood in the toilet nor did he see any with wiping  He denies any current shortness of breath, fatigue abdominal pain, nausea, hematemesis, or melena with symptoms  He is ambulating without assistance and is urinating without difficulty  No other complaints at time    Review of Systems   Constitutional: Negative for activity change, appetite change, chills, diaphoresis, fatigue and fever  HENT: Negative for congestion, dental problem, drooling, tinnitus, trouble swallowing and voice change  Eyes: Negative for photophobia, redness and visual disturbance  Respiratory: Negative for apnea, chest tightness, shortness of breath, wheezing and stridor  Cardiovascular: Negative for chest pain, palpitations and leg swelling  Gastrointestinal: Positive for blood in stool and diarrhea  Negative for abdominal distention, abdominal pain, anal bleeding, constipation, nausea, rectal pain and vomiting  Blood in stool currently resolved   Endocrine: Negative for polydipsia, polyphagia and polyuria  Genitourinary: Negative for difficulty urinating, dysuria, flank pain and frequency  Musculoskeletal: Negative for arthralgias, back pain and joint swelling  Skin: Negative for color change, pallor, rash and wound  Neurological: Negative for dizziness, tremors, weakness and light-headedness  Hematological: Negative for adenopathy  Historical Information   Past Medical History:   Diagnosis Date    A-fib ([facility] )     Diabetes mellitus ([facility] )     Hyperlipidemia     Hypertension      Past Surgical History:   Procedure Laterality Date    CHOLECYSTECTOMY      COLONOSCOPY N/A 1/24/2018    Procedure: COLONOSCOPY;  Surgeon: Ewa Cordova MD;  Location: [facility];   Service: Gastroenterology    CYST REMOVAL      KNEE SURGERY Left     arthroscopy    NECK SURGERY       Social History   History   Alcohol Use    1 8 oz/week    3 Cans of beer per week     History   Drug Use No     History   Smoking Status    Never Smoker   Smokeless Tobacco    Never Used     Family History: History reviewed  No pertinent family history  Meds/Allergies   PTA meds:   Prior to Admission Medications   Prescriptions Last Dose Informant Patient Reported? Taking?    amLODIPine (NORVASC) 5 mg tablet   Yes Yes   Sig: Take 5 mg by mouth daily   insulin glargine (LANTUS) 100 units/mL subcutaneous injection   Yes Yes   Sig: Inject 66 Units under the skin daily at bedtime   losartan (COZAAR) 100 MG tablet   Yes Yes   Sig: Take 100 mg by mouth daily   metFORMIN (GLUMETZA) 1000 MG (MOD) 24 hr tablet   Yes Yes   Sig: Take 1,000 mg by mouth daily with breakfast   simvastatin (ZOCOR) 40 mg tablet   Yes Yes   Sig: Take 40 mg by mouth daily at bedtime      Facility-Administered Medications: None     No Known Allergies    Objective   First Vitals:   Blood Pressure: 121/91 (01/13/18 2347)  Pulse: (!) 162 (01/13/18 2347)  Temperature: 97 9 °F (36 6 °C) (01/13/18 2351)  Temp Source: Oral (01/13/18 2351)  Respirations: (!) 28 (01/13/18 2347)  Height: 5' 11 5" (181 6 cm) (01/13/18 2348)  Weight - Scale: (!) 142 kg (313 lb 7 9 oz) (01/13/18 2348)  SpO2: 95 % (01/13/18 2347)    Current Vitals:   Blood Pressure: 117/81 (01/26/18 1115)  Pulse: 105 (01/26/18 1115)  Temperature: 97 5 °F (36 4 °C) (01/26/18 1115)  Temp Source: Oral (01/26/18 1115)  Respirations: 20 (01/26/18 1115)  Height: 5' 11 5" (181 6 cm) (01/13/18 2348)  Weight - Scale: (!) 139 kg (305 lb 8 9 oz) (01/26/18 0600)  SpO2: 97 % (01/26/18 1115)      Intake/Output Summary (Last 24 hours) at 01/26/18 1202  Last data filed at 01/26/18 0843   Gross per 24 hour   Intake              540 ml   Output             1150 ml   Net             -610 ml       Invasive Devices     Peripheral Intravenous Line            Peripheral IV 01/22/18 Left Hand 4 days                Physical Exam   Constitutional: He is oriented to person, place, and time  He appears well-developed and well-nourished  No distress  HENT:   Head: Normocephalic and atraumatic  Mouth/Throat: No oropharyngeal exudate  Eyes: Conjunctivae are normal  Pupils are equal, round, and reactive to light  Neck: Normal range of motion  Neck supple  No tracheal deviation present  No thyromegaly present  Cardiovascular: Exam reveals no gallop and no friction rub  No murmur heard  Irregularly irregular   Pulmonary/Chest: Effort normal and breath sounds normal  No respiratory distress  He has no wheezes  He has no rales  Abdominal: Soft  Bowel sounds are normal  He exhibits no distension  There is no tenderness  There is no rebound and no guarding  Genitourinary: Rectum normal    Genitourinary Comments: No blood around rectal opening  No prolpasing hemorrhoids with bearing down  Musculoskeletal: He exhibits no edema, tenderness or deformity  Neurological: He is alert and oriented to person, place, and time  No cranial nerve deficit  Coordination normal    Skin: Skin is warm and dry  No rash noted  He is not diaphoretic  No erythema  No pallor  Mucosal membranes pink       Lab Results:   I have personally reviewed pertinent lab results  , CBC:   Lab Results   Component Value Date    HGB 12 6 01/26/2018    HCT 39 2 01/26/2018     Imaging: I have personally reviewed pertinent reports  EKG, Pathology, and Other Studies: I have personally reviewed pertinent reports

## 2018-01-26 NOTE — PLAN OF CARE
CARDIOVASCULAR - ADULT     Maintains optimal cardiac output and hemodynamic stability Progressing     Absence of cardiac dysrhythmias or at baseline rhythm Progressing        DISCHARGE PLANNING     Discharge to home or other facility with appropriate resources Progressing        DISCHARGE PLANNING - CARE MANAGEMENT     Discharge to post-acute care or home with appropriate resources Progressing        GASTROINTESTINAL - ADULT     Maintains or returns to baseline bowel function Progressing        INFECTION - ADULT     Absence or prevention of progression during hospitalization Progressing        Nutrition/Hydration-ADULT     Nutrient/Hydration intake appropriate for improving, restoring or maintaining nutritional needs Progressing        PAIN - ADULT     Verbalizes/displays adequate comfort level or baseline comfort level Progressing        Potential for Falls     Patient will remain free of falls Progressing        Prexisting or High Potential for Compromised Skin Integrity     Skin integrity is maintained or improved Progressing        RESPIRATORY - ADULT     Achieves optimal ventilation and oxygenation Progressing        SAFETY ADULT     Maintain or return to baseline ADL function Progressing     Maintain or return mobility status to optimal level Progressing

## 2018-01-26 NOTE — CASE MANAGEMENT
[facility] in the [facility] by Thomas Rouse for 2017  Network Utilization Review Department  Phone: [phone]; Fax [phone]  ATTENTION: The Network Utilization Review Department is now centralized for our 7 Facilities  Please call with any questions or concerns to [phone] and carefully follow the prompts so that you are directed to the right person  All voicemails are confidential  Fax any determinations, approvals, denials, and requests for initial or continue stay review clinical to [phone]  Due to HIGH CALL volume, it would be easier if you could please send faxed requests to expedite your requests and in part, help us provide discharge notifications faster   /////////////////////////////////////////////////////////////////////////////////////////////////////////////////    Continued Stay Review    Date:   1/26/2018     Vital Signs: /81 (BP Location: Left arm)   Pulse 105   Temp 97 5 °F (36 4 °C) (Oral)   Resp 20   Ht 5' 11 5" (1 816 m)   Wt (!) 139 kg (305 lb 8 9 oz)   SpO2 97%   BMI 42 02 kg/m²   ON ROOM AIR    K 3 4   3 5  HGB  12 3   12 7   12 6  HCT  37 7   39 5   39 2    Medications:   Scheduled Meds:   apixaban 5 mg Oral BID   chlorhexidine 15 mL Swish & Spit Q12H [address]   digoxin 125 mcg Oral Daily   furosemide 40 mg Oral Daily   hydrocortisone 25 mg Rectal BID   insulin glargine 42 Units Subcutaneous HS   insulin lispro 1-5 Units Subcutaneous HS   insulin lispro 2-12 Units Subcutaneous TID AC   lisinopril 5 mg Oral Daily   melatonin 6 mg Oral HS   metoprolol tartrate 25 mg Oral Q12H KENA   pantoprazole 40 mg Oral BID AC   senna 1 tablet Oral HS     1/24/2018  Colonoscopy  A single semi-pedunculated polyp found in the descending colon; removed by snare cautery polypectomy  Clips were applied to control  bleeding  Moderately severe diverticulosis found in the descending colon and sigmoid colon   Internal hemorrhoids in the distal rectum  No recent or old blood noted in the colon  Suspect Rectal bleeding was likely hemorrhoidal  Colonic mucosa appeared unremarkable  1/24   CARDIOLOGY  Assessment/Plan:  1   Atrial fibrillation:  Heart rate controlled on digoxin in Lopressor, on average 80s   As per Gastroenterology, patient has GI bleed was hemorrhoidal and Eliquis was restarted  Patient continues to have significant bleeding  Will await GI input, we will continue Eliquis for now       2   NSTEMI type 2:  Secondary to atrial fibrillation with RVR and acute Congestive heart failure      3   Acute systolic CHF/nonischemic cardiomyopathy:  Due to rapid atrial fibrillation   EF 30%with severe diffuse hypokinesis   On Lopressor and lisinopril   Life vest before discharge      4   Hypertension:  Last recorded /65    Continue present regimen           1/24   INTERNAL MEDICINE  Assessment/Plan:  1  Acute combined systolic and diastolic heart failure, EF 30% oral Lasix, BEATRIZ was canceled because of lack of anesthesia clearance, cardiology arranged for LifeVest   Cardiology has evaluated the patient today as well      2   Status post colonoscopy yesterday rectal bleeding likely hemorrhoidal, continued to have some bleed this morning, GI is aware, will continue Eliquis and monitor hemoglobin hematocrit avoid constipation stool softeners  But will continue to monitor in the hospital for today      3  Check hemoglobin hematocrit at least every 12 hours     4  Dm-2 , low sugars yeterday , high this am , increase lantus to 42 units           1/24   GASTROENTEROLOGY      Rectal bleeding:  Patient states that he had another episode of rectal bleeding followed by bowel movement, I suspect this is likel hemorrhoidal   Colonoscopy yesterday revealed 1 polyp which was removed, severe diverticulosis and small hemorrhoids  His hemoglobin has stayed stable and has not had any further episodes of rectal bleeding making post polypectomy bleeding less likely  Would add Anusol suppository at bedtime in addition to MiraLax 1 tbsp on a daily basis the to minimize constipation  Continue diet as tolerated  Can continue anticoagulation while closely monitoring the hemoglobin    If bleeding persists, consider surgical evaluation for hemorrhoids        Discharge Plan:  tbd

## 2018-01-26 NOTE — PLAN OF CARE
Problem: OCCUPATIONAL THERAPY ADULT  Goal: Performs self-care activities at highest level of function for planned discharge setting  See evaluation for individualized goals  See flowsheet documentation for full assessment, interventions and recommendations  Outcome: Progressing  Limitation: Decreased ADL status, Decreased UE strength, Decreased Safe judgement during ADL, Decreased endurance, Decreased self-care trans, Decreased high-level ADLs  Prognosis: Good  Assessment: Pt cooperative and agreeable to skilled OT services with focus on improving functional mobility and energy conservation awareness  Pt seated at EOB bed upon start of session  Pt able to sit> stand c no A , c min cues for hand placement upon stand>sit    Pt demonstrates increased activity tolerance requiring fewer rest breaks during presented tasks, however pt demonstrated  SOB during end of ambulation   Pt  educated in energy conservation techniques and safe txfer technique from various surfaces ( tub bench, chair, recliner and EOB)    Pt performance demonstrated good carryover of learned techniques and strategies to facilitate safety during his  daily routine, however pt continues to demonstrate deficits in the areas of activity tolerance  Pt would continue to benefit from skilled OT POC while in hospital to facilitate return to PLOF        OT Discharge Recommendation: Home with family support  OT - OK to Discharge: Yes (when medically cleared)

## 2018-01-29 NOTE — CASE MANAGEMENT
Notification of Discharge  This is a Notification of Discharge from our facility [address]  Please be advised that this patient has been discharge from our facility  Below you will find the admission and discharge date and time including the patients disposition  PRESENTATION DATE: 1/13/2018 11:37 PM  IP ADMISSION DATE: 1/14/18 0132  DISCHARGE DATE: 1/26/2018  5:50 PM  DISPOSITION: Home with [address] [place] [address] in the [facility] by Thomas Rouse for 2017  Network Utilization Review Department  Phone: [phone]; Fax [phone]  ATTENTION: The Network Utilization Review Department is now centralized for our 7 Facilities  Make a note that we have a new phone and fax numbers for our Department  Please call with any questions or concerns to [phone] and carefully follow the prompts so that you are directed to the right person  All voicemails are confidential  Fax any determinations, approvals, denials, and requests for initial or continue stay review clinical to [phone]  Due to HIGH CALL volume, it would be easier if you could please send faxed requests to expedite your requests and in part, help us provide discharge notifications faster

## 2021-05-10 ENCOUNTER — TELEPHONE (OUTPATIENT)
Dept: OTHER | Facility: OTHER | Age: 66
End: 2021-05-10

## 2021-05-10 NOTE — TELEPHONE ENCOUNTER
Pt calling to see if he can schedule a Covid vaccine appt  Informed him that I can place his phone number in a callback queue for scheduling and he was agreeable  Same done

## 2021-05-11 ENCOUNTER — IMMUNIZATIONS (OUTPATIENT)
Dept: FAMILY MEDICINE CLINIC | Facility: HOSPITAL | Age: 66
End: 2021-05-11

## 2021-05-11 DIAGNOSIS — Z23 ENCOUNTER FOR IMMUNIZATION: Primary | ICD-10-CM

## 2021-05-11 PROCEDURE — 0001A SARS-COV-2 / COVID-19 MRNA VACCINE (PFIZER-BIONTECH) 30 MCG: CPT

## 2021-05-11 PROCEDURE — 91300 SARS-COV-2 / COVID-19 MRNA VACCINE (PFIZER-BIONTECH) 30 MCG: CPT

## 2021-06-04 ENCOUNTER — IMMUNIZATIONS (OUTPATIENT)
Dept: FAMILY MEDICINE CLINIC | Facility: HOSPITAL | Age: 66
End: 2021-06-04

## 2021-06-04 DIAGNOSIS — Z23 ENCOUNTER FOR IMMUNIZATION: Primary | ICD-10-CM

## 2021-06-04 PROCEDURE — 91300 SARS-COV-2 / COVID-19 MRNA VACCINE (PFIZER-BIONTECH) 30 MCG: CPT

## 2021-06-04 PROCEDURE — 0002A SARS-COV-2 / COVID-19 MRNA VACCINE (PFIZER-BIONTECH) 30 MCG: CPT

## 2021-12-21 ENCOUNTER — IMMUNIZATIONS (OUTPATIENT)
Dept: FAMILY MEDICINE CLINIC | Facility: HOSPITAL | Age: 66
End: 2021-12-21

## 2021-12-21 DIAGNOSIS — Z23 ENCOUNTER FOR IMMUNIZATION: Primary | ICD-10-CM

## 2021-12-21 PROCEDURE — 91300 COVID-19 PFIZER VACC 0.3 ML: CPT

## 2021-12-21 PROCEDURE — 0001A COVID-19 PFIZER VACC 0.3 ML: CPT

## 2024-02-25 ENCOUNTER — OFFICE VISIT (OUTPATIENT)
Dept: URGENT CARE | Facility: CLINIC | Age: 69
End: 2024-02-25
Payer: MEDICARE

## 2024-02-25 ENCOUNTER — APPOINTMENT (OUTPATIENT)
Dept: RADIOLOGY | Facility: CLINIC | Age: 69
End: 2024-02-25
Payer: MEDICARE

## 2024-02-25 VITALS
TEMPERATURE: 96.5 F | HEIGHT: 71 IN | DIASTOLIC BLOOD PRESSURE: 82 MMHG | OXYGEN SATURATION: 95 % | BODY MASS INDEX: 42.28 KG/M2 | HEART RATE: 78 BPM | SYSTOLIC BLOOD PRESSURE: 124 MMHG | WEIGHT: 302 LBS | RESPIRATION RATE: 18 BRPM

## 2024-02-25 DIAGNOSIS — S99.922A FOOT INJURY, LEFT, INITIAL ENCOUNTER: ICD-10-CM

## 2024-02-25 DIAGNOSIS — S93.602A FOOT SPRAIN, LEFT, INITIAL ENCOUNTER: Primary | ICD-10-CM

## 2024-02-25 DIAGNOSIS — S63.611A SPRAIN OF LEFT INDEX FINGER, INITIAL ENCOUNTER: ICD-10-CM

## 2024-02-25 DIAGNOSIS — S69.92XA INJURY OF LEFT INDEX FINGER, INITIAL ENCOUNTER: ICD-10-CM

## 2024-02-25 PROCEDURE — G0463 HOSPITAL OUTPT CLINIC VISIT: HCPCS | Performed by: PHYSICIAN ASSISTANT

## 2024-02-25 PROCEDURE — 73630 X-RAY EXAM OF FOOT: CPT

## 2024-02-25 PROCEDURE — 99204 OFFICE O/P NEW MOD 45 MIN: CPT | Performed by: PHYSICIAN ASSISTANT

## 2024-02-25 PROCEDURE — 73130 X-RAY EXAM OF HAND: CPT

## 2024-02-25 RX ORDER — IBUPROFEN 800 MG/1
TABLET ORAL
COMMUNITY
Start: 2024-02-23

## 2024-02-25 RX ORDER — ATORVASTATIN CALCIUM 40 MG/1
40 TABLET, FILM COATED ORAL DAILY
COMMUNITY

## 2024-02-25 RX ORDER — EMPAGLIFLOZIN 25 MG/1
25 TABLET, FILM COATED ORAL DAILY
COMMUNITY

## 2024-02-25 RX ORDER — METOPROLOL SUCCINATE 25 MG/1
25 TABLET, EXTENDED RELEASE ORAL 2 TIMES DAILY
COMMUNITY
Start: 2023-12-02

## 2024-02-25 RX ORDER — METOPROLOL SUCCINATE 100 MG/1
100 TABLET, EXTENDED RELEASE ORAL DAILY
COMMUNITY
Start: 2023-12-23

## 2024-02-25 RX ORDER — AMOXICILLIN 500 MG/1
CAPSULE ORAL
COMMUNITY
Start: 2024-02-23

## 2024-02-25 RX ORDER — POTASSIUM CHLORIDE 1500 MG/1
1 TABLET, EXTENDED RELEASE ORAL 3 TIMES DAILY
COMMUNITY
Start: 2024-01-16

## 2024-02-25 NOTE — PROGRESS NOTES
[facility]        NAME: Mateus Clarke is a 68 y.o. male  : 1955    MRN: [id number]  DATE: 2024  TIME: 2:14 PM      Assessment and Plan     Foot sprain, left, initial encounter [S93.602A]  1. Foot sprain, left, initial encounter  XR foot 3+ vw left      2. Sprain of left index finger, initial encounter  XR hand 3+ vw left        Note:   X-rays look negative for acute osseous abnormality/fracture. Awaiting final read from radiologist.   Put patient in ortho shoe and finger splint for stability/protection of injured areas  Patient to take tylenol OTC (told him to be careful with ibuprofen/NSAIDs due to being on blood thinners)  Follow up with orthopedics in 10-14 days if not improving.     Patient Instructions     Patient Instructions   Joint Sprain    To help with pain   - Take ibuprofen (be careful with taking this with your blood thinners!!) or acetaminophen as directed on the packaging (it helps to start taking it around-the-clock at first to get ahead of the pain, then to take as needed)  - Ice and elevate the injured area 4 times daily for 20-30 minutes each time for the next 3-4 days, then convert to warm compresses in the same regimen   Other measures to take to help with healing    - Perform range of motion exercises 5-10 reps each at least 4 times per day so the joint does not get stiff    - Limit your physical activity with the affected joint. Doing too much too fast is the easiest way to continue to have pain  Follow up with Orthopedics in 10-14 days if symptoms persist               Follow up with PCP in 3-5 days.  Go to ER if symptoms worsen.    Chief Complaint     Chief Complaint   Patient presents with    Foot Pain     Pt fell and slipped on his L foot and L index finger that occurred on 2024. Hurts to walk, can barely put weight but has been getting better. Pt has been putting ice, little to no relief.          History of Present Illness     Patient presents with  left foot pain and left index finger pain from a fall that occurred 2/21/2024. He has not been taking anything OTC for pain. He has been icing the affected areas. He is on eliquis daily.         Review of Systems     Review of Systems   Constitutional:  Negative for chills, fatigue and fever.   HENT:  Negative for congestion, ear pain, postnasal drip, rhinorrhea, sinus pressure, sinus pain, sneezing and sore throat.    Eyes:  Negative for pain and visual disturbance.   Respiratory:  Negative for cough and shortness of breath.    Cardiovascular:  Negative for chest pain and palpitations.   Gastrointestinal:  Negative for abdominal pain, diarrhea, nausea and vomiting.   Genitourinary:  Negative for dysuria and hematuria.   Musculoskeletal:  Positive for arthralgias, gait problem and joint swelling. Negative for back pain and myalgias.   Skin:  Negative for rash.   Neurological:  Negative for dizziness, seizures, syncope, numbness and headaches.   All other systems reviewed and are negative.        Current Medications       Current Outpatient Medications:     amoxicillin (AMOXIL) 500 mg capsule, TAKE 1 CAPSULE BY MOUTH EVERY 12 HOURS WITH FOOD OR MILK, Disp: , Rfl:     apixaban (ELIQUIS) 5 mg, Take 1 tablet (5 mg total) by mouth 2 (two) times a day, Disp: 60 tablet, Rfl: 0    atorvastatin (LIPITOR) 40 mg tablet, Take 40 mg by mouth daily, Disp: , Rfl:     ibuprofen (MOTRIN) 800 mg tablet, TAKE 1 TABLET BY MOUTH EVERY 12 HOURS AS NEEDED FOR TOOTH PAIN. TAKE WITH FOOD/MILK, Disp: , Rfl:     insulin glargine (LANTUS) 100 units/mL subcutaneous injection, Inject 50 Units under the skin every morning, Disp: 1 mL, Rfl: 0    Jardiance 25 MG TABS, Take 25 mg by mouth daily, Disp: , Rfl:     lisinopril (ZESTRIL) 5 mg tablet, Take 1 tablet (5 mg total) by mouth daily, Disp: 30 tablet, Rfl: 0    metFORMIN (GLUMETZA) 1000 MG (MOD) 24 hr tablet, Take 1,000 mg by mouth daily with breakfast, Disp: , Rfl:     metoprolol succinate  (TOPROL-XL) 100 mg 24 hr tablet, Take 100 mg by mouth daily, Disp: , Rfl:     metoprolol succinate (TOPROL-XL) 25 mg 24 hr tablet, Take 25 mg by mouth 2 (two) times a day, Disp: , Rfl:     Potassium Chloride ER 20 MEQ TBCR, Take 1 tablet by mouth 3 (three) times a day, Disp: , Rfl:     sacubitril-valsartan (ENTRESTO)  MG TABS, Take 1 tablet by mouth 2 (two) times a day, Disp: , Rfl:     digoxin (LANOXIN) 0.125 mg tablet, Take 1 tablet (125 mcg total) by mouth daily (Patient not taking: Reported on 2/25/2024), Disp: 30 tablet, Rfl: 0    furosemide (LASIX) 40 mg tablet, Take 1 tablet (40 mg total) by mouth daily (Patient not taking: Reported on 2/25/2024), Disp: 30 tablet, Rfl: 0    hydrocortisone (ANUSOL-HC) 25 mg suppository, Insert 1 suppository (25 mg total) into the rectum 2 (two) times a day, Disp: 12 suppository, Rfl: 0    metoprolol tartrate (LOPRESSOR) 25 mg tablet, Take 1 tablet (25 mg total) by mouth every 12 (twelve) hours, Disp: 60 tablet, Rfl: 0    pantoprazole (PROTONIX) 40 mg tablet, Take 1 tablet (40 mg total) by mouth daily (Patient not taking: Reported on 2/25/2024), Disp: 30 tablet, Rfl: 0    senna (SENOKOT) 8.6 mg, Take 1 tablet (8.6 mg total) by mouth daily at bedtime as needed for constipation, Disp: 12 each, Rfl: 0    simvastatin (ZOCOR) 40 mg tablet, Take 40 mg by mouth daily at bedtime, Disp: , Rfl:     Current Allergies     Allergies as of 02/25/2024    (No Known Allergies)              The following portions of the patient's history were reviewed and updated as appropriate: allergies, current medications, past family history, past medical history, past social history, past surgical history, and problem list.     Past Medical History:   Diagnosis Date    A-fib (HCC)     Diabetes mellitus (HCC)     Hyperlipidemia     Hypertension        Past Surgical History:   Procedure Laterality Date    CHOLECYSTECTOMY      COLONOSCOPY N/A 1/24/2018    Procedure: COLONOSCOPY;  Surgeon: Caitlin FAJARDO  "MD Ochoa;  Location: [facility];  Service: Gastroenterology    CYST REMOVAL      KNEE SURGERY Left     arthroscopy    NECK SURGERY         History reviewed. No pertinent family history.      Medications have been verified.        Objective     /82   Pulse 78   Temp (!) 96.5 °F (35.8 °C)   Resp 18   Ht 5' 11\" (1.803 m)   Wt (!) 137 kg (302 lb)   SpO2 95%   BMI 42.12 kg/m²   No LMP for male patient.         Physical Exam     Physical Exam  Vitals and nursing note reviewed.   Constitutional:       Appearance: Normal appearance. He is obese.   HENT:      Head: Normocephalic and atraumatic.   Cardiovascular:      Rate and Rhythm: Normal rate and regular rhythm.      Heart sounds: Normal heart sounds.   Pulmonary:      Effort: Pulmonary effort is normal.      Breath sounds: Normal breath sounds.   Musculoskeletal:      Comments: Left index finger: ecchymosis and mild swelling along whole finger. Tenderness to palpation of PIP joint. Limited ROM of PIP joint.   Left foot: ecchymosis and swelling over anterior/top of foot with mild tenderness to palpation over the metatarsals. ROM normal, but painful. Pedal pulses present   Skin:     General: Skin is warm and dry.      Findings: Bruising present.   Neurological:      General: No focal deficit present.      Mental Status: He is alert and oriented to person, place, and time.      Gait: Gait abnormal.      Comments: Antalgic gait    Psychiatric:         Mood and Affect: Mood normal.         Behavior: Behavior normal.       "

## 2024-02-25 NOTE — PATIENT INSTRUCTIONS
Joint Sprain    To help with pain   - Take ibuprofen (be careful with taking this with your blood thinners!!) or acetaminophen as directed on the packaging (it helps to start taking it around-the-clock at first to get ahead of the pain, then to take as needed)  - Ice and elevate the injured area 4 times daily for 20-30 minutes each time for the next 3-4 days, then convert to warm compresses in the same regimen   Other measures to take to help with healing    - Perform range of motion exercises 5-10 reps each at least 4 times per day so the joint does not get stiff    - Limit your physical activity with the affected joint. Doing too much too fast is the easiest way to continue to have pain  Follow up with Orthopedics in 10-14 days if symptoms persist

## (undated) DEVICE — RESOLUTION CLIP 2.8MM X 235CM STR LF DISP